# Patient Record
Sex: MALE | Race: WHITE | NOT HISPANIC OR LATINO | Employment: UNEMPLOYED | URBAN - METROPOLITAN AREA
[De-identification: names, ages, dates, MRNs, and addresses within clinical notes are randomized per-mention and may not be internally consistent; named-entity substitution may affect disease eponyms.]

---

## 2023-04-22 ENCOUNTER — HOSPITAL ENCOUNTER (EMERGENCY)
Facility: HOSPITAL | Age: 33
Discharge: PSYCHIATRIC OR SUBSTANCE USE FACILITY | End: 2023-04-23
Attending: EMERGENCY MEDICINE
Payer: COMMERCIAL

## 2023-04-22 DIAGNOSIS — R45.850 HOMICIDAL IDEATION: Primary | ICD-10-CM

## 2023-04-22 DIAGNOSIS — R44.0 AUDITORY HALLUCINATION: ICD-10-CM

## 2023-04-22 PROBLEM — F29 PSYCHOSIS: Chronic | Status: ACTIVE | Noted: 2023-04-22

## 2023-04-22 LAB
ALBUMIN SERPL BCP-MCNC: 4 G/DL (ref 3.5–5.2)
ALP SERPL-CCNC: 102 U/L (ref 55–135)
ALT SERPL W/O P-5'-P-CCNC: 19 U/L (ref 10–44)
AMPHET+METHAMPHET UR QL: NEGATIVE
ANION GAP SERPL CALC-SCNC: 12 MMOL/L (ref 8–16)
APAP SERPL-MCNC: <3 UG/ML (ref 10–20)
AST SERPL-CCNC: 15 U/L (ref 10–40)
BARBITURATES UR QL SCN>200 NG/ML: NEGATIVE
BASOPHILS # BLD AUTO: 0.06 K/UL (ref 0–0.2)
BASOPHILS NFR BLD: 0.6 % (ref 0–1.9)
BENZODIAZ UR QL SCN>200 NG/ML: NEGATIVE
BILIRUB SERPL-MCNC: 0.2 MG/DL (ref 0.1–1)
BILIRUB UR QL STRIP: NEGATIVE
BUN SERPL-MCNC: 12 MG/DL (ref 6–20)
BZE UR QL SCN: NEGATIVE
CALCIUM SERPL-MCNC: 9 MG/DL (ref 8.7–10.5)
CANNABINOIDS UR QL SCN: ABNORMAL
CHLORIDE SERPL-SCNC: 106 MMOL/L (ref 95–110)
CLARITY UR: CLEAR
CO2 SERPL-SCNC: 25 MMOL/L (ref 23–29)
COLOR UR: YELLOW
CREAT SERPL-MCNC: 0.9 MG/DL (ref 0.5–1.4)
CREAT UR-MCNC: 35.5 MG/DL (ref 23–375)
DIFFERENTIAL METHOD: ABNORMAL
EOSINOPHIL # BLD AUTO: 0.1 K/UL (ref 0–0.5)
EOSINOPHIL NFR BLD: 0.9 % (ref 0–8)
ERYTHROCYTE [DISTWIDTH] IN BLOOD BY AUTOMATED COUNT: 12.6 % (ref 11.5–14.5)
EST. GFR  (NO RACE VARIABLE): >60 ML/MIN/1.73 M^2
ETHANOL SERPL-MCNC: <10 MG/DL
GLUCOSE SERPL-MCNC: 105 MG/DL (ref 70–110)
GLUCOSE UR QL STRIP: NEGATIVE
HCT VFR BLD AUTO: 42.4 % (ref 40–54)
HGB BLD-MCNC: 13.8 G/DL (ref 14–18)
HGB UR QL STRIP: NEGATIVE
IMM GRANULOCYTES # BLD AUTO: 0.06 K/UL (ref 0–0.04)
IMM GRANULOCYTES NFR BLD AUTO: 0.6 % (ref 0–0.5)
KETONES UR QL STRIP: NEGATIVE
LEUKOCYTE ESTERASE UR QL STRIP: NEGATIVE
LYMPHOCYTES # BLD AUTO: 2.6 K/UL (ref 1–4.8)
LYMPHOCYTES NFR BLD: 24 % (ref 18–48)
MCH RBC QN AUTO: 29.9 PG (ref 27–31)
MCHC RBC AUTO-ENTMCNC: 32.5 G/DL (ref 32–36)
MCV RBC AUTO: 92 FL (ref 82–98)
METHADONE UR QL SCN>300 NG/ML: NEGATIVE
MONOCYTES # BLD AUTO: 1 K/UL (ref 0.3–1)
MONOCYTES NFR BLD: 8.8 % (ref 4–15)
NEUTROPHILS # BLD AUTO: 7.1 K/UL (ref 1.8–7.7)
NEUTROPHILS NFR BLD: 65.1 % (ref 38–73)
NITRITE UR QL STRIP: NEGATIVE
NRBC BLD-RTO: 0 /100 WBC
OPIATES UR QL SCN: NEGATIVE
PCP UR QL SCN>25 NG/ML: NEGATIVE
PH UR STRIP: 7 [PH] (ref 5–8)
PLATELET # BLD AUTO: 344 K/UL (ref 150–450)
PMV BLD AUTO: 8.4 FL (ref 9.2–12.9)
POTASSIUM SERPL-SCNC: 3.8 MMOL/L (ref 3.5–5.1)
PROT SERPL-MCNC: 6.6 G/DL (ref 6–8.4)
PROT UR QL STRIP: NEGATIVE
RBC # BLD AUTO: 4.62 M/UL (ref 4.6–6.2)
SODIUM SERPL-SCNC: 143 MMOL/L (ref 136–145)
SP GR UR STRIP: 1.01 (ref 1–1.03)
TOXICOLOGY INFORMATION: ABNORMAL
TSH SERPL DL<=0.005 MIU/L-ACNC: 1.17 UIU/ML (ref 0.4–4)
URN SPEC COLLECT METH UR: NORMAL
UROBILINOGEN UR STRIP-ACNC: NEGATIVE EU/DL
WBC # BLD AUTO: 10.81 K/UL (ref 3.9–12.7)

## 2023-04-22 PROCEDURE — 81003 URINALYSIS AUTO W/O SCOPE: CPT | Mod: 59 | Performed by: EMERGENCY MEDICINE

## 2023-04-22 PROCEDURE — 36415 COLL VENOUS BLD VENIPUNCTURE: CPT | Performed by: EMERGENCY MEDICINE

## 2023-04-22 PROCEDURE — 99205 PR OFFICE/OUTPT VISIT, NEW, LEVL V, 60-74 MIN: ICD-10-PCS | Mod: 95,,, | Performed by: PSYCHIATRY & NEUROLOGY

## 2023-04-22 PROCEDURE — 80053 COMPREHEN METABOLIC PANEL: CPT | Performed by: EMERGENCY MEDICINE

## 2023-04-22 PROCEDURE — 80143 DRUG ASSAY ACETAMINOPHEN: CPT | Performed by: EMERGENCY MEDICINE

## 2023-04-22 PROCEDURE — 85025 COMPLETE CBC W/AUTO DIFF WBC: CPT | Performed by: EMERGENCY MEDICINE

## 2023-04-22 PROCEDURE — 84443 ASSAY THYROID STIM HORMONE: CPT | Performed by: EMERGENCY MEDICINE

## 2023-04-22 PROCEDURE — 99205 OFFICE O/P NEW HI 60 MIN: CPT | Mod: 95,,, | Performed by: PSYCHIATRY & NEUROLOGY

## 2023-04-22 PROCEDURE — 80307 DRUG TEST PRSMV CHEM ANLYZR: CPT | Performed by: EMERGENCY MEDICINE

## 2023-04-22 PROCEDURE — 82077 ASSAY SPEC XCP UR&BREATH IA: CPT | Performed by: EMERGENCY MEDICINE

## 2023-04-22 PROCEDURE — 99285 EMERGENCY DEPT VISIT HI MDM: CPT

## 2023-04-23 VITALS
RESPIRATION RATE: 18 BRPM | HEART RATE: 95 BPM | OXYGEN SATURATION: 100 % | DIASTOLIC BLOOD PRESSURE: 91 MMHG | SYSTOLIC BLOOD PRESSURE: 146 MMHG | HEIGHT: 68 IN | TEMPERATURE: 98 F | BODY MASS INDEX: 24.25 KG/M2 | WEIGHT: 160 LBS

## 2023-04-23 NOTE — ED PROVIDER NOTES
"Encounter Date: 4/22/2023    SCRIBE #1 NOTE: I, Vinay Diaz, am scribing for, and in the presence of,  Stanton Oropeza MD.     History     Chief Complaint   Patient presents with    Homicidal     Pt brought to ed via PD for [facility] gabriel, PD advised pt was on video stating that the devil followed him home from alf, also on video making threats to shot someone.        Time seen by provider: 9:59 PM on 04/22/2023    Brayden Francois is a 33 y.o. male who presents to the ED via police transport with homicidal ideation and hallucinations. The patient reports being "plundered" by the devil since his last year in alf. The patient has been out of alf for a month after having served a 7 year sentence after he refused to snitch. He states that he has been feeling negative energy and spirits. The patient notes that he still feels the source of the negative energy coming from the alf he served time in despite being released. He hears auditory hallucinations telling him to kill his mother. The patient states that he has been fighting with the hallucinations and denies any urge to kill his mother. He has tried praying to remove the negative energy. The patient also endorses a sensation of weakness as if something was stealing his strength and making him too weak to fight back the negative spirits. When asked, the patient denies having used a ouija board recently or having done any drugs recently. He mentions that he has done more drugs prior to serving alf time and never had any issues. The patient reports that he and his mother called a psychiatrist yesterday and was only able to get an appointment on 5/15/23 as they were booked. The patient also suspects having cancer as he has a knot in his left chest since 2019 which has since decreased in size. The patient denies SI, HI, or any other symptoms at this time. No pertinent PMHx. No pertinent PSHx.    The history is provided by the patient.   Review of patient's allergies " indicates:  No Known Allergies  No past medical history on file.  Past Surgical History:   Procedure Laterality Date    APPENDECTOMY      FRACTURE SURGERY      TONSILLECTOMY       No family history on file.  Social History     Tobacco Use    Smoking status: Every Day     Packs/day: 1.00     Years: 12.00     Pack years: 12.00     Types: Cigarettes    Smokeless tobacco: Former   Substance Use Topics    Alcohol use: No    Drug use: No     Review of Systems   Constitutional:  Negative for fever.   HENT:  Negative for sore throat.    Respiratory:  Negative for shortness of breath.    Cardiovascular:  Negative for chest pain.   Gastrointestinal:  Negative for nausea.   Genitourinary:  Negative for dysuria.   Musculoskeletal:  Negative for back pain.   Skin:  Negative for rash.   Neurological:  Positive for weakness.   Hematological:  Does not bruise/bleed easily.   Psychiatric/Behavioral:  Positive for hallucinations. Negative for suicidal ideas.      Physical Exam     Initial Vitals [04/22/23 2133]   BP Pulse Resp Temp SpO2   132/81 108 17 97.4 °F (36.3 °C) 98 %      MAP       --         Physical Exam    Nursing note and vitals reviewed.  Constitutional: Vital signs are normal. He appears well-developed and well-nourished.  Non-toxic appearance. No distress.   HENT:   Head: Normocephalic and atraumatic.   Eyes: EOM are normal. Pupils are equal, round, and reactive to light.   Neck: Neck supple. No JVD present.   Normal range of motion.  Cardiovascular:  Normal rate, regular rhythm, normal heart sounds and intact distal pulses.     Exam reveals no gallop and no friction rub.       No murmur heard.  Pulmonary/Chest: Breath sounds normal. He has no wheezes. He has no rhonchi. He has no rales.   Abdominal: Abdomen is soft. Bowel sounds are normal. There is no abdominal tenderness. There is no rebound and no guarding.   Musculoskeletal:         General: Normal range of motion.      Cervical back: Normal range of motion and  neck supple. No rigidity.     Neurological: He is alert and oriented to person, place, and time. He has normal strength and normal reflexes. No cranial nerve deficit or sensory deficit. He exhibits normal muscle tone. Coordination normal. GCS eye subscore is 4. GCS verbal subscore is 5. GCS motor subscore is 6.   Skin: Skin is warm and dry.   Psychiatric: He has a normal mood and affect. His speech is normal and behavior is normal. He is actively hallucinating (auditory). He expresses no homicidal and no suicidal ideation. He expresses no suicidal plans and no homicidal plans.   Patient has auditory hallucinations telling him to kill his mother.       ED Course   Procedures  Labs Reviewed   CBC W/ AUTO DIFFERENTIAL - Abnormal; Notable for the following components:       Result Value    Hemoglobin 13.8 (*)     MPV 8.4 (*)     Immature Granulocytes 0.6 (*)     Immature Grans (Abs) 0.06 (*)     All other components within normal limits   DRUG SCREEN PANEL, URINE EMERGENCY - Abnormal; Notable for the following components:    THC Presumptive Positive (*)     All other components within normal limits    Narrative:     Specimen Source->Urine   ACETAMINOPHEN LEVEL - Abnormal; Notable for the following components:    Acetaminophen (Tylenol), Serum <3.0 (*)     All other components within normal limits   COMPREHENSIVE METABOLIC PANEL   TSH   URINALYSIS, REFLEX TO URINE CULTURE    Narrative:     Specimen Source->Urine   ALCOHOL,MEDICAL (ETHANOL)          Imaging Results    None          Medications - No data to display  Medical Decision Making:   History:   Old Medical Records: I decided to obtain old medical records.  Initial Assessment:   This is an emergent evaluation for psychiatric evaluation.  The pt presents for evaluation of acute psychosis with auditory hallucinations telling him to kill his mother.    I feel the pt is danger to others and pt was placed under PEC with direct observation.  Medical workup was initiated  to evaluate for organic etiologies.  Pt's etoh level was negative  I do not believe the pt has metabolic encephalopathy, CVA, severe electrolyte derrangement, drug induced temporary psychosis.    Psychiatric consult obtained and agrees with pec.  Patient is medically cleared for transfer to psychiatric facility..      Clinical Tests:   Lab Tests: Ordered and Reviewed        Scribe Attestation:   Scribe #1: I performed the above scribed service and the documentation accurately describes the services I performed. I attest to the accuracy of the note.      ED Course as of 04/23/23 0117   Sun Apr 23, 2023   0116 Marijuana (THC) Metabolite(!): Presumptive Positive [AS]   0116 TSH: 1.169 [AS]   0116 Acetaminophen (Tylenol), Serum(!): <3.0 [AS]   0116 WBC: 10.81 [AS]   0116 Hemoglobin(!): 13.8 [AS]   0116 BUN: 12 [AS]   0116 Creatinine: 0.9 [AS]      ED Course User Index  [AS] Stanton Oropeza MD       Medically cleared for psychiatry placement: 4/23/2023 12:33 AM       I, Sandip Vazquez, personally performed the services described in this documentation. All medical record entries made by the scribe were at my direction and in my presence.  I have reviewed the chart and agree that the record reflects my personal performance and is accurate and complete. Stanton Oropeza MD.  Clinical Impression:   Final diagnoses:  [R45.850] Homicidal ideation (Primary)  [R44.0] Auditory hallucination        ED Disposition Condition    Transfer to Psych Facility Stable          ED Prescriptions    None       Follow-up Information    None          Stanton Oropeza MD  04/23/23 0117

## 2023-04-23 NOTE — ED NOTES
Physician's Emergency Certificate for Brayden Francois faxed to and called into [facility] office.  PEC scanned into chart for [facility].

## 2023-04-23 NOTE — ED NOTES
Call placed to [facility] for consult to Telemedicine - Psychiatry for Brayden Francois.

## 2023-04-23 NOTE — DISCHARGE INSTRUCTIONS
Thank you for allowing me to participate as part of your health care team, and thank you for choosing [facility].    CHRIS MILLS MD  Board Certified in Psychiatry & Addiction Medicine      IN CASE OF SUICIDAL THINKING, call the National Suicide Hotline Number: 988    988 Suicide & Crisis Lifeline: 988 , 7-093-099-TALK (8255)  https://iMemories.Atlas Guides           AFTER VISIT INSTRUCTIONS:     [x] Take all medication, from all providers, as prescribed.  [x] If questions or concerns arise, or if experiencing side effects, adverse reactions or worsening symptoms, contact your provider through the [facility] portal at [email or web address], or call [phone] to reach the [facility] main line.  [x] In cases of emergencies, call 381 or 129, or present directly to the emergency department for immediate assistance.    Information on mental health medications:    National [place] of Mental Health:   https://www.nimh.nih.gov/health/topics/mental-health-medications     Web MD:   https://www.ChicPlace.[employer]       RESOURCES:     IN CASE OF SUICIDAL THINKING, call the [facility] Suicide Hotline Number: 988    988 Suicide & Crisis Lifeline: 988 , 5-176-188-TALK (8255)  Provides 24/7, free and confidential support for people in distress, prevention and crisis resources for you or your loved ones, and best practices for professionals.    Call, text or chat.  https://iMemories.org     National Action Calvin for Suicide Prevention: the National Action Calvin for Suicide Prevention (Action Calvin) is the nations public-private partnership for suicide prevention, working with more than 250 national partners.   https://theactionalliance.org     National Strategy for Suicide Prevention & Risk Mitigation:  https://theactionalliance.org/our-strategy/national-strategy-suicide-prevention     [x] Fact Sheet:   https://www.hhs.gov/sites/default/files/national-strategy-for-suicide-prevention-factsheet.pdf     [x] Report:    https://www.ncbi.nlm.nih.gov/books/KEJ207901/pdf/Bookshelf_NBK109917.pdf     Suicide Prevention Resource Center: The Suicide Prevention Resource Center (SPR) is the only federally supported resource center devoted to advancing the implementation of the National Strategy for Suicide Prevention. [facility] is funded by the U.S. Department of Health and Human Services' Substance Abuse and Mental Health Services Administration (SAMA).  https://www.[facility].org     [x] Safety Plan:   https://ByHours.com/wp-content/uploads/2021/08/Yosef-Safety-Plan-8-6-21.pdf     [x] Suicide Risk Curve:  https://ByHours.com/wp-content/uploads/2021/08/Veqmmod-sqtm-zivsl-8-6-21.pdf     [place] Mental Health Advocacy Service: the state agency tasked with protecting the legal rights of people with behavioral health diagnoses.  https://mhas.[place].[facility]     Alcoholics Anonymous (AA): find a meeting near you.  https://www.aa.org     SMI Adviser: resources for individuals and families with serious mental illness.  https://smiadviser.org     National Freedom for the Mentally Ill (HERBER): the nation's largest grassroots organization dedicated to building better lives for individuals with mental illness.  https://www.herber.org/Home     U.S. Department of Health and Human Services (HHS): the mission of HHS is to enhance the health and well-being of all Americans, by providing for effective health and human services and by fostering sound, sustained advances in the sciences underlying medicine, public health, and .   https://www.hhs.gov     Substance Abuse and Mental Health Services Administration (SAMHSA): SAMHSA is the agency within [facility] that leads public health efforts to advance the behavioral health of the nation. SAMHSA's mission is to reduce the impact of substance abuse and mental illness on [place] communities.   https://www.samhsa.gov     National Institutes of Health (NIH): a part of [facility], [facility] is  the largest biomedical research agency in the world.   https://www.nih.gov     National [place] on Drug Abuse (CAITLYN): sponsored by the NIH, the mission of CAITLYN is to advance science on drug use and addiction and to apply that knowledge to improve individual and public health.  https://caitlyn.nih.gov     National [place] on Alcohol Abuse and Alcoholism (NIAAA): sponsored by the NIH, the mission of NIAA is to generate and disseminate fundamental knowledge about the effects of alcohol on health and well-being, and apply that knowledge to improve diagnosis, prevention, and treatment of alcohol-related problems, including alcohol use disorder, across the lifespan.   https://www.niaaa.nih.gov     National Harm Reduction Coalition: resources for harm reduction, including techniques, strategies, policy, and advocacy.  https://harmreduction.org     The SHARE Approach - A Model for Shared Decision Making:  [x] Fact Sheet  https://www.Bannerq.gov/sites/default/files/publications/files/share-approach_factsheet.pdf     AMA Principles of Medical Ethics - Informed Consent & Shared Decision Making:  [x] Chapter  https://www.ama-assn.org/system/files/2019-06/code-of-medical-npwdki-rdbzlbk-7.pdf     Safety Netting for Primary Care:  [x] Article  https://www.ncbi.nlm.nih.gov/pmc/articles/WIG1986097/pdf/yleoyol-9060--e70.pdf       MEDICATION MANAGEMENT:     [x] In addition to the potential beneficial effects, the use of any medication or drug (prescribed, over the counter or otherwise) carries with it the risk of potential adverse effects.  Each has a set of typical adverse effects - some common, some rare - but idiosyncratic and unanticipated reactions unique to you are always possible.      [x] It is important to remember that untreated illness can also pose a risk, which must be taken into account when weighing the pros and cons of a medication trial.    [x] Medications and drugs can sometimes interact with each other in the  body, leading to adverse effects - it is important that all your providers know all the medications and drugs you take - prescribed, over the counter, or otherwise.  Keep all your practitioners up to date with any changes.  It's always a good idea to keep an up-to-date list in an easily accessible location.    [x] There is an inherent unpredictability to all treatment, including the use of medication.  Unexpected outcomes can occur - keep me up to date with any difficulties you encounter.    [x] It is important to take medication as directed, and to comply fully with the instructions.  Check with the appropriate provider first before adjusting or stopping your medication on your own.    If you require further information pertaining to the issues outlined above, please reach out to your providers through the [facility] portal at [email or web address], or call [phone] to discuss.  See resource list for additional material.     Additional information can be provided pertaining to your diagnosis, intended outcomes, target symptoms for treatment, and possible benefits and risks of medication - you can also access this information through the provided resources.  Possible alternatives to the current treatment plan (including no treatment) can also be reviewed.      GENERAL HEALTH & WELLNESS:     [x] Establish and follow regularly with a primary care physician for routine health maintenance and management of any medical comorbidities.  [x] Follow a healthy diet, exercise routinely, and monitor weight and metabolic parameters.  [x] Allow adequate time for sleep and practice good sleep hygiene.  [x] Do not operate a motor vehicle or heavy machinery if the effects of medications or the symptoms underlying your condition impair the ability for you to do so safely.    Dietary Guidelines for Americans, 7662-6855:  U.S. Department of Agriculture  (USDA)  https://www.dietaryguidelines.gov/sites/default/files/2020-12/Dietary_Guidelines_for_Americans_2020-2025.pdf#page=31     The Nutrition Source:  [facility]  https://www.[facility].[facility]/nutritionsource       SLEEP HYGIENE:     Follow these tips to establish healthy sleep habits:  [x] Keep a consistent sleep schedule. Get up at the same time every day, even on weekends or during vacations.  [x] Set a bedtime that is early enough for you to get at least 7-8 hours of sleep.  [x] Don't go to bed unless you are sleepy.  [x] If you don't fall asleep after 20 minutes, get out of bed. Go do a quiet activity without a lot of light exposure. It is especially important to not get on electronics.  [x] Establish a relaxing bedtime routine.  [x] Use your bed only for sleep and sex.  [x] Make your bedroom quiet and relaxing. Keep the room at a comfortable, cool temperature.  [x] Limit exposure to bright light in the evenings.  [x] Turn off electronic devices at least 30 minutes before bedtime.  [x] Don't eat a large meal before bedtime. If you are hungry at night, eat a light, healthy snack.  [x] Exercise regularly and maintain a healthy diet.  [x] Avoid consuming caffeine in the afternoon or evening.  [x] Avoid consuming alcohol before bedtime.  [x] Reduce your fluid intake before bedtime.    QUICK TIPS FOR BETTER SLEEP  Reduce smartphone usage Create and maintain a nightly ritual Avoid caffeine 4-6 hours before sleeping Don't eat or drink too much at bedtime Sleep at the same time every night        American Academy of Sleep Medicine - Healthy Sleep Habits:  https://sleepeducation.org/healthy-sleep/healthy-sleep-habits     American Academy of Sleep Medicine - Bedtime Calculator:  https://sleepeducation.org/healthy-sleep/bedtime-calculator     American Academy of Sleep Medicine - Cognitive Behavioral Therapy for Insomnia (CBT-I):  https://sleepeducation.org/patients/cognitive-behavioral-therapy      American Academy of Sleep Medicine - Insomnia:  https://sleepeducation.org/sleep-disorders/insomnia       ALCOHOL & DRUG USE COUNSELING:     - abstain from alcohol and illicit drug use  - routinely attend 12 step (or equivalent) mutual self-help meetings  - work with a sponsor  - establish sobriety and maintain a recovery lifestyle      Preventing Excessive Alcohol Use (CDC):  https://www.cdc.gov/alcohol/fact-sheets/moderate-drinking.htm#:~:text=To%20reduce%20the%20risk%20of,days%20when%20alcohol%20is%20consumed.     [x] Alcohol consumption is associated with a variety of short- and long-term health risks, including motor vehicle crashes, violence, sexual risk behaviors, high blood pressure, and various cancers (e.g., breast cancer).  [x] The risk of these harms increases with the amount of alcohol you drink. For some conditions, like some cancers, the risk increases even at very low levels of alcohol consumption (less than 1 drink).  [x] To reduce the risk of alcohol-related harms, the 2061-3993 Dietary Guidelines for Americans recommends that adults of legal drinking age can choose not to drink, or to drink in moderation by limiting intake to 2 drinks or less in a day for men or 1 drink or less in a day for women, on days when alcohol is consumed.  [x] The Guidelines also do not recommend that individuals who do not drink alcohol start drinking for any reason and that if adults of legal drinking age choose to drink alcoholic beverages, drinking less is better for health than drinking more.  [x] The Guidelines note that some people should not drink alcohol at all, such as:  - If they are pregnant or might be pregnant.  - If they are younger than age 21.  - If they have certain medical conditions or are taking certain medications that can interact with alcohol.  - If they are recovering from an alcohol use disorder or if they are unable to control the amount they drink.  [x] The Guidelines also note that not  "drinking alcohol is the safest option for women who are lactating. Generally, moderate consumption of alcoholic beverages by a woman who is lactating (up to 1 standard drink in a day) is not known to be harmful to the infant, especially if the woman waits at least 2 hours after a single drink before nursing or expressing breast milk. Women considering consuming alcohol during lactation should talk to their healthcare provider.  [x] The Guidelines note, Emerging evidence suggests that even drinking within the recommended limits may increase the overall risk of death from various causes, such as from several types of cancer and some forms of cardiovascular disease. Alcohol has been found to increase risk for cancer, and for some types of cancer, the risk increases even at low levels of alcohol consumption (less than 1 drink in a day).  [x] Although past studies have indicated that moderate alcohol consumption has protective health benefits (e.g., reducing risk of heart disease), recent studies show this may not be true.  [x] Its important to focus on the amount people drink on the days that they drink. Even if women consume an average of 1 drink per day or men consume an average of 2 drinks per day, binge drinking increases the risk of experiencing alcohol-related harm in the short-term and in the future.    Drinking Levels Defined (NIAAA):  https://www.niaaa.nih.gov/alcohol-health/overview-alcohol-consumption/moderate-binge-drinking     Drinking in Moderation:  According to the "Dietary Guidelines for Americans 3614-5981, U.S. Department of Health and Human Services and U.S. Department of Agriculture, adults of legal drinking age can choose not to drink or to drink in moderation by limiting intake to 2 drinks or less in a day for men and 1 drink or less in a day for women, when alcohol is consumed. Drinking less is better for health than drinking more.    Binge Drinking:  NIAAA defines binge drinking as a pattern " of drinking alcohol that brings blood alcohol concentration (LUDY) to 0.08 percent - or 0.08 grams of alcohol per deciliter - or higher.  For a typical adult, this pattern corresponds to consuming 5 or more drinks (male), or 4 or more drinks (female), in about 2 hours.    The Substance Abuse and Mental Health Services Administration (SAMHSA), which conducts the annual National Survey on Drug Use and Health (NSDUH), defines binge drinking as 5 or more alcoholic drinks for males or 4 or more alcoholic drinks for females on the same occasion (i.e., at the same time or within a couple of hours of each other) on at least 1 day in the past month.    Heavy Alcohol Use:  NIAAA defines heavy drinking as follows:  - For men, consuming more than 4 drinks on any day or more than 14 drinks per week.  - For women, consuming more than 3 drinks on any day or more than 7 drinks per week.     [facility] defines heavy alcohol use as binge drinking on 5 or more days in the past month.    Patterns of Drinking Associated with Alcohol Use Disorder:  Binge drinking and heavy alcohol use can increase an individual's risk of alcohol use disorder.    Certain people should avoid alcohol completely, including those who:  - Plan to drive or operate machinery, or participate in activities that require skill, coordination, and alertness.  - Take certain over-the-counter or prescription medications.  - Have certain medical conditions.  - Are recovering from alcohol use disorder or are unable to control the amount that they drink.  - Are younger than age 21.  - Are pregnant or may become pregnant.    U.S. Standard Drink  12 oz beer   (5% ABV) 8 oz malt liquor   (7% ABV) 5 oz wine   (12% ABV) 1.5 oz 80-proof distilled spirit  (40% ABV)        Heroin use harm reduction:  1. Carry naloxone. When using heroin, make sure you have at least one dose of naloxone - the overdose reversal drug - and have it in plain view. Understand how to give it.  2. Try a small  dose first. It is best to first try a small amount of the heroin to check the effect.  3. Dont use heroin alone. Always use heroin with someone else and take turns while using.    It is possible to overdose with heroin whether you are snorting, injecting or using it in another form.    Signs of an overdose or emergency:   - The person is awake but unable to talk.  - Their body is limp.  - Their breathing is shallow or slow or stopped.  - Their skin is pale, ashen or clammy/sweaty.  - They are unconscious.    In case of emergency, give naloxone. If you suspect the heroin may contain fentanyl, administer more than one dose. Seek medical help even if naloxone has been given. Call 911 for help.      SHARED DECISION MAKING & INFORMED CONSENT:     Shared medical decision making and informed consent are the hallmark and bedrock of excellent clinical care.  During the encounter, shared medical decision making was employed and informed consent was obtained, to the degree possible, whenever feasible, appropriate and relevant. Those interventions are supplemented here with written materials, detailing the topics in more depth.       PSYCHOEDUCATION:     Psychoeducation pertaining to the following -     Diagnosis Etiology Disease Processes Natural Progression   Treatment Options Time Course Safety Netting Informed Consent   Intended Benefits of Medication Expectable Adverse Effects Target Symptoms for Treatment Alternatives to Current Treatment   Shared   Decision Making Risk Mitigation Strategies Harm Reduction Techniques Associated Bio-Med Complications     - can be further discussed and reviewed (you can also access additional information through the provided resources in this document).      Effective communication is essential in order to engage in shared medical decision making.  If you had difficulty understanding anything during your encounter or in this supplementary document, please contact your providers through the  [facility] portal at [email or web address] or call [phone].     [place] Dictionary  https://dictionary.leslie.org/us       It can be easy to miss, forget, or misremember important important information that was discussed during the session - especially when you're stressed, upset, or don't feel well.  If you or a representative have any additional questions, concerns, or topics to discuss - please contact your providers through the [facility] portal at [email or web address] or call [phone].    Memory Loss  https://www.[employer].Endorse.me/brain/memory-loss    Causes of Memory Loss  https://www.DotProduct/what-causes-memory-loss-6497687    Memory loss: When to seek help  https://www.[facility].org/diseases-conditions/alzheimers-disease/in-depth/memory-loss/art-81693303    Memory, Forgetfulness, and Aging: What's Normal and What's Not?  https://www.gnia.nih.gov/health/memory-forgetfulness-and-aging-whats-normal-and-whats-not    Depression and Memory Loss  https://www.[employer].Endorse.me/health/depression/depression-and-memory-loss    The Relationship Between Anxiety and Memory Loss  https://www.Pl[employer]c.[facility]/academics/blog-posts/the-relationship-between-anxiety-and-memory-loss     PRESCRIPTION DRUG MANAGEMENT:     Prescription Drug Management entails the following:  [x] The review, recommendation, or consideration without recommendation of medications during the encounter.  [x] Discussion (to the extent possible) with the patient and/or other interested parties of the diagnosis, target symptoms, intended outcomes, and possible benefits and risks of medication, as well as alternatives (including no treatment), if not otherwise known or stated prior.  [x] Discussion (to the extent possible) with the patient and/or other interested parties of possible expectable adverse effects of any proposed individual psychotropic agents, as well as the inherent unpredictability of treatment, if not otherwise known or stated  prior.  [x] Informed consent is sought from the patient (and/or guardian/designated decision maker, if applicable) after a thorough discussion (to the extent possible) of the aforementioned points outlined above.  [x] The provision of counseling (to the extent possible) to the patient and/or other interested parties on the importance of full compliance with any prescribed medication, if not otherwise known or stated prior.    Information on psychotropic medication can be found at:   National [place] of Mental Health: Information on Mental Health Medications      RISK MITIGATION, HARM REDUCTION & SAFETY NETTING:     Risk Mitigation Strategies, Harm Reduction Techniques, and Safety Netting are important interventions that can reduce acute and chronic risk.  As such, opportunities were sought to incorporate psychoeducation and practical advice pertaining to these topics into the encounter, to the degree possible, whenever feasible, appropriate and relevant.  Those interventions are supplemented here with written materials, detailing the topics in more depth.       RISK MITIGATION STRATEGIES:     Risk mitigation strategies are used to reduce the likelihood of future episodes of suicide, homicide, violence, and/or other problematic behaviors (e.g. self-injurious, risky, addictive, compulsive, impulsive). The following are examples of risk mitigation strategies which you can employ in order to reduce your overall burden of risk.     [x] Treatment of underlying psychopathology driving acute and chronic risk to the extent possible.  [x] Use of self administered rating scales and journaling to assist in risk tracking.  [x] Exploration of protective factors to potentially counterbalance risk.  [x] Identification and avoidance of triggers and situations that increase risk, including excessive alcohol and drug use.  [x] Timely follow up and ongoing treatment of mental health issues moving forward.  [x] Full compliance with  medication regimen.  [x] A good working knowledge of your medication regimen, including specific instructions on the administration of the medications.  [x] Consultation with an appropriate medical provider prior to altering or deviating from these instructions on your own.  [x] Active involvement and participation of family and natural support wherever feasible and possible.  [x] Development and review of coping strategies that can be immediately deployed in times of acute crisis.  [x] Implementation of home safety practices and the removal/reduction of access to lethal means (including, but not limited to, firearms, certain types and quantities of medication, poisons, or other methods you may have contemplated or identified).  [x] Collaborative development of a written safety plan with your treatment team and loved ones that can be immediately referred to in times of acute crisis.  [x] Utilization of a safety contract to engage your treatment team and further assess/manage risk.  [x] A good working knowledge of how to access emergency treatment in times of acute crisis.  [x] Utilization of suicide hotlines number (988) and resources in times of crisis.    If you require further information pertaining to the issues outlined above, please reach out to your providers through the [facility] portal at [email or web address], or call [phone] to discuss.  See resource list for additional material.      SAFETY NETTING:     In healthcare, safety netting refers to the provision of information to help patients or carers identify the need to consult a health care professional if a health concern arises or changes.  The relevance of this advice is most obvious with chronic mental illnesses, as their dynamic nature, with symptoms and signs emerging at different times and in different combinations, makes safety netting particularly important.  Specific safety net advice for you includes the following:    [x] The existence of  uncertainty. Mental health diagnoses and conditions contain at least some degree of uncertainty - knowing this, you should feel empowered to reconsult if necessary.  [x] What exactly to look out for. Given the recognised risk of possible deterioration or the development of complications, you should become familiar with the specific clinical features (including red flags) to look out for.    [x] How exactly to seek further help. You should know how and where to seek further help if needed.  Make a plan in advance and keep it handy.  It's also a good idea to share the plan with your treatment providers and loved ones.  [x] What to expect about time course. Mental health diagnoses and conditions often have an expected time course, which is important information for you to know.  However, if your difficulties do not conform to this time line and concerns arise, do not delay seeking further medical advice.    If you require further information pertaining to the issues outlined above, please reach out to your providers through the [facility] portal at [email or web address], or call [phone] to discuss.  See resource list for additional material.      HARM REDUCTION:     Harm Reduction techniques are used in an effort to reduce negative consequences associated with risky and maladaptive behaviors, until cessation of the problematic behaviors can be established.  Harm reduction is best thought of as a journey and not a destination; it is not an endorsement of problematic behavior, but an acknowledgement and recognition of the step-by-step nature of recovery.      Although commonly employed in working with people who suffer with drug addiction, harm reduction can be more broadly applied to any problematic behavior.    Harm Reduction and Substance Abuse:  [x] Incorporates a spectrum of strategies that includes safer use, managed use, abstinence, meeting people who use drugs where theyre at, and addressing conditions of  use along with the use itself.  [x] Accepts, for better or worse, that licit and illicit drug use is part of our world and chooses to work to minimize its harmful effects rather than simply ignore or condemn them.  [x] Understands drug use as a complex, multi-faceted phenomenon that encompasses a continuum of behaviors from severe use to total abstinence, and acknowledges that some ways of using drugs are clearly safer than others.  [x] Calls for the non-judgmental, non-coercive provision of services and resources to people who use drugs and the communities in which they live in order to assist them in reducing attendant harm.  [x] Affirms people who use drugs themselves as the primary agents of reducing the harms of their drug use and seeks to empower them to share information and support each other in strategies which meet their actual conditions of use.  [x] Does not attempt to minimize or ignore the real and tragic harm and danger that can be associated with illicit drug use.  [x] Meets people where they are, but seeks to not leave them there.  [x] Examples of specific interventions include, but are not limited to, narcan (naloxone), medication assisted treatment, syringe access, overdose prevention, and safer drug use techniques.    Key Harm Reduction Strategies: Opioid Use Disorder  [x] Safe Injection Sites & Equipment  [x] Managed Use  [x] Syringe Exchange Programs  [x] Fentanyl Test Strips  [x] Pharmacotherapy/Medication Assisted Treatment  [x] Narcan  [x] Good Holiness Laws  [x] Treatment Instead of assisted  [x] Diversion Programs  [x] Overdose Education  [x] Abstinence    Whether or not you struggle with substance abuse, any and all opportunities to employ harm reduction techniques to address difficult to change problematic behaviors should be sought and implemented - whenever and wherever feasible, relevant and applicable. Additionally, harm reduction techniques can be applied broadly, and are relevant  "for a multitude of situations - even those that do not involve problematic or maladaptive behaviors.     EXAMPLES OF HARM REDUCTION IN OTHER AREAS  SUN SCREEN SEAT BELTS SPEED LIMITS BIRTH CONTROL        If you require further information pertaining to the issues outlined above, please reach out to your providers through the [facility] portal at [email or web address], or call [phone] to discuss.  See resource list for additional material.      FIREARM SAFETY:     THE SIX BASIC GUN SAFETY RULES  There are six basic gun safety rules for gun owners to understand and practice at all times:  Treat all guns as if they are loaded. Always assume that a gun is loaded even if you think it is unloaded. Every time a gun is handled for any reason, check to see that it is unloaded. If you are unable to check a gun to see if it is unloaded, leave it alone and seek help from someone more knowledgeable about guns.  Keep the gun pointed in the safest possible direction. Always be aware of where a gun is pointing. A "safe direction" is one where an accidental discharge of the gun will not cause injury or damage. Only point a gun at an object you intend to shoot. Never point a gun toward yourself or another person.  Keep your finger off the trigger until you are ready to shoot. Always keep your finger off the trigger and outside the trigger guard until you are ready to shoot. Even though it may be comfortable to rest your finger on the trigger, it also is unsafe. If you are moving around with your finger on the trigger and stumble or fall, you could inadvertently pull the trigger. Sudden loud noises or movements can result in an accidental discharge because there is a natural tendency to tighten the muscles when startled. The trigger is for firing and the handle is for handling.  Know your target, its surroundings and beyond. Check that the areas in front of and behind your target are safe before shooting. Be aware that if the " bullet misses or completely passes through the target, it could strike a person or object. Identify the target and make sure it is what you intend to shoot. If you are in doubt, DON'T SHOOT! Never fire at a target that is only a movement, color, sound or unidentifiable shape. Be aware of all the people around you before you shoot.  Know how to properly operate your gun. It is important to become thoroughly familiar with your gun. You should know its mechanical characteristics including how to properly load, unload and clear a malfunction from your gun. Obviously, not all guns are mechanically the same. Never assume that what applies to one make or model is exactly applicable to another. You should direct questions regarding the operation of your gun to your firearms dealer, or contact the  directly.  Store your gun safely and securely to prevent unauthorized use. Guns and ammunition should be stored separately. When the gun is not in your hands, you must still think of safety. Use an approved firearms safety device on the gun, such as a trigger lock or cable lock, so it cannot be fired. Store it unloaded in a locked container, such as an approved lock box or a gun safe. Store your gun in a different location than the ammunition. For maximum safety you should use both a locking device and a storage container.    ADDITIONAL SAFETY POINTS  The six basic safety rules are the foundational rules for gun safety. However, there are additional safety points that must not be overlooked.  [x] Never handle a gun when you are in an emotional state such as anger or depression. Your judgment may be impaired. If you have acute or chronic suicidal ideation, a suicide plan, or suicidal intent, have firearms removed and your access restricted by a trusted loved one or other responsible individual or agency.  [x] Never shoot a gun in celebration (the Fourth of July or New Year's Maureen, for example). Not only is this unsafe,  "but it is generally illegal. A bullet fired into the air will return to the ground with enough speed to cause injury or death.  [x] Do not shoot at water, flat or hard surfaces. The bullet can ricochet and hit someone or something other than the target.  [x] Hand your gun to someone only after you verify that it is unloaded and the cylinder or action is open. Take a gun from someone only after you verify that it is unloaded and the cylinder or action is open.  [x] Guns, alcohol and drugs don't mix. Alcohol and drugs can negatively affect judgment as well as physical coordination. Alcohol and any other substance likely to impair normal mental or physical functions should not be used before or while handling guns. Avoid handling and using your gun when you are taking medications that cause drowsiness or include a warning to not operate machinery while taking this drug.   [x] The loud noise from a fired gun can cause hearing damage, and the debris and hot gas that is often emitted can result in eye injury. Always wear ear and eye protection when shooting a gun.      GUNS AND CHILDREN - FIREARM OWNER RESPONSIBILITIES    You Cannot Be Too Careful with Children and Guns  [x] There is no such thing as being too careful with children and guns. Never assume that simply because a toddler may lack finger strength, they can't pull the trigger. A child's thumb has twice the strength of the other fingers. When a toddler's thumb "pushes" against a trigger, invariably the barrel of the gun is pointing directly at the child's face. NEVER leave a firearm lying around the house.  [x] Child safety precautions still apply even if you have no children or if your children have grown to adulthood and left home. A nephew, niece, neighbor's child or a grandchild may come to visit. Practice gun safety at all times.  [x] To prevent injury or death caused by improper storage of guns in a home where children are likely to be present, you should " "store all guns unloaded, lock them with a firearms safety device and store them in a locked container. Ammunition should be stored in a location separate from the gun.    Talking to Children About Guns  [x] Children are naturally curious about things they don't know about or think are "forbidden." When a child asks questions or begins to act out "gun play," you may want to address his or her curiosity by answering the questions as honestly and openly as possible. This will remove the mystery and reduce the natural curiosity. Also, it is important to remember to talk to children in a manner they can relate to and understand. This is very important, especially when teaching children about the difference between "real" and "make-believe." Let children know that, even though they may look the same, real guns are very different than toy guns. A real gun will hurt or kill someone who is shot.    Instill a Mind Set of Safety and Responsibility  [x] The American Academy of Pediatrics reports that adolescence is a highly vulnerable stage in life for teenagers struggling to develop traits of identity, independence and autonomy. Children, of course, are both naturally curious and innocently unaware of many dangers around them. Thus, adolescents as well as children may not be sufficiently safeguarded by cautionary words, however frequent. Contrary actions can completely undermine good advice. A "Do as I say and not as I do" approach to gun safety is both irresponsible and dangerous.  [x] Remember that actions speak louder than words. Children learn most by observing the adults around them. By practicing safe conduct you will also be teaching safe conduct.    Safety and Storage Devices  [x] If you decide to keep a firearm in your home you must consider the issue of how to store the firearm in a safe and secure manner. There are a variety of safety and storage devices currently available to the public in a wide range of prices. " Some devices are locking mechanisms designed to keep the firearm from being loaded or fired, but don't prevent the firearm from being handled or stolen. There are also locking storage containers that hold the firearm out of sight. For maximum safety you should use both a firearm safety device and a locking storage container to store your unloaded firearm.   Two of the most common locking mechanisms are trigger locks and cable locks. Trigger locks are typically two-piece devices that fit around the trigger and trigger guard to prevent access to the trigger. One side has a post that fits into a hole in the other side. They are locked by a key or combination locking mechanism. Cable locks typically work by looping a strong steel cable through the action of the firearm to block the firearm's operation and prevent accidental firing. However, neither trigger locks nor cable locks are designed to prevent access to the firearm.   [x] Smaller lock boxes and larger gun safes are two of the most common types of locking storage containers. One advantage of lock boxes and gun safes is that they are designed to completely prevent unintended handling and removal of a firearm. Lock boxes are generally constructed of sturdy, high-grade metal opened by either a key or combination lock. Gun safes are quite heavy, usually weighing at least 50 pounds. While gun safes are typically the most expensive firearm storage devices, they are generally more reliable and secure.     Remember: Safety and storage devices are only as secure as the precautions you take to protect the key or combination to the lock.    RULES FOR KIDS  Adults should be aware that a child could discover a gun when a parent or another adult is not present. This could happen in the child's own home; the home of a neighbor, friend or relative; or in a public place such as a school or park. If this should happen, a child should know the following rules and be taught to  practice them.   Stop  The first rule for a child to follow if he/she finds or sees a gun is to stop what he/she is doing.  Don't Touch!  The second rule is for a child not to touch a gun he/she finds or sees. A child may think the best thing to do if he/she finds a gun is to pick it up and take it to an adult. A child needs to know he/she should NEVER touch a gun he/she may find or see.  Leave the Area  The third rule is to immediately leave the area. This would include never taking a gun away from another child or trying to stop someone from using gun.  Tell an Adult  The last rule is for a child to tell an adult about the gun he/she has seen. This includes times when other kids are playing with or shooting a gun.     METHODS OF CHILDPROOFING YOUR FIREARM  As a responsible handgun owner, you must recognize the need and be aware of the methods of childproofing your handgun, whether or not you have children.  Whenever children could be around, whether your own, or a friend's, relative's or neighbor's, additional safety steps should be taken when storing firearms and ammunition in your home.  [x] Always store your firearm unloaded.  [x] Use a firearms safety device AND store the firearm in a locked container.  [x] Store the ammunition separately in a locked container.  Always storing your firearm securely is the best method of childproofing your firearm; however, your choice of a storage place can add another element of safety. Carefully choose the storage place in your home especially if children may be around.  [x] Do not store your firearm where it is visible.  [x] Do not store your firearm in a bedside table, under your mattress or pillow, or on a closet shelf.  [x] Do not store your firearm among your valuables (such as jewelry or cameras) unless it is locked in a secure container.  [x] Consider storing firearms not possessed for self-defense in a safe and secure manner away from the home.    Everytown for Gun  Safety:  https://www.everytown.org       Gun Violence: Prediction, Prevention and Policy  American Psychological Association Panel of Experts Report  https://www.apa.org/pubs/reports/gun-violence-report.pdf     If you require further information pertaining to any of the issues outlined above, please reach out to your providers through the [facility] portal at [email or web address], or call [phone] to discuss.  See resource list for additional material.      IN CASE OF SUICIDAL THINKING, call the Ortho-tag Suicide TheJobPostline Number: 988    988 Suicide & Crisis Lifeline: 988 , 5-956-673-MCLR (8255)  Provides 24/7, free and confidential support for people in distress, prevention and crisis resources for you or your loved ones, and best practices for professionals.    Call, text or chat.  https://Pearl.com              REFERRAL RECOMMENDATIONS FOR SUBSTANCE ABUSE & MENTAL HEALTH      IN CASE OF SUICIDAL THINKING, call the Ortho-tag Suicide TheJobPostline Number: 988    988 Suicide & Crisis Lifeline: 988 , 9-222-524-OFXZ (8255)  https://Pearl.com       SUBSTANCE ABUSE:     [facility] (formerly known as the ABU)  [x] [phone], Option 2  [x] [address], [place] [address]  [x] [email or web address]  [x] [facility] delivers comprehensive and collaborative treatment for alcohol and substance use disorders.  Excellent program for working professionals or anyone else seeking recovery.  [x] Requires insurance approval prior to starting program, call number above for more information.  [x] Intensive Outpatient Rehabilitation Program - M-F 9am-3pm - daily groups with psychologists and social workers, sessions with MDs 3x per week   [x] Ambulatory detox and dual diagnosis available      SUBOXONE:     NOTE: some Suboxone clinics require their clients to participate in a structured program (such as an IOP) in order to be prescribed  Suboxone.  Some clinics have a long waiting list.  Most of these clinics do not accept walk-in clients, so call first to to learn what must be done to get started on Suboxone.    [facility] - [phone] (can do Sublocade)  [address], [place] [address]    [facility]  [address] [place], [place]  [phone]    [facility] - [phone] (can do Sublocade)  [address] [place] [address]    [facility]  [address] [place]  [phone]     [facility] (very short waiting list, may accept some walk-in's but call first if possible)  [address], [place] [address]  [phone]; [phone]    [facility]   [address] [place]    [phone]    [facility] (can usually be seen within a week but is cash only for appointment)  [address] [facility])  [address] [place]  [phone]    G ([facility])  [address] [place] [address]  [phone]    [place], [place]:    [facility] - [address] [place] [address] - Tel: [phone]    Robbie Turner - [address] [place] [address] - Tel: [phone]    [facility] - [address] [place] [address] - Tel: [phone]    Kd Blanco - [address] [place] [place] [address] - Tel: [phone]    Don Welsh - [address] [place] [address] - Tel: [phone]    [place], [place]:     Dr. Tiffany Taylor and Dr. Dixon Medina - [address], [place], [place] - Tel: [phone]    Dr. Melly Okeefe - [address] [place] - Tel: [phone]    Dr. Fidel Nieves - Tel: [phone]    [facility] - [phone]      METHADONE:     [facility] (the only methadone clinic in the [facility], has two locations)  [x] [place] - [address] [place] [address], [phone]  [x] [facility] [place] [address], [phone]      12 STEP PROGRAMS (and similar):     Alcoholics Anonymous (local)  [x] [phone]  [x] www.[place].org for schedules for in-person and online meetings  [x] There are AA meetings throughout the day all over town  [x] AA costs nothing to attend; they pass a basket for donations but this is not required    Narcotics Anonymous  [x] [phone]  [x] www.noana.org  [x] There are NA meetings throughout the day all over town  [x] NA costs nothing to attend; they pass a basket for donations but this is not required    Alcoholics Anonymous Online Intergroup (national)  [x] www.aa-intergroup.org  [x] Good resource for large, nation-wide meetings  [x] Can also attend smaller, local meetings in other cities  [x] Countless meetings all day and all night  [x] AA costs nothing to attend; they pass a basket for donations but this is not required    [facility] - 24/7 zoom meetings for women and coed - sign on anytime, anywhere!  [email or web address]-[email or web address]    Online Intergroup of AA - [address] [place] Meeting - 24/7 zoom meetings  https://aa-intergroup.org/meetings/    LOOKING FOR AN ALTERNATIVE TO 12 STEP PROGRAMS - check out:  [facility]: https://www.smartrecovery.org/about-us  [facility]: https://[email or web address]      DETOX UNITS (USUALLY 5-7 DAYS):     [facility]: [address], [place]  [phone], call first to ensure bed availability    [facility]: [address] [place]  [phone], Option 1, call first to ensure bed availability    [facility]: [address], [place]  [phone] (intake by appointment only)    [facility]: [address], [place]  [phone]      INTENSIVE OUTPATIENT PROGRAMS:     [facility] (formerly known as the ABU)  [x] [phone], Option 2  [x] [address], [place] [address]  [x] [email or web address]  [x] The  [facility] delivers comprehensive and collaborative treatment for alcohol and substance use disorders.  Excellent program for working professionals or anyone else seeking recovery.  [x] Requires insurance approval prior to starting program, call number above for more information.  [x] Intensive Outpatient Rehabilitation Program - M-F 9am-3pm - daily groups with psychologists and social workers, sessions with MDs 3x per week   [x] Ambulatory detox and dual diagnosis available    [facility] Intensive Outpatient Program  [x] [phone]  [x] [facility] (the clinic not on [facility])  [x] Call number above for more info and to check insurance requirements    [facility]  [address] [place], [place] [address] [phone]    [facility]:  [address] [place], [place] [address]?, [phone]  [address] [place], [place] [address]?, [phone]    RESIDENTIAL REHABS (USUALLY 28 DAYS):     [facility]: [address], [phone]    [facility]: [address] , [facility]  [phone] (intake by appointment only)    [facility] (men only) [address] [facility], [phone]    D[facility] V[facility] (Female only) [address] [facility], [phone]    [facility]: [address], [facility], men's program [phone], women's program [phone]    [facility]: [address], [facility], [phone]    [facility]: [address], [place], [place], [phone]    [facility]: Men only, [phone], [address] [place] [facility]: [address], [place], [place], [phone]    [facility]: [address] [place] [address],  [phone]  New Location: [address] [place] [address] [address], [place] [address], [phone]    [facility]:   ?[address], [place] [address]?[phone]    Lg: [address],  [place] [address], [phone]    [place]: [place], [phone]     [facility]: [place], [place], [phone]    [facility]: [place], [phone]    [facility]: [place], [place], [phone]    [place]: [place], [phone]    [facility] [address], [place] [address], [phone]    COMMUNITY ADDICTION CLINICS:     [facility]: [address] [facility], [address] [place], [phone] -or- [address] [place] [place] [address]    [facility] [place]: [address] [facility] [facility], [place]  [address]     [facility]: Clinics [phone]; Crisis [phone]    [facility]: [address] [place] [address]    [facility]: [address] [place] [address]    [facility]: [address] [place] [address],    [facility]: 2nd Floor [address] [place] [address]    [facility]: [address] [facility] [place] [address]    [facility], [address] [place] [address]    [facility]: [address] [facility], [place] [phone]  (serves youth 16-23 years old)    [facility]: Al/St. Boyd/Roger/Marcos/[place] [phone]    [facility]: [address] [facility], [place] [phone]    [facility]: [address], [facility] [phone]    [facility]: [address] [facility], [facility], [phone]     [facility]: [address] [facility] Quique Qu[address]z, [phone], [phone]    RESOURCES IN OTHER [facility]:     [facility]: [address] [place], [phone], [phone]    [facility]: [address] [facility], [address], [phone]    [facility],  "[address] [facility], [phone]    [facility]: [address] [facility], [place], [phone]    [facility], [address] [facility], [phone] ([facility])    [facility], [address] [facility], [phone] ([facility])    [facility], [address], [address], [place], [phone] ([place], [place], and [facility])    [facility], [address] [facility], [phone] ([facility])    [facility] Hotline [phone], [phone]    [facility], [address] [facility], [facility], [address] [facility], [address], [facility] Center, [address], [facility] Behavioral [facility], 500 [facility]. [address]., [facility] Behavioral [facility], [address], [facility], [address] [facility], [address] [phone]    [facility] Human Services, 302 [facility] [phone]    [facility], [address] [facility], [phone]    [facility]. for Addiction Recovery, [address] [facility], [phone]      Kiswahili SPEAKING (en español):     Información de la reunión de Alcohólicos Anónimos  René [facility], 10:00 am  Habla español  Esta reunión está abierta y cualquiera puede asistir.    Irish speaking Alcoholics anonymous meetings:  El "René [place] AA Skype" es un rené on line de Alcohólicos Anónimos en español. El rené es bo, gratuito y virtual a través de Skype Audio. El rené funciona mediante latoya llamada grupal de voz, por lo que no se utiliza la videollamada, ni se pueden donald las imágenes o rostros de los participantes. Hace mela años y medio abrimos el primer René de AA " por Skype en [place], katty actualmente asisten personas desde [place], [place], [place], [place], [place], [place], [place], [place], [place], [place], [place] y [place], entre otros.    El melida es muy útil para los alcohólicos que residen en lugares donde no se celebran reuniones de AA, o residen en lugares donde las reuniones de AA son un número limitado de días a la semana, o para aquellos compañeros que se hayan de viaje o que, por cualquier motivo, se hayan convalecientes y no pueden desplazarse. Todos los días nos reuniones a las 21:00 (hora española)    Podéis obtener más información sobre el melida y dale sesiones en la página web [email or web address]      MENTAL HEALTH:     [facility] - Outpatient Clinic  [phone]    [facility] - General Psychiatry Intensive Outpatient Program  [facility] (formerly known as the BMU)  [phone], option 3    [facility] - Dual Diagnosis Intensive Outpatient Program  [facility] (formerly known as the ABU)  [phone], option 2      COMMUNITY MENTAL HEALTH CENTERS:     [facility]  (aka [facility], [facility])  Serves [facility].  Serves uninsured patients & those with Medicaid.  Main location: [address] [place] [address]  [phone]  Walk-in's available during regular business hours.  24/7 Crisis Line: [phone]    [facility]  (aka [facility])  Serves [facility] residents.  Serves uninsured patients, those with Medicaid and some private plans.  Walk-in's available during regular business hours.  Primary care services available as well.  [facility]: [address] [place] [address];  [phone]  [place]: [address], [place]  [address];  [phone]  24/7 Crisis Line: [phone]    [facility]  Serves uninsured patients & those with Medicaid, call for more info.  Primary care, pediatrics, HIV treatment, and dentistry services available as well.  Three locations.  [phone]    [facility] [place]Corporate Office  Serves patients with Medicaid, Medicare, and private insurance  [address]  [place] [address]  [phone]    [facility]  Serves uninsured on a sliding scale, as well as Medicaid, Medicare, and private plans.  Eight locations around the [facility] area.  [phone]    [facility]  Serves uninsured patients & those with Medicaid, private insurances.  Primary care available as well.  [phone]  [address] [place] [address]    [facility] Outpatient Psychiatry  Serves veterans who were honorably discharged.  [address] [place] [address] [phone]  24/7 Veterans Crisis Line: [phone] (Press 1)    If you have private insurance and need to find a specialist, please contact your insurance network to request a list of providers covered by your benefits.      MENTAL HEALTH/ADDICTIVE DISORDERS:     AA ([phone]), NA ([phone])   National Suicide Prevention Lifeline- Call 1-683.485.9557 Available 24 hours everyday  [facility] [phone]; Crisis Line [phone] - Call for options A-F:  Intensive Outpatient Treatment/ Day programs   [facility], please contact   [facility], please contact [phone] or [phone] to speak with an admissions counselor.  [facility] Group (Methadone Maintenance)   [address] [place] [address], [phone]  [address] [place] [place] [address] [phone]  [facility], [address] [place] [address], [phone]  [facility] [phone]  [facility] please contact [phone]  [facility], [address], 4th [place] [address] Phone: [phone]   [facility]  [place] Office: [address] [place] [address], [phone]  [place] Office: [address] [facility], [address], [place] [address], [phone]  [place] Office: [address] [place] [address] [phone]    Outpatient Substance Abuse Treatment   [facility] Health Group (Methadone Maintenance)   [address] [place] [address], [phone]  [address], [place] [address] [phone]  [facility], [address] [place] [address], [phone]  Melinda [place] Office: [address] [place] [address], [phone]  [place] Office: [address] [facility], [address], [place] [address], [phone]  [place] Office: [address] [place] [address] [phone]  [facility], [address] [place], [place] [address] [phone]   [facility], [address] [facility], [address], [phone]  [facility], [address] [place], [place] [phone]    Residential Substance Abuse Treatment   [facility] [address] [facility], [phone] or [phone]  [facility], [address] [facility], [phone]  [facility] (men only) [address] [place] [address], [phone]  Women at the [facility] (women only) [address] [place] [address] [phone]  [facility] [address], [place] [address] [phone]  [facility] [place] (women only), intakes at [address] [facility], [phone]  [facility] (7-day program, $100, [address], [phone], [phone], [phone])  [facility] (Men only, [phone]), [address], [place] (Vets*/Non-Vets)  [facility] (Men only, $400/month program fee) [address],  [phone]  [facility] (Women over age 39 only), [address] [facility], [phone]    Out of Area:    [place], [address], [place], [place] ([phone])  [facility] (men only), [address] [place] [address], [phone]  [facility], [address] [place], [place] [phone])  [place], [facility] [place], [place] ([phone])  [facility] Addiction [facility], [address] [facility], [phone]  [facility] (Women only, has to have mental illness, can be homeless or substance abuser), [phone]        DOMESTIC VIOLENCE RESOURCES:     Advocacy  [facility] ([facility])  [address] [address], [place] [address]    [facility] ? [phone]  Services provided: emergency shelter, individual advocacy, information and referrals, group support, children's program, medical advocacy, forensic medical exams, primary care, legal assistance, counseling, safety planning, and caregiver support    [facility] [place] HEALING AND EMPOWERMENT [place]  Confidential location  [facility] ? [phone]  Services provided: short term emergency shelter, all services provided are free of charge    [facility] CENTERS FOR COMMUNITY ADVOCACY  Multiple locations in [facility], and [facility] ([place], [place], and [facility] ? [phone]  Services provided: emergency shelter, individual advocacy, information and referrals, group support, children's program, medical advocacy, legal assistance in obtaining restraining orders, counseling, safety planning, and caregiver support    [facility]   Emergency Shelter   [phone]  Emergency Services ,Legal and Financial Assistance Services ,Housing Services ,Support Services     [facility]   [phone]  Emergency Services ,Counseling Services , Housing Services ,Support Services ,Children's Services      WOMEN WITH A VISION  [address] [place] [address]  WWAV ? [phone]  Services provided: advocacy, health education and supportive services, specializing in free healing services for marginalized groups, including LGBTQ individuals and sex workers    SEXUAL TRAUMA AWARENESS AND RESPONSE (STAR)  [address] [place] [address]    STAR ? [phone] 031-[phone]  Services provided: individual advocacy, information and referrals, group support, medical advocacy, legal assistance, counseling, and safety planning for survivors of sexual assault    [facility])  [address] [place] [address]  [facility] Forensic Program ? [phone]  Services provided: free forensic medical exams for sexual assault and domestic violence, which can be performed up to 5 days after an incident. It is not necessary to make a police report to receive a forensic medical exam    Legal  [facility]  [address] [address]  Project SAVE ? [phone]  Services provided: free emergency legal representation for survivors of doemstic violence residing in [facility]. Legal services may include temporary restraining orders, temporary child support, custody, and use of property    [facility] LEGAL SERVICES (SLLS)  [address] [facility] [address], [place] [address]  SLLS ? [phone]  Services provided: free legal representation for survivors of domestic violence residing in [facility]. Legal services may include temporary child support, custody, and divorce      HOTLINES:     [facility] DOMESTIC VIOLENCE HOTLINE  [phone]    Services provided: free and confidential hotline for victims and survivors of domestic violence. All calls will be routed to a domestic violence service provided in the victim or survivor's area    NATIONAL HUMAN TRAFFICKING HOTLINE  [phone]    Services provided: national anti-trafficking hotline serving victims and  survivors of human trafficking. Provides information about local resources, and access to safe space to report tips, seek services, and ask for help    VIA LINK  211 or [phone]    Service provided: counselors can provide crisis counseling. Counselors can also provide information and referrals to programs which can help with needs such as food, shelter, medical care, financial assistance, mental health services, substance abuse treatment, senior services, childcare, and more      HOMELESS SHELTERS:      Homeless shelters  [facility]  Emergency shelter for individuals and families  [address]  [phone]  [facility]  Emergency shelter for men only  Meals daily 6am, 2pm, & 6pm  Clothing, case management M-F by appointment (ID/job/housing/legal assistance), mail  [address] [facility]  [phone]  [facility]  Emergency shelter for men  [address] Yen [facility]  [phone]  Emergency shelter for women  [address] [facility]  [phone]  Breakfast & lunch daily, dinner M-F  Case management, job counseling services   [facility]  Emergency shelter for teens and young adults up to 20yo  [address] [facility]  [phone]  [facility]  Emergency shelter for women over 19yo and their kids  [address] [place], [place] [address] [phone]  [facility]  Day program, meals M-F 1PM (arrive early)  Showers, laundry, hygiene kits, showers, phones, , notary services, case management, ID assistance  [address] [facility]  [phone] M-F 8am-2:30pm  [facility]  Day program  MTWF 7:30am-3:30pm,  8:30am-3:30pm  Crisis intervention, employment assistance, food/clothing, hygiene kits, bus tokens, mail  [address] [facility] B  [phone]  [facility]  Mobile outreach for homeless persons in [facility]  [phone]  Healthcare for the Homeless  Primary healthcare, case management, dental services, TB placement  Call ahead  [address]  Floor  [phone]  [facility] at the [facility]  Connects homeless people with their loved ones in other cities by providing transportation costs   [phone]      [place] RESOURCES:     [place] Mobile Mental Health Crisis Response Team:    Region 12 ([place], [place], [place], and [facility]) ([facility] and [facility])  [phone]      Outpatient Mental Health & Addiction Clinic Resources for both [facility] and [facility]:    [facility] Mental Healthcare Resources  Website: [email or web address]  Main Number: 050-452-0369    [facility] [place] ([facility])  [address] ([address] [facility]) [place] [address]  [phone]    [facility] ([facility] Area)  [address] ([address] [facility] [place] [place]) [place] [address]  [phone]    [facility]  [address] ([address]) [place] [address]  [phone]    [facility]  [address] ([address] [facility]) [place] [address]  [phone]      Addiction Treatment Resources for both [facility] and [facility]:    [facility] (Detox, Residential, PHP, IOP, and Aftercare Programs)  [address] [place] [address]  [phone]    [facility] (Residential, IOP, Transitional Living, and Aftercare Programs)  [address] [place] [address]  [phone]    [facility] (Inpatient, Residential, Detox, IOP, Outpatient, and Aftercare Programs)  [address] [facility], [place] [address] [phone] or toll free at [phone]      Outpatient Mental Health Psychotherapy Resources for both [facility] and [facility]:    Josseline Gar, LCSW  [address]  [place] [address]  [phone]  Specialties: Depression, Anxiety, and Life Transitions    Anya Borges, PhD  [address]  [place] [address]  [phone]  Specialties: Testing and Evaluation, Education and Learning Disabilities, and ADHD    Dasha Mendez, [facility] [address] [place] [address]  [phone]  Specialties: Obsessive-Compulsive (OCD), Depression, and Relationship Issues    Kellen Barboza LPC [address] [place] [facility] [place] [address]  [phone]  Specialties: Trauma & PTSD, Mood Disorders, and Anxiety    Kellen Mack, PhD, [facility] [address] [place] [address]  [phone]  Specialties: Family Conflict, Child, and Relationship Issues    Genevieve Gerardo [facility] [place] [address] [phone]  Specialties: Anxiety, Depression, and Anger Management        IN CASE OF SUICIDAL THINKING, call the National Suicide Hotline Number: 988    988 Suicide & Crisis Lifeline: 988 , 0-455-806-TALK (2455)  Provides 24/7, free and confidential support for people in distress, prevention and crisis resources for you or your loved ones, and best practices for professionals.    Call, text or chat.  https://988Mass Appe[place].org

## 2023-04-23 NOTE — ED NOTES
Brayden Francois now accepted in [facility] by Dr. Zarate; call report to:  [phone], ext [phone].  Transfer Center arranging transport.
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
## 2023-05-09 DIAGNOSIS — M25.561 BILATERAL KNEE PAIN: Primary | ICD-10-CM

## 2023-05-09 DIAGNOSIS — N64.4 MASTODYNIA: Primary | ICD-10-CM

## 2023-05-09 DIAGNOSIS — M25.562 BILATERAL KNEE PAIN: Primary | ICD-10-CM

## 2023-05-24 ENCOUNTER — HOSPITAL ENCOUNTER (EMERGENCY)
Facility: HOSPITAL | Age: 33
Discharge: PSYCHIATRIC OR SUBSTANCE USE FACILITY | End: 2023-05-24
Attending: EMERGENCY MEDICINE
Payer: MEDICAID

## 2023-05-24 VITALS
OXYGEN SATURATION: 98 % | RESPIRATION RATE: 18 BRPM | HEART RATE: 123 BPM | TEMPERATURE: 99 F | DIASTOLIC BLOOD PRESSURE: 103 MMHG | BODY MASS INDEX: 28.28 KG/M2 | WEIGHT: 186 LBS | SYSTOLIC BLOOD PRESSURE: 181 MMHG

## 2023-05-24 DIAGNOSIS — R45.850 HOMICIDAL IDEATION: Primary | ICD-10-CM

## 2023-05-24 LAB
ALBUMIN SERPL BCP-MCNC: 3.8 G/DL (ref 3.5–5.2)
ALP SERPL-CCNC: 67 U/L (ref 55–135)
ALT SERPL W/O P-5'-P-CCNC: 31 U/L (ref 10–44)
AMPHET+METHAMPHET UR QL: ABNORMAL
ANION GAP SERPL CALC-SCNC: 13 MMOL/L (ref 8–16)
APAP SERPL-MCNC: <3 UG/ML (ref 10–20)
AST SERPL-CCNC: 31 U/L (ref 10–40)
BARBITURATES UR QL SCN>200 NG/ML: ABNORMAL
BASOPHILS # BLD AUTO: 0.04 K/UL (ref 0–0.2)
BASOPHILS NFR BLD: 0.4 % (ref 0–1.9)
BENZODIAZ UR QL SCN>200 NG/ML: NEGATIVE
BILIRUB SERPL-MCNC: 0.3 MG/DL (ref 0.1–1)
BILIRUB UR QL STRIP: NEGATIVE
BUN SERPL-MCNC: 24 MG/DL (ref 6–20)
BZE UR QL SCN: NEGATIVE
CALCIUM SERPL-MCNC: 9.1 MG/DL (ref 8.7–10.5)
CANNABINOIDS UR QL SCN: NEGATIVE
CHLORIDE SERPL-SCNC: 104 MMOL/L (ref 95–110)
CLARITY UR: CLEAR
CO2 SERPL-SCNC: 21 MMOL/L (ref 23–29)
COLOR UR: YELLOW
CREAT SERPL-MCNC: 1.1 MG/DL (ref 0.5–1.4)
CREAT UR-MCNC: 38.6 MG/DL (ref 23–375)
DIFFERENTIAL METHOD: ABNORMAL
EOSINOPHIL # BLD AUTO: 0.1 K/UL (ref 0–0.5)
EOSINOPHIL NFR BLD: 0.9 % (ref 0–8)
ERYTHROCYTE [DISTWIDTH] IN BLOOD BY AUTOMATED COUNT: 12.7 % (ref 11.5–14.5)
EST. GFR  (NO RACE VARIABLE): >60 ML/MIN/1.73 M^2
ETHANOL SERPL-MCNC: <10 MG/DL
GLUCOSE SERPL-MCNC: 116 MG/DL (ref 70–110)
GLUCOSE UR QL STRIP: NEGATIVE
HCT VFR BLD AUTO: 38.8 % (ref 40–54)
HGB BLD-MCNC: 13.2 G/DL (ref 14–18)
HGB UR QL STRIP: NEGATIVE
IMM GRANULOCYTES # BLD AUTO: 0.05 K/UL (ref 0–0.04)
IMM GRANULOCYTES NFR BLD AUTO: 0.5 % (ref 0–0.5)
KETONES UR QL STRIP: NEGATIVE
LEUKOCYTE ESTERASE UR QL STRIP: NEGATIVE
LYMPHOCYTES # BLD AUTO: 2.4 K/UL (ref 1–4.8)
LYMPHOCYTES NFR BLD: 25.1 % (ref 18–48)
MCH RBC QN AUTO: 30.4 PG (ref 27–31)
MCHC RBC AUTO-ENTMCNC: 34 G/DL (ref 32–36)
MCV RBC AUTO: 89 FL (ref 82–98)
METHADONE UR QL SCN>300 NG/ML: NEGATIVE
MONOCYTES # BLD AUTO: 1 K/UL (ref 0.3–1)
MONOCYTES NFR BLD: 10.2 % (ref 4–15)
NEUTROPHILS # BLD AUTO: 6.1 K/UL (ref 1.8–7.7)
NEUTROPHILS NFR BLD: 62.9 % (ref 38–73)
NITRITE UR QL STRIP: NEGATIVE
NRBC BLD-RTO: 0 /100 WBC
OPIATES UR QL SCN: NEGATIVE
PCP UR QL SCN>25 NG/ML: NEGATIVE
PH UR STRIP: 6 [PH] (ref 5–8)
PLATELET # BLD AUTO: 282 K/UL (ref 150–450)
PMV BLD AUTO: 8.4 FL (ref 9.2–12.9)
POTASSIUM SERPL-SCNC: 4.3 MMOL/L (ref 3.5–5.1)
PROT SERPL-MCNC: 6.4 G/DL (ref 6–8.4)
PROT UR QL STRIP: NEGATIVE
RBC # BLD AUTO: 4.34 M/UL (ref 4.6–6.2)
SODIUM SERPL-SCNC: 138 MMOL/L (ref 136–145)
SP GR UR STRIP: 1 (ref 1–1.03)
TOXICOLOGY INFORMATION: ABNORMAL
TSH SERPL DL<=0.005 MIU/L-ACNC: 1.12 UIU/ML (ref 0.4–4)
URN SPEC COLLECT METH UR: NORMAL
UROBILINOGEN UR STRIP-ACNC: NEGATIVE EU/DL
WBC # BLD AUTO: 9.73 K/UL (ref 3.9–12.7)

## 2023-05-24 PROCEDURE — 81003 URINALYSIS AUTO W/O SCOPE: CPT | Mod: 59 | Performed by: EMERGENCY MEDICINE

## 2023-05-24 PROCEDURE — 99215 PR OFFICE/OUTPT VISIT, EST, LEVL V, 40-54 MIN: ICD-10-PCS | Mod: AF,HB,95, | Performed by: PSYCHIATRY & NEUROLOGY

## 2023-05-24 PROCEDURE — 99215 OFFICE O/P EST HI 40 MIN: CPT | Mod: AF,HB,95, | Performed by: PSYCHIATRY & NEUROLOGY

## 2023-05-24 PROCEDURE — 80053 COMPREHEN METABOLIC PANEL: CPT | Performed by: EMERGENCY MEDICINE

## 2023-05-24 PROCEDURE — 99285 EMERGENCY DEPT VISIT HI MDM: CPT

## 2023-05-24 PROCEDURE — 36415 COLL VENOUS BLD VENIPUNCTURE: CPT | Performed by: EMERGENCY MEDICINE

## 2023-05-24 PROCEDURE — 80143 DRUG ASSAY ACETAMINOPHEN: CPT | Performed by: EMERGENCY MEDICINE

## 2023-05-24 PROCEDURE — 80307 DRUG TEST PRSMV CHEM ANLYZR: CPT | Performed by: EMERGENCY MEDICINE

## 2023-05-24 PROCEDURE — 82077 ASSAY SPEC XCP UR&BREATH IA: CPT | Performed by: EMERGENCY MEDICINE

## 2023-05-24 PROCEDURE — 85025 COMPLETE CBC W/AUTO DIFF WBC: CPT | Performed by: EMERGENCY MEDICINE

## 2023-05-24 PROCEDURE — 84443 ASSAY THYROID STIM HORMONE: CPT | Performed by: EMERGENCY MEDICINE

## 2023-05-24 NOTE — ED PROVIDER NOTES
"Encounter Date: 5/24/2023    SCRIBE #1 NOTE: I, Radha Shashi, am scribing for, and in the presence of,  Julien Bansal MD.     History     Chief Complaint   Patient presents with    Psychiatric Evaluation     Pt is homicidal, pt made statements that he wants to kill his parents.       Time seen by provider: 4:54 PM on 05/24/2023    Brayden Francois is a 33 y.o. male with no PMHx documented who presents to the ED via police escort under [facility] s/p an incident earlier today.  Per [facility] brought in by the police, the patient was threatening to "kill his father with knives" as well as "kill his mother if she did not give the patient her car keys and her pain meds."  Upon arrival to the ED the patient is complaining that his father is the one that "actually needs a psychiatric evaluation because he was the reason I was in here last time."      He states that he was speaking with his mother earlier today in a cordial tone and was not aggressive or threatening them "at all."  Patient also notes that his mother was acting unconcerned about him and he confirms having problems with his parents recently since being released from skilled nursing after a 7 year stay.  He currently resides with his mother alone and mentions his father "lives in a hotel or something."  Patient admits a Hx of hallucinations (mostly in skilled nursing) and notes that he is prescribed "crazy meds" (Cymbalta and Depakote) to manage them currently.  The patient denies SI, HI, hallucinations right now, or any other symptoms at this time.  He also references a cut to the anterior L wrist when cutting "something" in the yard several days ago that his mother bandaged with glue and steri strips.  He also specifies that he re-opened the wound yesterday while "running."  Per patient, he is UTD with his tetanus vaccination.  He has no pertinent PSHx.  Patient is a current smoker that drinks EtOH occasionally.  He denies illicit drug use.      The history is provided by the patient " and the police.   Review of patient's allergies indicates:  No Known Allergies  No past medical history on file.  Past Surgical History:   Procedure Laterality Date    APPENDECTOMY      FRACTURE SURGERY      TONSILLECTOMY       No family history on file.  Social History     Tobacco Use    Smoking status: Every Day     Packs/day: 1.00     Years: 12.00     Pack years: 12.00     Types: Cigarettes    Smokeless tobacco: Former   Substance Use Topics    Alcohol use: No    Drug use: No     Review of Systems   Respiratory:  Negative for shortness of breath.    Cardiovascular:  Negative for chest pain.   Skin:  Positive for wound (L wrist).   Psychiatric/Behavioral:  Positive for behavioral problems and hallucinations (manged with meds). Negative for suicidal ideas.         Negative for HI.     All other systems reviewed and are negative.    Physical Exam     Initial Vitals [05/24/23 1649]   BP Pulse Resp Temp SpO2   (!) 160/100 (!) 120 18 98.9 °F (37.2 °C) 100 %      MAP       --         Physical Exam    Nursing note and vitals reviewed.  Constitutional: He appears well-developed and well-nourished. He is not diaphoretic.  Non-toxic appearance. He does not have a sickly appearance. He does not appear ill. No distress.   HENT:   Head: Normocephalic and atraumatic.   Eyes: EOM are normal.   Neck: Neck supple.   Normal range of motion.  Cardiovascular:  Normal rate, regular rhythm and normal heart sounds.     Exam reveals no gallop and no friction rub.       No murmur heard.  Pulmonary/Chest: Breath sounds normal. No respiratory distress. He has no wheezes. He has no rhonchi. He has no rales.   Musculoskeletal:         General: Normal range of motion.      Cervical back: Normal range of motion and neck supple. No rigidity. Normal range of motion.      Comments: Normal wrist flexion.  No evidence of tendon injury.     Neurological: He is alert and oriented to person, place, and time.   Skin: Skin is warm and dry. Laceration  noted. No rash noted.   Patient has a small laceration to the L wrist on the flexor side.      Extensive tattooing   Psychiatric: He has a normal mood and affect. His behavior is normal. Judgment and thought content normal. His speech is rapid and/or pressured. He expresses no homicidal and no suicidal ideation.   Patient denies SI and HI.         ED Course   Procedures  Labs Reviewed   CBC W/ AUTO DIFFERENTIAL - Abnormal; Notable for the following components:       Result Value    RBC 4.34 (*)     Hemoglobin 13.2 (*)     Hematocrit 38.8 (*)     MPV 8.4 (*)     Immature Grans (Abs) 0.05 (*)     All other components within normal limits   COMPREHENSIVE METABOLIC PANEL - Abnormal; Notable for the following components:    CO2 21 (*)     Glucose 116 (*)     BUN 24 (*)     All other components within normal limits   DRUG SCREEN PANEL, URINE EMERGENCY - Abnormal; Notable for the following components:    Barbiturate Screen, Ur Presumptive Positive (*)     Amphetamine Screen, Ur Presumptive Positive (*)     All other components within normal limits    Narrative:     Specimen Source->Urine   ACETAMINOPHEN LEVEL - Abnormal; Notable for the following components:    Acetaminophen (Tylenol), Serum <3.0 (*)     All other components within normal limits   TSH   URINALYSIS, REFLEX TO URINE CULTURE    Narrative:     Specimen Source->Urine   ALCOHOL,MEDICAL (ETHANOL)          Imaging Results    None          Medications - No data to display  Medical Decision Making:   History:   Old Medical Records: I decided to obtain old medical records.  Old Records Summarized: records from another hospital.  Clinical Tests:   Lab Tests: Ordered and Reviewed        Scribe Attestation:   Scribe #1: I performed the above scribed service and the documentation accurately describes the services I performed. I attest to the accuracy of the note.      ED Course as of 05/24/23 1932   Wed May 24, 2023   1652 SpO2: 100 % [EF]   1652 Resp: 18 [EF]   1652  Pulse(!): 120 [EF]   1652 Temp Source: Oral [EF]   1652 Temp: 98.9 °F (37.2 °C) [EF]   1652 BP(!): 160/100 [EF]      ED Course User Index  [EF] Julien Bansal MD               I, Dr. Bansal, personally performed the services described in this documentation. All medical record entries made by the scribe were at my direction and in my presence.  I have reviewed the chart and agree that the record reflects my personal performance and is accurate and complete.7:32 PM 05/24/2023      Clinical Impression:   Final diagnoses:  [R45.850] Homicidal ideation (Primary)        ED Disposition Condition    Transfer to Psych Facility Stable          ED Prescriptions    None       Follow-up Information    None               33-year-old male presents for the 2nd time in 2 months for homicidal ideation regarding his parents.  He is under an order for protective custody.  Telemetry psychiatry consultation recommends inpatient placement.  At this time he is cooperative.  He is medically clear.     Julien Bansal MD  05/24/23 1933
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
## 2023-05-25 PROBLEM — Z13.9 ENCOUNTER FOR MEDICAL SCREENING EXAMINATION: Status: ACTIVE | Noted: 2023-05-25

## 2023-05-25 PROBLEM — R03.0 ELEVATED BP WITHOUT DIAGNOSIS OF HYPERTENSION: Status: ACTIVE | Noted: 2023-05-25

## 2023-05-25 PROBLEM — S60.812A ABRASION OF LEFT WRIST: Status: ACTIVE | Noted: 2023-05-25

## 2023-05-25 NOTE — ED NOTES
Pt became agitated when the ems transport arrived.  Pt was talked into getting back on the gurney, as restraints were being placed on pt he tried to get off gurney.  Pt was coaxed back on the gurney, then placed into four point restraints.  Pt then moved to ambulance with no further incident.

## 2023-05-25 NOTE — ED NOTES
Report to [facility], patient becoming agitated and questioning transfer. Educated on Plan of care.

## 2023-05-25 NOTE — ED NOTES
Call received from [facility] informing us that a second EMS unit was called to assist, as they are already enroute to [facility]. Deangelo/Sana at [facility] informed of incident.

## 2023-05-27 PROBLEM — F19.10 SUBSTANCE ABUSE: Status: ACTIVE | Noted: 2023-05-27

## 2023-05-31 PROBLEM — Z13.9 ENCOUNTER FOR MEDICAL SCREENING EXAMINATION: Status: RESOLVED | Noted: 2023-05-25 | Resolved: 2023-05-31

## 2023-05-31 PROBLEM — R03.0 ELEVATED BP WITHOUT DIAGNOSIS OF HYPERTENSION: Status: RESOLVED | Noted: 2023-05-25 | Resolved: 2023-05-31

## 2023-05-31 PROBLEM — S60.812A ABRASION OF LEFT WRIST: Status: RESOLVED | Noted: 2023-05-25 | Resolved: 2023-05-31

## 2023-08-03 ENCOUNTER — HOSPITAL ENCOUNTER (OUTPATIENT)
Dept: RADIOLOGY | Facility: HOSPITAL | Age: 33
Discharge: HOME | End: 2023-08-03
Payer: MEDICAID

## 2023-08-03 DIAGNOSIS — N64.4 MASTODYNIA: ICD-10-CM

## 2023-08-03 PROCEDURE — 77066 DX MAMMO INCL CAD BI: CPT | Mod: TC,PO

## 2023-09-20 ENCOUNTER — ANESTHESIA (OUTPATIENT)
Dept: SURGERY | Facility: HOSPITAL | Age: 33
DRG: 854 | End: 2023-09-20
Payer: COMMERCIAL

## 2023-09-20 ENCOUNTER — HOSPITAL ENCOUNTER (INPATIENT)
Facility: HOSPITAL | Age: 33
LOS: 6 days | Discharge: HOME | DRG: 854 | End: 2023-09-26
Attending: EMERGENCY MEDICINE | Admitting: HOSPITALIST
Payer: MEDICAID

## 2023-09-20 ENCOUNTER — ANESTHESIA EVENT (OUTPATIENT)
Dept: SURGERY | Facility: HOSPITAL | Age: 33
DRG: 854 | End: 2023-09-20
Payer: MEDICAID

## 2023-09-20 DIAGNOSIS — K57.80 PERFORATED DIVERTICULUM: Primary | ICD-10-CM

## 2023-09-20 DIAGNOSIS — Z93.3 COLOSTOMY IN PLACE: ICD-10-CM

## 2023-09-20 DIAGNOSIS — R07.9 CHEST PAIN: ICD-10-CM

## 2023-09-20 DIAGNOSIS — K63.1 PERFORATION BOWEL: ICD-10-CM

## 2023-09-20 PROBLEM — A41.9 SEVERE SEPSIS: Status: ACTIVE | Noted: 2023-09-20

## 2023-09-20 PROBLEM — R65.20 SEVERE SEPSIS: Status: ACTIVE | Noted: 2023-09-20

## 2023-09-20 LAB
ALBUMIN SERPL BCP-MCNC: 4.4 G/DL (ref 3.5–5.2)
ALP SERPL-CCNC: 68 U/L (ref 55–135)
ALT SERPL W/O P-5'-P-CCNC: 15 U/L (ref 10–44)
ANION GAP SERPL CALC-SCNC: 16 MMOL/L (ref 8–16)
AST SERPL-CCNC: 19 U/L (ref 10–40)
BASOPHILS NFR BLD: 0 % (ref 0–1.9)
BILIRUB SERPL-MCNC: 0.8 MG/DL (ref 0.1–1)
BUN SERPL-MCNC: 18 MG/DL (ref 6–20)
CALCIUM SERPL-MCNC: 9.9 MG/DL (ref 8.7–10.5)
CHLORIDE SERPL-SCNC: 100 MMOL/L (ref 95–110)
CO2 SERPL-SCNC: 20 MMOL/L (ref 23–29)
CREAT SERPL-MCNC: 1.1 MG/DL (ref 0.5–1.4)
DIFFERENTIAL METHOD: ABNORMAL
EOSINOPHIL NFR BLD: 0 % (ref 0–8)
ERYTHROCYTE [DISTWIDTH] IN BLOOD BY AUTOMATED COUNT: 12.4 % (ref 11.5–14.5)
EST. GFR  (NO RACE VARIABLE): >60 ML/MIN/1.73 M^2
GLUCOSE SERPL-MCNC: 124 MG/DL (ref 70–110)
HCT VFR BLD AUTO: 47.6 % (ref 40–54)
HGB BLD-MCNC: 15.7 G/DL (ref 14–18)
IMM GRANULOCYTES # BLD AUTO: ABNORMAL K/UL (ref 0–0.04)
IMM GRANULOCYTES NFR BLD AUTO: ABNORMAL % (ref 0–0.5)
LACTATE SERPL-SCNC: 2 MMOL/L (ref 0.5–2.2)
LIPASE SERPL-CCNC: 8 U/L (ref 4–60)
LYMPHOCYTES NFR BLD: 6 % (ref 18–48)
MCH RBC QN AUTO: 28.5 PG (ref 27–31)
MCHC RBC AUTO-ENTMCNC: 33 G/DL (ref 32–36)
MCV RBC AUTO: 87 FL (ref 82–98)
MONOCYTES NFR BLD: 9 % (ref 4–15)
NEUTROPHILS NFR BLD: 85 % (ref 38–73)
NRBC BLD-RTO: 0 /100 WBC
PLATELET # BLD AUTO: 365 K/UL (ref 150–450)
PLATELET BLD QL SMEAR: ABNORMAL
PMV BLD AUTO: 8.3 FL (ref 9.2–12.9)
POTASSIUM SERPL-SCNC: 4.2 MMOL/L (ref 3.5–5.1)
PROT SERPL-MCNC: 8.3 G/DL (ref 6–8.4)
RBC # BLD AUTO: 5.5 M/UL (ref 4.6–6.2)
SODIUM SERPL-SCNC: 136 MMOL/L (ref 136–145)
WBC # BLD AUTO: 25.71 K/UL (ref 3.9–12.7)

## 2023-09-20 PROCEDURE — 11000001 HC ACUTE MED/SURG PRIVATE ROOM

## 2023-09-20 PROCEDURE — D9220A PRA ANESTHESIA: Mod: CRNA,,, | Performed by: NURSE ANESTHETIST, CERTIFIED REGISTERED

## 2023-09-20 PROCEDURE — 36415 COLL VENOUS BLD VENIPUNCTURE: CPT | Performed by: NURSE PRACTITIONER

## 2023-09-20 PROCEDURE — 85007 BL SMEAR W/DIFF WBC COUNT: CPT | Performed by: PHYSICIAN ASSISTANT

## 2023-09-20 PROCEDURE — 85027 COMPLETE CBC AUTOMATED: CPT | Performed by: PHYSICIAN ASSISTANT

## 2023-09-20 PROCEDURE — 25000003 PHARM REV CODE 250: Performed by: ANESTHESIOLOGY

## 2023-09-20 PROCEDURE — 80053 COMPREHEN METABOLIC PANEL: CPT | Performed by: PHYSICIAN ASSISTANT

## 2023-09-20 PROCEDURE — 83605 ASSAY OF LACTIC ACID: CPT | Performed by: PHYSICIAN ASSISTANT

## 2023-09-20 PROCEDURE — 71000033 HC RECOVERY, INTIAL HOUR: Performed by: STUDENT IN AN ORGANIZED HEALTH CARE EDUCATION/TRAINING PROGRAM

## 2023-09-20 PROCEDURE — 44143 PARTIAL REMOVAL OF COLON: CPT | Mod: ,,, | Performed by: STUDENT IN AN ORGANIZED HEALTH CARE EDUCATION/TRAINING PROGRAM

## 2023-09-20 PROCEDURE — 96365 THER/PROPH/DIAG IV INF INIT: CPT

## 2023-09-20 PROCEDURE — 99223 1ST HOSP IP/OBS HIGH 75: CPT | Mod: 57,S$GLB,, | Performed by: STUDENT IN AN ORGANIZED HEALTH CARE EDUCATION/TRAINING PROGRAM

## 2023-09-20 PROCEDURE — 99223 PR INITIAL HOSPITAL CARE,LEVL III: ICD-10-PCS | Mod: 57,S$GLB,, | Performed by: STUDENT IN AN ORGANIZED HEALTH CARE EDUCATION/TRAINING PROGRAM

## 2023-09-20 PROCEDURE — 63600175 PHARM REV CODE 636 W HCPCS: Performed by: NURSE PRACTITIONER

## 2023-09-20 PROCEDURE — 36000708 HC OR TIME LEV III 1ST 15 MIN: Performed by: STUDENT IN AN ORGANIZED HEALTH CARE EDUCATION/TRAINING PROGRAM

## 2023-09-20 PROCEDURE — 87040 BLOOD CULTURE FOR BACTERIA: CPT | Performed by: NURSE PRACTITIONER

## 2023-09-20 PROCEDURE — 44143 PR PART REMOVAL COLON W END COLOSTOMY: ICD-10-PCS | Mod: ,,, | Performed by: STUDENT IN AN ORGANIZED HEALTH CARE EDUCATION/TRAINING PROGRAM

## 2023-09-20 PROCEDURE — 25000003 PHARM REV CODE 250: Performed by: NURSE PRACTITIONER

## 2023-09-20 PROCEDURE — 63600175 PHARM REV CODE 636 W HCPCS: Performed by: PHYSICIAN ASSISTANT

## 2023-09-20 PROCEDURE — 96375 TX/PRO/DX INJ NEW DRUG ADDON: CPT

## 2023-09-20 PROCEDURE — D9220A PRA ANESTHESIA: ICD-10-PCS | Mod: CRNA,,, | Performed by: NURSE ANESTHETIST, CERTIFIED REGISTERED

## 2023-09-20 PROCEDURE — 20600001 HC STEP DOWN PRIVATE ROOM

## 2023-09-20 PROCEDURE — 25000003 PHARM REV CODE 250: Performed by: PHYSICIAN ASSISTANT

## 2023-09-20 PROCEDURE — 37000008 HC ANESTHESIA 1ST 15 MINUTES: Performed by: STUDENT IN AN ORGANIZED HEALTH CARE EDUCATION/TRAINING PROGRAM

## 2023-09-20 PROCEDURE — D9220A PRA ANESTHESIA: Mod: ANES,,, | Performed by: ANESTHESIOLOGY

## 2023-09-20 PROCEDURE — 99291 CRITICAL CARE FIRST HOUR: CPT

## 2023-09-20 PROCEDURE — 37000009 HC ANESTHESIA EA ADD 15 MINS: Performed by: STUDENT IN AN ORGANIZED HEALTH CARE EDUCATION/TRAINING PROGRAM

## 2023-09-20 PROCEDURE — 71000039 HC RECOVERY, EACH ADD'L HOUR: Performed by: STUDENT IN AN ORGANIZED HEALTH CARE EDUCATION/TRAINING PROGRAM

## 2023-09-20 PROCEDURE — 36415 COLL VENOUS BLD VENIPUNCTURE: CPT | Performed by: PHYSICIAN ASSISTANT

## 2023-09-20 PROCEDURE — 27201423 OPTIME MED/SURG SUP & DEVICES STERILE SUPPLY: Performed by: STUDENT IN AN ORGANIZED HEALTH CARE EDUCATION/TRAINING PROGRAM

## 2023-09-20 PROCEDURE — 63600175 PHARM REV CODE 636 W HCPCS: Performed by: NURSE ANESTHETIST, CERTIFIED REGISTERED

## 2023-09-20 PROCEDURE — 83690 ASSAY OF LIPASE: CPT | Performed by: PHYSICIAN ASSISTANT

## 2023-09-20 PROCEDURE — 25000003 PHARM REV CODE 250: Performed by: NURSE ANESTHETIST, CERTIFIED REGISTERED

## 2023-09-20 PROCEDURE — D9220A PRA ANESTHESIA: ICD-10-PCS | Mod: ANES,,, | Performed by: ANESTHESIOLOGY

## 2023-09-20 PROCEDURE — 36000709 HC OR TIME LEV III EA ADD 15 MIN: Performed by: STUDENT IN AN ORGANIZED HEALTH CARE EDUCATION/TRAINING PROGRAM

## 2023-09-20 RX ORDER — DULOXETIN HYDROCHLORIDE 30 MG/1
60 CAPSULE, DELAYED RELEASE ORAL 2 TIMES DAILY
Status: DISCONTINUED | OUTPATIENT
Start: 2023-09-21 | End: 2023-09-26 | Stop reason: HOSPADM

## 2023-09-20 RX ORDER — MAG HYDROX/ALUMINUM HYD/SIMETH 200-200-20
30 SUSPENSION, ORAL (FINAL DOSE FORM) ORAL 4 TIMES DAILY PRN
Status: DISCONTINUED | OUTPATIENT
Start: 2023-09-21 | End: 2023-09-26 | Stop reason: HOSPADM

## 2023-09-20 RX ORDER — HYDROMORPHONE HYDROCHLORIDE 2 MG/ML
1 INJECTION, SOLUTION INTRAMUSCULAR; INTRAVENOUS; SUBCUTANEOUS
Status: DISCONTINUED | OUTPATIENT
Start: 2023-09-21 | End: 2023-09-24

## 2023-09-20 RX ORDER — DEXMEDETOMIDINE HYDROCHLORIDE 100 UG/ML
INJECTION, SOLUTION INTRAVENOUS
Status: DISCONTINUED | OUTPATIENT
Start: 2023-09-20 | End: 2023-09-21

## 2023-09-20 RX ORDER — HYDROXYZINE PAMOATE 100 MG/1
100 CAPSULE ORAL ONCE
COMMUNITY

## 2023-09-20 RX ORDER — HYDROMORPHONE HYDROCHLORIDE 1 MG/ML
1 INJECTION, SOLUTION INTRAMUSCULAR; INTRAVENOUS; SUBCUTANEOUS ONCE
Status: DISCONTINUED | OUTPATIENT
Start: 2023-09-20 | End: 2023-09-20 | Stop reason: SDUPTHER

## 2023-09-20 RX ORDER — TRAZODONE HYDROCHLORIDE 50 MG/1
50 TABLET ORAL NIGHTLY PRN
Status: DISCONTINUED | OUTPATIENT
Start: 2023-09-21 | End: 2023-09-26 | Stop reason: HOSPADM

## 2023-09-20 RX ORDER — LANOLIN ALCOHOL/MO/W.PET/CERES
800 CREAM (GRAM) TOPICAL
Status: DISCONTINUED | OUTPATIENT
Start: 2023-09-21 | End: 2023-09-26 | Stop reason: HOSPADM

## 2023-09-20 RX ORDER — HYDROMORPHONE HYDROCHLORIDE 1 MG/ML
1 INJECTION, SOLUTION INTRAMUSCULAR; INTRAVENOUS; SUBCUTANEOUS ONCE
Status: DISCONTINUED | OUTPATIENT
Start: 2023-09-20 | End: 2023-09-20 | Stop reason: CLARIF

## 2023-09-20 RX ORDER — ACETAMINOPHEN 325 MG/1
650 TABLET ORAL EVERY 8 HOURS PRN
Status: DISCONTINUED | OUTPATIENT
Start: 2023-09-21 | End: 2023-09-26 | Stop reason: HOSPADM

## 2023-09-20 RX ORDER — PROPOFOL 10 MG/ML
VIAL (ML) INTRAVENOUS
Status: DISCONTINUED | OUTPATIENT
Start: 2023-09-20 | End: 2023-09-21

## 2023-09-20 RX ORDER — KETOROLAC TROMETHAMINE 30 MG/ML
30 INJECTION, SOLUTION INTRAMUSCULAR; INTRAVENOUS EVERY 8 HOURS
Status: COMPLETED | OUTPATIENT
Start: 2023-09-21 | End: 2023-09-23

## 2023-09-20 RX ORDER — IBUPROFEN 200 MG
16 TABLET ORAL
Status: DISCONTINUED | OUTPATIENT
Start: 2023-09-21 | End: 2023-09-26 | Stop reason: HOSPADM

## 2023-09-20 RX ORDER — AMLODIPINE BESYLATE 10 MG/1
10 TABLET ORAL DAILY
COMMUNITY

## 2023-09-20 RX ORDER — IBUPROFEN 200 MG
24 TABLET ORAL
Status: DISCONTINUED | OUTPATIENT
Start: 2023-09-21 | End: 2023-09-26 | Stop reason: HOSPADM

## 2023-09-20 RX ORDER — DEXAMETHASONE SODIUM PHOSPHATE 4 MG/ML
INJECTION, SOLUTION INTRA-ARTICULAR; INTRALESIONAL; INTRAMUSCULAR; INTRAVENOUS; SOFT TISSUE
Status: DISCONTINUED | OUTPATIENT
Start: 2023-09-20 | End: 2023-09-21

## 2023-09-20 RX ORDER — LIDOCAINE HYDROCHLORIDE 20 MG/ML
INJECTION INTRAVENOUS
Status: DISCONTINUED | OUTPATIENT
Start: 2023-09-20 | End: 2023-09-21

## 2023-09-20 RX ORDER — HYDROXYZINE PAMOATE 25 MG/1
100 CAPSULE ORAL ONCE
Status: DISCONTINUED | OUTPATIENT
Start: 2023-09-21 | End: 2023-09-23

## 2023-09-20 RX ORDER — HYDROMORPHONE HYDROCHLORIDE 2 MG/ML
1 INJECTION, SOLUTION INTRAMUSCULAR; INTRAVENOUS; SUBCUTANEOUS EVERY 4 HOURS PRN
Status: DISCONTINUED | OUTPATIENT
Start: 2023-09-21 | End: 2023-09-26 | Stop reason: HOSPADM

## 2023-09-20 RX ORDER — ROCURONIUM BROMIDE 10 MG/ML
INJECTION, SOLUTION INTRAVENOUS
Status: DISCONTINUED | OUTPATIENT
Start: 2023-09-20 | End: 2023-09-21

## 2023-09-20 RX ORDER — PANTOPRAZOLE SODIUM 40 MG/1
40 TABLET, DELAYED RELEASE ORAL DAILY
Status: DISCONTINUED | OUTPATIENT
Start: 2023-09-21 | End: 2023-09-26 | Stop reason: HOSPADM

## 2023-09-20 RX ORDER — ONDANSETRON 2 MG/ML
4 INJECTION INTRAMUSCULAR; INTRAVENOUS EVERY 6 HOURS PRN
Status: DISCONTINUED | OUTPATIENT
Start: 2023-09-21 | End: 2023-09-26 | Stop reason: HOSPADM

## 2023-09-20 RX ORDER — SUCCINYLCHOLINE CHLORIDE 20 MG/ML
INJECTION INTRAMUSCULAR; INTRAVENOUS
Status: DISCONTINUED | OUTPATIENT
Start: 2023-09-20 | End: 2023-09-21

## 2023-09-20 RX ORDER — HYDROMORPHONE HYDROCHLORIDE 1 MG/ML
1 INJECTION, SOLUTION INTRAMUSCULAR; INTRAVENOUS; SUBCUTANEOUS ONCE
Status: DISCONTINUED | OUTPATIENT
Start: 2023-09-20 | End: 2023-09-24

## 2023-09-20 RX ORDER — MORPHINE SULFATE 2 MG/ML
4 INJECTION, SOLUTION INTRAMUSCULAR; INTRAVENOUS EVERY 4 HOURS PRN
Status: DISCONTINUED | OUTPATIENT
Start: 2023-09-21 | End: 2023-09-23

## 2023-09-20 RX ORDER — SODIUM CHLORIDE 0.9 % (FLUSH) 0.9 %
10 SYRINGE (ML) INJECTION EVERY 12 HOURS PRN
Status: DISCONTINUED | OUTPATIENT
Start: 2023-09-21 | End: 2023-09-26 | Stop reason: HOSPADM

## 2023-09-20 RX ORDER — MORPHINE SULFATE 2 MG/ML
6 INJECTION, SOLUTION INTRAMUSCULAR; INTRAVENOUS
Status: COMPLETED | OUTPATIENT
Start: 2023-09-20 | End: 2023-09-20

## 2023-09-20 RX ORDER — OMEPRAZOLE 20 MG/1
20 CAPSULE, DELAYED RELEASE ORAL DAILY
Status: ON HOLD | COMMUNITY
Start: 2023-09-16 | End: 2024-02-08 | Stop reason: HOSPADM

## 2023-09-20 RX ORDER — NALOXONE HCL 0.4 MG/ML
0.02 VIAL (ML) INJECTION
Status: DISCONTINUED | OUTPATIENT
Start: 2023-09-21 | End: 2023-09-26 | Stop reason: HOSPADM

## 2023-09-20 RX ORDER — HYDROCODONE BITARTRATE AND ACETAMINOPHEN 5; 325 MG/1; MG/1
1 TABLET ORAL EVERY 6 HOURS PRN
Status: DISCONTINUED | OUTPATIENT
Start: 2023-09-21 | End: 2023-09-20

## 2023-09-20 RX ORDER — HYDROMORPHONE HYDROCHLORIDE 2 MG/ML
0.5 INJECTION, SOLUTION INTRAMUSCULAR; INTRAVENOUS; SUBCUTANEOUS
Status: DISCONTINUED | OUTPATIENT
Start: 2023-09-21 | End: 2023-09-24

## 2023-09-20 RX ORDER — ONDANSETRON 2 MG/ML
4 INJECTION INTRAMUSCULAR; INTRAVENOUS
Status: COMPLETED | OUTPATIENT
Start: 2023-09-20 | End: 2023-09-20

## 2023-09-20 RX ORDER — PALIPERIDONE 3 MG/1
3 TABLET, EXTENDED RELEASE ORAL NIGHTLY
Status: ON HOLD | COMMUNITY
Start: 2023-05-04 | End: 2024-02-08 | Stop reason: HOSPADM

## 2023-09-20 RX ORDER — SODIUM CHLORIDE, SODIUM LACTATE, POTASSIUM CHLORIDE, CALCIUM CHLORIDE 600; 310; 30; 20 MG/100ML; MG/100ML; MG/100ML; MG/100ML
INJECTION, SOLUTION INTRAVENOUS CONTINUOUS
Status: DISCONTINUED | OUTPATIENT
Start: 2023-09-21 | End: 2023-09-26 | Stop reason: HOSPADM

## 2023-09-20 RX ORDER — MIRTAZAPINE 15 MG/1
15 TABLET, FILM COATED ORAL NIGHTLY
Status: DISCONTINUED | OUTPATIENT
Start: 2023-09-21 | End: 2023-09-26 | Stop reason: HOSPADM

## 2023-09-20 RX ORDER — ACETAMINOPHEN 10 MG/ML
INJECTION, SOLUTION INTRAVENOUS
Status: DISCONTINUED | OUTPATIENT
Start: 2023-09-20 | End: 2023-09-21

## 2023-09-20 RX ORDER — RISPERIDONE 1 MG/1
2 TABLET ORAL NIGHTLY
Status: DISCONTINUED | OUTPATIENT
Start: 2023-09-21 | End: 2023-09-26 | Stop reason: HOSPADM

## 2023-09-20 RX ORDER — FENTANYL CITRATE 50 UG/ML
INJECTION, SOLUTION INTRAMUSCULAR; INTRAVENOUS
Status: DISCONTINUED | OUTPATIENT
Start: 2023-09-20 | End: 2023-09-21

## 2023-09-20 RX ORDER — MIDAZOLAM HYDROCHLORIDE 1 MG/ML
INJECTION INTRAMUSCULAR; INTRAVENOUS
Status: DISCONTINUED | OUTPATIENT
Start: 2023-09-20 | End: 2023-09-21

## 2023-09-20 RX ORDER — DIVALPROEX SODIUM 250 MG/1
250 TABLET, DELAYED RELEASE ORAL 3 TIMES DAILY
Status: DISCONTINUED | OUTPATIENT
Start: 2023-09-21 | End: 2023-09-26 | Stop reason: HOSPADM

## 2023-09-20 RX ORDER — ONDANSETRON HYDROCHLORIDE 2 MG/ML
INJECTION, SOLUTION INTRAMUSCULAR; INTRAVENOUS
Status: DISCONTINUED | OUTPATIENT
Start: 2023-09-20 | End: 2023-09-21

## 2023-09-20 RX ORDER — GLUCAGON 1 MG
1 KIT INJECTION
Status: DISCONTINUED | OUTPATIENT
Start: 2023-09-21 | End: 2023-09-26 | Stop reason: HOSPADM

## 2023-09-20 RX ORDER — MELOXICAM 7.5 MG/1
7.5 TABLET ORAL DAILY
Status: ON HOLD | COMMUNITY
Start: 2023-09-05 | End: 2024-02-08 | Stop reason: HOSPADM

## 2023-09-20 RX ADMIN — SODIUM CHLORIDE, SODIUM GLUCONATE, SODIUM ACETATE, POTASSIUM CHLORIDE AND MAGNESIUM CHLORIDE: 526; 502; 368; 37; 30 INJECTION, SOLUTION INTRAVENOUS at 08:09

## 2023-09-20 RX ADMIN — FENTANYL CITRATE 50 MCG: 50 INJECTION, SOLUTION INTRAMUSCULAR; INTRAVENOUS at 10:09

## 2023-09-20 RX ADMIN — MORPHINE SULFATE 6 MG: 2 INJECTION, SOLUTION INTRAMUSCULAR; INTRAVENOUS at 06:09

## 2023-09-20 RX ADMIN — DEXMEDETOMIDINE HYDROCHLORIDE 8 MCG: 100 INJECTION, SOLUTION INTRAVENOUS at 10:09

## 2023-09-20 RX ADMIN — ROCURONIUM BROMIDE 15 MG: 10 INJECTION, SOLUTION INTRAVENOUS at 11:09

## 2023-09-20 RX ADMIN — SUGAMMADEX 200 MG: 100 INJECTION, SOLUTION INTRAVENOUS at 11:09

## 2023-09-20 RX ADMIN — ROCURONIUM BROMIDE 30 MG: 10 INJECTION, SOLUTION INTRAVENOUS at 10:09

## 2023-09-20 RX ADMIN — MIDAZOLAM HYDROCHLORIDE 2 MG: 1 INJECTION, SOLUTION INTRAMUSCULAR; INTRAVENOUS at 09:09

## 2023-09-20 RX ADMIN — DEXMEDETOMIDINE HYDROCHLORIDE 8 MCG: 100 INJECTION, SOLUTION INTRAVENOUS at 11:09

## 2023-09-20 RX ADMIN — GLYCOPYRROLATE 0.1 MG: 0.2 INJECTION, SOLUTION INTRAMUSCULAR; INTRAVITREAL at 09:09

## 2023-09-20 RX ADMIN — MIDAZOLAM HYDROCHLORIDE 3 MG: 1 INJECTION, SOLUTION INTRAMUSCULAR; INTRAVENOUS at 09:09

## 2023-09-20 RX ADMIN — LIDOCAINE HYDROCHLORIDE 100 MG: 20 INJECTION, SOLUTION INTRAVENOUS at 11:09

## 2023-09-20 RX ADMIN — DEXAMETHASONE SODIUM PHOSPHATE 8 MG: 4 INJECTION, SOLUTION INTRA-ARTICULAR; INTRALESIONAL; INTRAMUSCULAR; INTRAVENOUS; SOFT TISSUE at 09:09

## 2023-09-20 RX ADMIN — SODIUM CHLORIDE 1000 ML: 0.9 INJECTION, SOLUTION INTRAVENOUS at 07:09

## 2023-09-20 RX ADMIN — FENTANYL CITRATE 50 MCG: 50 INJECTION, SOLUTION INTRAMUSCULAR; INTRAVENOUS at 09:09

## 2023-09-20 RX ADMIN — ROCURONIUM BROMIDE 10 MG: 10 INJECTION, SOLUTION INTRAVENOUS at 09:09

## 2023-09-20 RX ADMIN — ROCURONIUM BROMIDE 20 MG: 10 INJECTION, SOLUTION INTRAVENOUS at 10:09

## 2023-09-20 RX ADMIN — VANCOMYCIN HYDROCHLORIDE 2000 MG: 1 INJECTION, POWDER, LYOPHILIZED, FOR SOLUTION INTRAVENOUS at 08:09

## 2023-09-20 RX ADMIN — PIPERACILLIN AND TAZOBACTAM 4.5 G: 4; .5 INJECTION, POWDER, LYOPHILIZED, FOR SOLUTION INTRAVENOUS; PARENTERAL at 07:09

## 2023-09-20 RX ADMIN — ONDANSETRON 4 MG: 2 INJECTION INTRAMUSCULAR; INTRAVENOUS at 07:09

## 2023-09-20 RX ADMIN — SUCCINYLCHOLINE CHLORIDE 120 MG: 20 INJECTION, SOLUTION INTRAMUSCULAR; INTRAVENOUS at 09:09

## 2023-09-20 RX ADMIN — FENTANYL CITRATE 100 MCG: 50 INJECTION, SOLUTION INTRAMUSCULAR; INTRAVENOUS at 11:09

## 2023-09-20 RX ADMIN — ACETAMINOPHEN 1000 MG: 10 INJECTION, SOLUTION INTRAVENOUS at 09:09

## 2023-09-20 RX ADMIN — ROCURONIUM BROMIDE 40 MG: 10 INJECTION, SOLUTION INTRAVENOUS at 09:09

## 2023-09-20 RX ADMIN — ONDANSETRON 4 MG: 2 INJECTION INTRAMUSCULAR; INTRAVENOUS at 09:09

## 2023-09-20 RX ADMIN — LIDOCAINE HYDROCHLORIDE 75 MG: 20 INJECTION, SOLUTION INTRAVENOUS at 09:09

## 2023-09-20 RX ADMIN — SODIUM CHLORIDE, SODIUM GLUCONATE, SODIUM ACETATE, POTASSIUM CHLORIDE AND MAGNESIUM CHLORIDE: 526; 502; 368; 37; 30 INJECTION, SOLUTION INTRAVENOUS at 10:09

## 2023-09-20 RX ADMIN — PROPOFOL 200 MG: 10 INJECTION, EMULSION INTRAVENOUS at 09:09

## 2023-09-20 NOTE — FIRST PROVIDER EVALUATION
Emergency Department TeleTriage Encounter Note      CHIEF COMPLAINT    Chief Complaint   Patient presents with    Abdominal Pain     Abdominal pain lower right side that goes around to his back, states he stings when he urinates states this has been going on for a little over a week        VITAL SIGNS   Initial Vitals [09/20/23 1618]   BP Pulse Resp Temp SpO2   137/87 (!) 135 20 100.2 °F (37.9 °C) 97 %      MAP       --            ALLERGIES    Review of patient's allergies indicates:  No Known Allergies    PROVIDER TRIAGE NOTE  This is a teletriage evaluation of a 33 y.o. male presenting to the ED complaining of abdominal pain. Patient reports RLQ abdominal pain, nausea, and vomiting since 6am. Pain radiates into his back. Pain is getting worst. Took tylenol but vomited shortly after.    Patient is alert and oriented. He speaks in complete sentences. He is sitting upright in the chair in no distress.     Initial orders will be placed and care will be transferred to an alternate provider when patient is roomed for a full evaluation. Any additional orders and the final disposition will be determined by that provider.         ORDERS  Labs Reviewed   CBC W/ AUTO DIFFERENTIAL   COMPREHENSIVE METABOLIC PANEL   LIPASE   URINALYSIS, REFLEX TO URINE CULTURE       ED Orders (720h ago, onward)      Start Ordered     Status Ordering Provider    09/20/23 1630 09/20/23 1625  ondansetron injection 4 mg  ED 1 Time         Ordered DEB, CRISTY G.    09/20/23 1630 09/20/23 1625  sodium chloride 0.9% bolus 1,000 mL 1,000 mL  ED 1 Time         Ordered DEB, CRISTY G.    09/20/23 1626 09/20/23 1625  Vital signs  Every 2 hours         Ordered DEB, CRISTY G.    09/20/23 1625 09/20/23 1625  Diet NPO  Diet effective now         Ordered DEB, CRISTY G.    09/20/23 1625 09/20/23 1625  Insert peripheral IV  Once         Ordered DEB, CRISTY G.    09/20/23 1625 09/20/23 1625  CBC W/ AUTO DIFFERENTIAL  STAT         Pending Collection DEB,  CRISTY ELLIS.    09/20/23 1625 09/20/23 1625  Comp. Metabolic Panel  STAT         Pending Collection DEB, CRISTY ELLIS.    09/20/23 1625 09/20/23 1625  Lipase  STAT         Pending Collection DEB, CRISTY ELLIS.    09/20/23 1625 09/20/23 1625  Urinalysis, Reflex to Urine Culture Urine, Clean Catch  STAT         Ordered CRISTY BOYD.    09/20/23 1625 09/20/23 1625  CT Abdomen Pelvis With Contrast  1 time imaging         Ordered DEB CRSITY ELLISAntoine              Virtual Visit Note: The provider triage portion of this emergency department evaluation and documentation was performed via [employer], a HIPAA-compliant telemedicine application, in concert with a tele-presenter in the room. A face to face patient evaluation with one of my colleagues will occur once the patient is placed in an emergency department room.      DISCLAIMER: This note was prepared with [employer] voice recognition transcription software. Garbled syntax, mangled pronouns, and other bizarre constructions may be attributed to that software system.     I will STOP taking the medications listed below when I get home from the hospital:    norethindrone-ethinyl estradiol biphasic 1 mg-10 mcg oral tablet  -- 1 tab(s) by mouth once a day Patient

## 2023-09-21 LAB
ALBUMIN SERPL BCP-MCNC: 3.1 G/DL (ref 3.5–5.2)
ALP SERPL-CCNC: 41 U/L (ref 55–135)
ALT SERPL W/O P-5'-P-CCNC: 14 U/L (ref 10–44)
ANION GAP SERPL CALC-SCNC: 13 MMOL/L (ref 8–16)
AST SERPL-CCNC: 19 U/L (ref 10–40)
BASOPHILS NFR BLD: 0 % (ref 0–1.9)
BILIRUB SERPL-MCNC: 0.8 MG/DL (ref 0.1–1)
BUN SERPL-MCNC: 20 MG/DL (ref 6–20)
CALCIUM SERPL-MCNC: 7.8 MG/DL (ref 8.7–10.5)
CHLORIDE SERPL-SCNC: 100 MMOL/L (ref 95–110)
CO2 SERPL-SCNC: 20 MMOL/L (ref 23–29)
CREAT SERPL-MCNC: 1.3 MG/DL (ref 0.5–1.4)
DIFFERENTIAL METHOD: ABNORMAL
EOSINOPHIL NFR BLD: 0 % (ref 0–8)
ERYTHROCYTE [DISTWIDTH] IN BLOOD BY AUTOMATED COUNT: 12.7 % (ref 11.5–14.5)
EST. GFR  (NO RACE VARIABLE): >60 ML/MIN/1.73 M^2
GLUCOSE SERPL-MCNC: 147 MG/DL (ref 70–110)
HCT VFR BLD AUTO: 46.7 % (ref 40–54)
HGB BLD-MCNC: 16.1 G/DL (ref 14–18)
IMM GRANULOCYTES # BLD AUTO: ABNORMAL K/UL
IMM GRANULOCYTES NFR BLD AUTO: ABNORMAL %
LYMPHOCYTES NFR BLD: 10 % (ref 18–48)
MAGNESIUM SERPL-MCNC: 1.5 MG/DL (ref 1.6–2.6)
MCH RBC QN AUTO: 30.2 PG (ref 27–31)
MCHC RBC AUTO-ENTMCNC: 34.5 G/DL (ref 32–36)
MCV RBC AUTO: 88 FL (ref 82–98)
METAMYELOCYTES NFR BLD MANUAL: 2 %
MONOCYTES NFR BLD: 3 % (ref 4–15)
NEUTROPHILS NFR BLD: 72 % (ref 38–73)
NEUTS BAND NFR BLD MANUAL: 13 %
NRBC BLD-RTO: 0 /100 WBC
PLATELET # BLD AUTO: 363 K/UL (ref 150–450)
PLATELET BLD QL SMEAR: ABNORMAL
PMV BLD AUTO: 8.5 FL (ref 9.2–12.9)
POTASSIUM SERPL-SCNC: 5.1 MMOL/L (ref 3.5–5.1)
PROT SERPL-MCNC: 6.2 G/DL (ref 6–8.4)
RBC # BLD AUTO: 5.33 M/UL (ref 4.6–6.2)
SODIUM SERPL-SCNC: 133 MMOL/L (ref 136–145)
WBC # BLD AUTO: 15.73 K/UL (ref 3.9–12.7)

## 2023-09-21 PROCEDURE — 83735 ASSAY OF MAGNESIUM: CPT | Performed by: NURSE PRACTITIONER

## 2023-09-21 PROCEDURE — 63600175 PHARM REV CODE 636 W HCPCS: Performed by: STUDENT IN AN ORGANIZED HEALTH CARE EDUCATION/TRAINING PROGRAM

## 2023-09-21 PROCEDURE — 85027 COMPLETE CBC AUTOMATED: CPT | Performed by: NURSE PRACTITIONER

## 2023-09-21 PROCEDURE — 25000003 PHARM REV CODE 250: Performed by: ANESTHESIOLOGY

## 2023-09-21 PROCEDURE — 25000003 PHARM REV CODE 250: Performed by: NURSE PRACTITIONER

## 2023-09-21 PROCEDURE — 94761 N-INVAS EAR/PLS OXIMETRY MLT: CPT

## 2023-09-21 PROCEDURE — 85007 BL SMEAR W/DIFF WBC COUNT: CPT | Performed by: NURSE PRACTITIONER

## 2023-09-21 PROCEDURE — 94799 UNLISTED PULMONARY SVC/PX: CPT

## 2023-09-21 PROCEDURE — 63600175 PHARM REV CODE 636 W HCPCS: Performed by: NURSE PRACTITIONER

## 2023-09-21 PROCEDURE — 87070 CULTURE OTHR SPECIMN AEROBIC: CPT | Performed by: STUDENT IN AN ORGANIZED HEALTH CARE EDUCATION/TRAINING PROGRAM

## 2023-09-21 PROCEDURE — 63600175 PHARM REV CODE 636 W HCPCS: Performed by: HOSPITALIST

## 2023-09-21 PROCEDURE — 87116 MYCOBACTERIA CULTURE: CPT | Performed by: STUDENT IN AN ORGANIZED HEALTH CARE EDUCATION/TRAINING PROGRAM

## 2023-09-21 PROCEDURE — 20600001 HC STEP DOWN PRIVATE ROOM

## 2023-09-21 PROCEDURE — 87206 SMEAR FLUORESCENT/ACID STAI: CPT | Performed by: STUDENT IN AN ORGANIZED HEALTH CARE EDUCATION/TRAINING PROGRAM

## 2023-09-21 PROCEDURE — 87102 FUNGUS ISOLATION CULTURE: CPT | Performed by: STUDENT IN AN ORGANIZED HEALTH CARE EDUCATION/TRAINING PROGRAM

## 2023-09-21 PROCEDURE — 25000003 PHARM REV CODE 250: Performed by: HOSPITALIST

## 2023-09-21 PROCEDURE — 36415 COLL VENOUS BLD VENIPUNCTURE: CPT | Performed by: NURSE PRACTITIONER

## 2023-09-21 PROCEDURE — 81000 URINALYSIS NONAUTO W/SCOPE: CPT | Performed by: PHYSICIAN ASSISTANT

## 2023-09-21 PROCEDURE — 87075 CULTR BACTERIA EXCEPT BLOOD: CPT | Performed by: STUDENT IN AN ORGANIZED HEALTH CARE EDUCATION/TRAINING PROGRAM

## 2023-09-21 PROCEDURE — 27000221 HC OXYGEN, UP TO 24 HOURS

## 2023-09-21 PROCEDURE — 80053 COMPREHEN METABOLIC PANEL: CPT | Performed by: NURSE PRACTITIONER

## 2023-09-21 PROCEDURE — 63600175 PHARM REV CODE 636 W HCPCS: Performed by: ANESTHESIOLOGY

## 2023-09-21 PROCEDURE — 87205 SMEAR GRAM STAIN: CPT | Performed by: STUDENT IN AN ORGANIZED HEALTH CARE EDUCATION/TRAINING PROGRAM

## 2023-09-21 RX ORDER — MAGNESIUM SULFATE HEPTAHYDRATE 40 MG/ML
2 INJECTION, SOLUTION INTRAVENOUS ONCE
Status: COMPLETED | OUTPATIENT
Start: 2023-09-21 | End: 2023-09-21

## 2023-09-21 RX ORDER — HYDROMORPHONE HYDROCHLORIDE 2 MG/ML
0.2 INJECTION, SOLUTION INTRAMUSCULAR; INTRAVENOUS; SUBCUTANEOUS EVERY 5 MIN PRN
Status: DISCONTINUED | OUTPATIENT
Start: 2023-09-21 | End: 2023-09-21 | Stop reason: HOSPADM

## 2023-09-21 RX ORDER — MUPIROCIN 20 MG/G
OINTMENT TOPICAL 2 TIMES DAILY
Status: DISCONTINUED | OUTPATIENT
Start: 2023-09-21 | End: 2023-09-26 | Stop reason: HOSPADM

## 2023-09-21 RX ORDER — ACETAMINOPHEN 10 MG/ML
1000 INJECTION, SOLUTION INTRAVENOUS EVERY 8 HOURS
Status: COMPLETED | OUTPATIENT
Start: 2023-09-21 | End: 2023-09-21

## 2023-09-21 RX ORDER — HALOPERIDOL 5 MG/ML
0.5 INJECTION INTRAMUSCULAR EVERY 10 MIN PRN
Status: DISCONTINUED | OUTPATIENT
Start: 2023-09-21 | End: 2023-09-21 | Stop reason: HOSPADM

## 2023-09-21 RX ADMIN — HYDROMORPHONE HYDROCHLORIDE 1 MG: 2 INJECTION INTRAMUSCULAR; INTRAVENOUS; SUBCUTANEOUS at 11:09

## 2023-09-21 RX ADMIN — HYDROMORPHONE HYDROCHLORIDE 1 MG: 2 INJECTION INTRAMUSCULAR; INTRAVENOUS; SUBCUTANEOUS at 01:09

## 2023-09-21 RX ADMIN — DULOXETINE HYDROCHLORIDE 60 MG: 30 CAPSULE, DELAYED RELEASE ORAL at 09:09

## 2023-09-21 RX ADMIN — HYDROMORPHONE HYDROCHLORIDE 1 MG: 2 INJECTION INTRAMUSCULAR; INTRAVENOUS; SUBCUTANEOUS at 12:09

## 2023-09-21 RX ADMIN — HYDROMORPHONE HYDROCHLORIDE 0.2 MG: 2 INJECTION, SOLUTION INTRAMUSCULAR; INTRAVENOUS; SUBCUTANEOUS at 01:09

## 2023-09-21 RX ADMIN — HYDROMORPHONE HYDROCHLORIDE 1 MG: 2 INJECTION INTRAMUSCULAR; INTRAVENOUS; SUBCUTANEOUS at 06:09

## 2023-09-21 RX ADMIN — HYDROMORPHONE HYDROCHLORIDE 1 MG: 2 INJECTION INTRAMUSCULAR; INTRAVENOUS; SUBCUTANEOUS at 08:09

## 2023-09-21 RX ADMIN — HYDROMORPHONE HYDROCHLORIDE 0.2 MG: 2 INJECTION, SOLUTION INTRAMUSCULAR; INTRAVENOUS; SUBCUTANEOUS at 12:09

## 2023-09-21 RX ADMIN — ACETAMINOPHEN 1000 MG: 10 INJECTION INTRAVENOUS at 05:09

## 2023-09-21 RX ADMIN — KETOROLAC TROMETHAMINE 30 MG: 30 INJECTION, SOLUTION INTRAMUSCULAR at 01:09

## 2023-09-21 RX ADMIN — ACETAMINOPHEN 1000 MG: 10 INJECTION INTRAVENOUS at 09:09

## 2023-09-21 RX ADMIN — SODIUM CHLORIDE, POTASSIUM CHLORIDE, SODIUM LACTATE AND CALCIUM CHLORIDE: 600; 310; 30; 20 INJECTION, SOLUTION INTRAVENOUS at 01:09

## 2023-09-21 RX ADMIN — ACETAMINOPHEN 1000 MG: 10 INJECTION INTRAVENOUS at 01:09

## 2023-09-21 RX ADMIN — DIVALPROEX SODIUM 250 MG: 250 TABLET, DELAYED RELEASE ORAL at 09:09

## 2023-09-21 RX ADMIN — SODIUM CHLORIDE, POTASSIUM CHLORIDE, SODIUM LACTATE AND CALCIUM CHLORIDE: 600; 310; 30; 20 INJECTION, SOLUTION INTRAVENOUS at 07:09

## 2023-09-21 RX ADMIN — HYDROMORPHONE HYDROCHLORIDE 1 MG: 2 INJECTION INTRAMUSCULAR; INTRAVENOUS; SUBCUTANEOUS at 04:09

## 2023-09-21 RX ADMIN — MORPHINE SULFATE 4 MG: 2 INJECTION, SOLUTION INTRAMUSCULAR; INTRAVENOUS at 04:09

## 2023-09-21 RX ADMIN — RISPERIDONE 2 MG: 1 TABLET ORAL at 09:09

## 2023-09-21 RX ADMIN — MAGNESIUM SULFATE 2 G: 2 INJECTION INTRAVENOUS at 10:09

## 2023-09-21 RX ADMIN — KETOROLAC TROMETHAMINE 30 MG: 30 INJECTION, SOLUTION INTRAMUSCULAR at 09:09

## 2023-09-21 RX ADMIN — PIPERACILLIN AND TAZOBACTAM 4.5 G: 4; .5 INJECTION, POWDER, LYOPHILIZED, FOR SOLUTION INTRAVENOUS; PARENTERAL at 09:09

## 2023-09-21 RX ADMIN — MUPIROCIN: 20 OINTMENT TOPICAL at 09:09

## 2023-09-21 RX ADMIN — PIPERACILLIN AND TAZOBACTAM 4.5 G: 4; .5 INJECTION, POWDER, LYOPHILIZED, FOR SOLUTION INTRAVENOUS; PARENTERAL at 01:09

## 2023-09-21 RX ADMIN — KETOROLAC TROMETHAMINE 30 MG: 30 INJECTION, SOLUTION INTRAMUSCULAR at 05:09

## 2023-09-21 RX ADMIN — SODIUM CHLORIDE, SODIUM GLUCONATE, SODIUM ACETATE, POTASSIUM CHLORIDE AND MAGNESIUM CHLORIDE: 526; 502; 368; 37; 30 INJECTION, SOLUTION INTRAVENOUS at 12:09

## 2023-09-21 RX ADMIN — ONDANSETRON 4 MG: 2 INJECTION INTRAMUSCULAR; INTRAVENOUS at 07:09

## 2023-09-21 RX ADMIN — MIRTAZAPINE 15 MG: 15 TABLET, FILM COATED ORAL at 09:09

## 2023-09-21 NOTE — H&P
"[facility]  History & Physical    Patient Name: Brayden Francois Jr.  MRN: [id number]  Patient Class: IP- Inpatient  Admission Date: 9/20/2023  Attending Physician: Dr. Mackenzie  Primary Care Provider: Ibis Rolle NP         Patient information was obtained from patient, past medical records and ER records.     Subjective:     Principal Problem:Perforated diverticulum    Chief Complaint:   Chief Complaint   Patient presents with    Abdominal Pain     Abdominal pain lower right side that goes around to his back, states he stings when he urinates states this has been going on for a little over a week         HPI: Mr. Francois is a 33 year old male with a history of polysubstance abuse and amphetamine induced psychosis who presents today with complaints of right sided abd pain. It is severe. Pain radiates to his back and has been present for about a week. Symptoms were acutely worse this morning and associated with N/V and fever. He denies chest pain, SOB, dizziness, or LOC. Pt was seen briefly on the way to the OR and was AAOx4. In the ED, he is febrile at 100.2, tachycardic 135, normotensive. WBC 25K and CT abd/pelvis reveals: "Finding compatible with perforated diverticulitis of the sigmoid colon, with several small fluid collections in the lower abdomen and pelvis as above. A neoplastic process or inflammatory bowel disease are not excluded". Findings discussed with Dr. Pascual who plans to take to the OR now for repair. Hospital medicine is consulted for admission.      Past Medical History:   Diagnosis Date    Psychoactive substance-induced psychosis        Past Surgical History:   Procedure Laterality Date    APPENDECTOMY      FRACTURE SURGERY      TONSILLECTOMY         Review of patient's allergies indicates:  No Known Allergies    No current facility-administered medications on file prior to encounter.     Current Outpatient Medications on File Prior to Encounter "   Medication Sig    amLODIPine (NORVASC) 10 MG tablet Take 10 mg by mouth once daily.    divalproex (DEPAKOTE) 250 MG EC tablet Take 1 tablet (250 mg total) by mouth 3 (three) times daily.    DULoxetine (CYMBALTA) 60 MG capsule Take 1 capsule (60 mg total) by mouth once daily. (Patient taking differently: Take 60 mg by mouth 2 (two) times daily.)    hydrOXYzine (VISTARIL) 100 MG capsule Take 100 mg by mouth once.    mirtazapine (REMERON) 15 MG tablet Take 1 tablet (15 mg total) by mouth every evening.    risperiDONE (RISPERDAL) 2 MG tablet Take 1 tablet (2 mg total) by mouth every evening.    traZODone (DESYREL) 50 MG tablet Take 1 tablet (50 mg total) by mouth nightly as needed for Insomnia.    meloxicam (MOBIC) 7.5 MG tablet Take 7.5 mg by mouth once daily.    omeprazole (PRILOSEC) 20 MG capsule Take 20 mg by mouth once daily.    paliperidone (INVEGA) 3 MG TR24 Take 3 mg by mouth every evening.     Family History       Problem Relation (Age of Onset)    Breast cancer Maternal Grandfather, Other          Tobacco Use    Smoking status: Every Day     Current packs/day: 1.00     Average packs/day: 1 pack/day for 12.0 years (12.0 ttl pk-yrs)     Types: Cigarettes    Smokeless tobacco: Former   Substance and Sexual Activity    Alcohol use: No    Drug use: No    Sexual activity: Not on file     Review of Systems   Constitutional:  Positive for chills, diaphoresis, fatigue and fever.   HENT:  Negative for congestion, ear pain, sore throat and trouble swallowing.    Eyes:  Negative for pain, discharge and visual disturbance.   Respiratory:  Negative for cough, chest tightness, shortness of breath and wheezing.    Cardiovascular:  Negative for chest pain, palpitations and leg swelling.   Gastrointestinal:  Positive for abdominal pain, nausea and vomiting. Negative for abdominal distention, blood in stool, constipation and diarrhea.   Endocrine: Negative for polydipsia, polyphagia and polyuria.    Genitourinary:  Negative for dysuria, flank pain, frequency and urgency.   Musculoskeletal:  Negative for back pain, joint swelling, neck pain and neck stiffness.   Skin:  Negative for rash and wound.   Allergic/Immunologic: Negative for immunocompromised state.   Neurological:  Negative for dizziness, syncope, speech difficulty, weakness, light-headedness, numbness and headaches.   Hematological:  Negative for adenopathy.   Psychiatric/Behavioral:  Negative for confusion and suicidal ideas. The patient is not nervous/anxious.    All other systems reviewed and are negative.    Objective:     Vital Signs (Most Recent):  Temp: (!) 102.7 °F (39.3 °C) (09/20/23 2048)  Pulse: (!) 118 (09/20/23 2048)  Resp: 20 (09/20/23 2048)  BP: (!) 141/64 (09/20/23 2048)  SpO2: (!) 92 % (09/20/23 2048) Vital Signs (24h Range):  Temp:  [100.2 °F (37.9 °C)-102.7 °F (39.3 °C)] 102.7 °F (39.3 °C)  Pulse:  [114-135] 118  Resp:  [20] 20  SpO2:  [92 %-97 %] 92 %  BP: (137-141)/(64-87) 141/64     Weight: 103.4 kg (228 lb)  Body mass index is 33.67 kg/m².     Physical Exam  Vitals and nursing note reviewed.   Constitutional:       Appearance: He is well-developed.   HENT:      Head: Normocephalic and atraumatic.   Eyes:      Conjunctiva/sclera: Conjunctivae normal.      Pupils: Pupils are equal, round, and reactive to light.   Cardiovascular:      Rate and Rhythm: Normal rate and regular rhythm.   Pulmonary:      Effort: Pulmonary effort is normal.   Abdominal:      Tenderness: There is abdominal tenderness.   Musculoskeletal:         General: Normal range of motion.      Cervical back: Normal range of motion and neck supple.   Skin:     General: Skin is warm and dry.      Capillary Refill: Capillary refill takes less than 2 seconds.   Neurological:      Mental Status: He is alert and oriented to person, place, and time.   Psychiatric:         Behavior: Behavior normal.         Thought Content: Thought content normal.         Judgment:  Judgment normal.              CRANIAL NERVES     CN III, IV, VI   Pupils are equal, round, and reactive to light.       Significant Labs: All pertinent labs within the past 24 hours have been reviewed.  CBC:   Recent Labs   Lab 09/20/23  1638   WBC 25.71*   HGB 15.7   HCT 47.6        CMP:   Recent Labs   Lab 09/20/23  1638      K 4.2      CO2 20*   *   BUN 18   CREATININE 1.1   CALCIUM 9.9   PROT 8.3   ALBUMIN 4.4   BILITOT 0.8   ALKPHOS 68   AST 19   ALT 15   ANIONGAP 16     Lactic Acid:   Recent Labs   Lab 09/20/23  1638   LACTATE 2.0       Significant Imaging: I have reviewed all pertinent imaging results/findings within the past 24 hours.    X-Ray Chest AP Portable    Result Date: 9/20/2023  EXAMINATION: XR CHEST AP PORTABLE CLINICAL HISTORY: Perforation of intestine (nontraumatic) TECHNIQUE: Single frontal view of the chest was performed. COMPARISON: None FINDINGS: There is mild bibasilar atelectasis.  The lungs are otherwise clear.  The lungs are hypo expanded.  The pleural spaces are clear.  The cardiac silhouette is unremarkable.  Osseous structures are intact     Bibasilar atelectasis. Electronically signed by: Skip Bay Date:    09/20/2023 Time:    19:52    CT Abdomen Pelvis With Contrast    Result Date: 9/20/2023  EXAMINATION: CT ABDOMEN PELVIS WITH CONTRAST CLINICAL HISTORY: RLQ abdominal pain (Age >= 14y); TECHNIQUE: Axial CT images with sagittal and coronal reformats were obtained of the abdomen and pelvis from the hemidiaphragms through the symphysis pubis after the administration of 100mL Omnipaque 350. COMPARISON: None available. FINDINGS: Lung Bases: Atelectasis or scarring noted in the lung bases. Heart: Heart size is normal.  No pericardial effusion. Liver: The liver is normal in size and demonstrates homogeneous enhancement without focal lesion.  The portal vasculature is patent. Biliary tract: No intrahepatic or extrahepatic biliary ductal dilatation. Gallbladder:  No radiodense gallstone. No wall thickening or pericholecystic fluid. Pancreas: Normal. No pancreatic ductal dilatation. Spleen: Normal size without focal lesion. Adrenals: Unremarkable. Kidneys and urinary collecting systems: Normal.  No hydronephrosis or urolithiasis. Lymph nodes: None enlarged. Stomach and bowel: The stomach is moderately distended.  There is diffuse wall thickening of the jejunal loops.  There are multiple diverticula of the distal descending and sigmoid colon, with wall thickening of the sigmoid colon and fat stranding and soft tissue thickening in the adjacent mesentery.  There is fluid distention of the distal esophagus with a small hiatal hernia. The appendix is not seen, likely surgically absent. Peritoneum and mesentery: There is mild volume of scattered free intraperitoneal air, compatible with perforated viscus.  There are several small fluid and air collections in the midline lower abdomen and the pelvis, the largest of which measures approximately 2.5 x 2.2 cm (series 2, image 143).  Additional small collection in the lower abdominal mesentery measures approximately 1.9 x 1.7 cm (series 2, image 17). Vasculature: No aneurysm or significant atherosclerosis. Urinary bladder: There is reactive wall thickening of the urinary bladder. Reproductive organs: Normal. Body wall: No abnormality. Musculoskeletal: No aggressive osseous lesion.     1. Finding compatible with perforated diverticulitis of the sigmoid colon, with several small fluid collections in the lower abdomen and pelvis as above. A neoplastic process or inflammatory bowel disease are not excluded. Further evaluation with colonoscopy and tissue sampling is recommend. 2. Mild diffuse wall thickening of the jejunum which may be reactive, enteritis not excluded. 3. Small hiatal hernia with fluid distention of the distal esophagus. This report was flagged in Epic as abnormal. Dr. Bay discussed critical findings with TERE Hermosillo by  telephone at 19:36 on 09/20/2023. Electronically signed by: Skip Bay Date:    09/20/2023 Time:    19:40       Assessment/Plan:     * Perforated diverticulum  Admit to step down  Consult Dr. Pascual - taking to OR now  Post op care as per his orders  Pain control   IV antiemetics  IV hydration   Continue zosyn      Severe sepsis  This patient does have evidence of infective focus  My overall impression is sepsis.  Source: Abdominal  Antibiotics given-   Antibiotics (72h ago, onward)    None        Latest lactate reviewed-  Recent Labs   Lab 09/20/23  1638   LACTATE 2.0     Organ dysfunction indicated by n/a    Fluid challenge Not needed - patient is not hypotensive      Post- resuscitation assessment No - Post resuscitation assessment not needed       Will Not start Pressors- Levophed for MAP of 65  Source control achieved by: surgical intervention and abx    Substance abuse with history of psychosis  Continue home psych medications         VTE Risk Mitigation (From admission, onward)    None                     Fabiola Zimmerman NP  Department of Hospital Medicine  [facility]

## 2023-09-21 NOTE — ASSESSMENT & PLAN NOTE
Admit to step down  Status post sigmoid colectomy with colostomy creation with Dr. Pascual  Post op care as per his orders  Pain control   IV antiemetics  IV hydration   Continue zosyn

## 2023-09-21 NOTE — ED NOTES
Report to Lynne RN, OR staff. Unable to give Vanco and dilaudid d/t availability, Pharmacy aware. Staff to obtain Vanco on the way to OR. Mother with belongings at bedside.

## 2023-09-21 NOTE — CONSULTS
[facility]  General Surgery  Consult Note    Inpatient consult to General Surgery  Consult performed by: Homer Pascual MD  Consult ordered by: Crissy Sharpe NP        Subjective:     Chief Complaint/Reason for Admission:  Pneumoperitoneum, likely perforated diverticulitis    History of Present Illness:  This is a 33-year-old male who presented with abdominal pain that started this morning that was severe.  In the ER, he had a CT scan that showed free air concerning for perforated diverticulitis.  Patient is a current smoker.  History of appendectomy.  Medical problems are notable for psychiatric issues.    No current facility-administered medications on file prior to encounter.     Current Outpatient Medications on File Prior to Encounter   Medication Sig    amLODIPine (NORVASC) 10 MG tablet Take 10 mg by mouth once daily.    divalproex (DEPAKOTE) 250 MG EC tablet Take 1 tablet (250 mg total) by mouth 3 (three) times daily.    DULoxetine (CYMBALTA) 60 MG capsule Take 1 capsule (60 mg total) by mouth once daily. (Patient taking differently: Take 60 mg by mouth 2 (two) times daily.)    hydrOXYzine (VISTARIL) 100 MG capsule Take 100 mg by mouth once.    mirtazapine (REMERON) 15 MG tablet Take 1 tablet (15 mg total) by mouth every evening.    risperiDONE (RISPERDAL) 2 MG tablet Take 1 tablet (2 mg total) by mouth every evening.    traZODone (DESYREL) 50 MG tablet Take 1 tablet (50 mg total) by mouth nightly as needed for Insomnia.       Review of patient's allergies indicates:  No Known Allergies    History reviewed. No pertinent past medical history.  Past Surgical History:   Procedure Laterality Date    APPENDECTOMY      FRACTURE SURGERY      TONSILLECTOMY       Family History       Problem Relation (Age of Onset)    Breast cancer Maternal Grandfather, Other          Tobacco Use    Smoking status: Every Day     Current packs/day: 1.00     Average packs/day: 1 pack/day for 12.0 years  (12.0 ttl pk-yrs)     Types: Cigarettes    Smokeless tobacco: Former   Substance and Sexual Activity    Alcohol use: No    Drug use: No    Sexual activity: Not on file     Review of Systems   Constitutional: Negative.  Negative for fatigue and fever.   HENT: Negative.     Eyes: Negative.    Respiratory: Negative.  Negative for shortness of breath.    Cardiovascular: Negative.  Negative for chest pain.   Gastrointestinal:  Positive for abdominal pain and nausea.   Endocrine: Negative.    Genitourinary: Negative.    Musculoskeletal: Negative.    Skin: Negative.    Allergic/Immunologic: Negative.    Neurological: Negative.    Hematological: Negative.    Psychiatric/Behavioral: Negative.       Objective:     Vital Signs (Most Recent):  Temp: 100.2 °F (37.9 °C) (09/20/23 1618)  Pulse: (!) 114 (09/20/23 1948)  Resp: 20 (09/20/23 1858)  BP: (!) 140/85 (09/20/23 1948)  SpO2: 97 % (09/20/23 1948) Vital Signs (24h Range):  Temp:  [100.2 °F (37.9 °C)] 100.2 °F (37.9 °C)  Pulse:  [114-135] 114  Resp:  [20] 20  SpO2:  [97 %] 97 %  BP: (137-140)/(85-87) 140/85     Weight: 103.4 kg (228 lb)  Body mass index is 33.67 kg/m².    No intake or output data in the 24 hours ending 09/20/23 2053    Physical Exam  Constitutional:       General: He is not in acute distress.     Appearance: Normal appearance. He is not ill-appearing, toxic-appearing or diaphoretic.   HENT:      Head: Normocephalic.      Nose: Nose normal.   Eyes:      Conjunctiva/sclera: Conjunctivae normal.   Cardiovascular:      Rate and Rhythm: Normal rate and regular rhythm.   Pulmonary:      Effort: Pulmonary effort is normal.   Abdominal:      Tenderness: There is abdominal tenderness.      Comments: Peritonitis   Musculoskeletal:         General: Normal range of motion.      Cervical back: Normal range of motion.   Skin:     General: Skin is warm.   Neurological:      General: No focal deficit present.      Mental Status: He is alert.   Psychiatric:         Mood and  "Affect: Mood normal.         Significant Labs:  CBC:   Recent Labs   Lab 09/20/23  1638   WBC 25.71*   RBC 5.50   HGB 15.7   HCT 47.6      MCV 87   MCH 28.5   MCHC 33.0     CMP:   Recent Labs   Lab 09/20/23  1638   *   CALCIUM 9.9   ALBUMIN 4.4   PROT 8.3      K 4.2   CO2 20*      BUN 18   CREATININE 1.1   ALKPHOS 68   ALT 15   AST 19   BILITOT 0.8     Coagulation: No results for input(s): "PT", "INR", "APTT" in the last 48 hours.  Lactic Acid:   Recent Labs   Lab 09/20/23  1638   LACTATE 2.0       Significant Diagnostics:  CT scan was reviewed.  There is free air.  The sigmoid colon appears inflamed consistent with likely perforated diverticulitis    Assessment/Plan:     33-year-old male with pneumoperitoneum likely from perforated diverticulitis.      Risks versus benefits of laparotomy, likely sigmoid resection, possible ostomy creation were discussed.  Consent was obtained.  Patient received antibiotics in the ER.    To OR for surgery.    Thank you for your consult. I will follow-up with patient. Please contact us if you have any additional questions.    Homer Pascual MD  General Surgery  [facility]  "

## 2023-09-21 NOTE — OP NOTE
[facility]  Surgery Department  Operative Note    SUMMARY     Date of Procedure: 9/20/2023     Procedure: Procedure(s) (LRB):  LAPAROTOMY, EXPLORATORY (N/A)   Sigmoid colectomy   Creation of end colostomy  Washout abdomen    Surgeon(s) and Role:     * Homer Pascual MD - Primary    Assisting Surgeon: None    Pre-Operative Diagnosis: Perforation bowel [K63.1]    Post-Operative Diagnosis: Post-Op Diagnosis Codes:     * Perforation bowel [K63.1]    Anesthesia: General    Operative Findings (including complications, if any):  Perforated sigmoid colon.  Sigmoid colectomy.  Creation of end colostomy.    Description of Technical Procedures:  This is a 33-year-old male who presented with pneumoperitoneum likely related to perforated diverticulitis of the sigmoid colon.  I recommended exploration and possible ostomy creation.  Patient did consent to the planned procedure.  He was taken to the OR, placed supine, general endotracheal anesthesia was induced, the abdomen was prepped and draped in the usual fashion, and a time-out was completed.  A generous midline laparotomy was made sharply.  Deep tissues were divided using electrocautery down to midline fascia.  This was incised in the abdomen was opened.  Immediately upon entry to the abdomen, there was a large amount feculent and purulent fluid.  This was evacuated.  The omentum was stuck down in the pelvis.  This was bluntly dissected free off the abdominal wall and adherent small bowel.  With the omentum free, was able to palpate a large inflamed mass in the vicinity of the sigmoid colon.  I was able to eviscerate the sigmoid colon noting a large perforation.  At the distal aspect of the descending colon, the colon was pliable.  A blue staple load was used to divide the colon at this location.  The mesentery moving distally was divided using a LigaSure device until the proximal rectum was encountered.  A blue contour staple load was used to  divide the proximal rectum and the specimen was passed off as sigmoid colon.  Next, the white line of Toldt was incised using electrocautery moving proximally along the descending colon to medialized the colon.  Given the amount of contamination, patient being febrile and tachycardic, and the tension at the anticipated anastomotic site, I instead elected to perform an end colostomy.  A circular incision was made in the left lower quadrant skin.  The subcutaneous fat was removed under this location until the abdominal wall was encountered.  A trephination was created in the abdominal wall using electrocautery.  The distal aspect of the descending colon at our planned end colostomy was defatted to facilitate delivery of the colon through the anticipated ostomy site.  The abdomen was then copiously irrigated with multiple L of warm saline.  There was abscess rind along multiple loops of small bowel and all interloop abscesses were washed out.  An NG-tube was placed and confirmed to be in appropriate location using palpation of the stomach.  Midline fascia was then closed using looped PDS sutures.  Skin was closed with staples.  A brooked end colostomy was then fashioned in the left lower quadrant using multiple interrupted Vicryl sutures.  Dressings and an ostomy appliance were placed.  Patient tolerated the entire procedure without apparent complication, was extubated in the OR, brought to recovery in stable condition.  All counts were correct.    Significant Surgical Tasks Conducted by the Assistant(s), if Applicable: n/a    Estimated Blood Loss (EBL): 200 mL           Implants: * No implants in log *    Specimens:   Specimen (24h ago, onward)       Start     Ordered    09/20/23 7638  Specimen to Pathology - Surgery  Once        Comments: Pre-op Diagnosis: Perforation bowel [K63.1]Procedure(s):LAPAROTOMY, EXPLORATORY Number of specimens: 1Name of specimens: 1. SIGMOID COLON     Question:  Release to patient  Answer:   Immediate    09/20/23 3915                            Condition: Stable    Disposition: PACU - hemodynamically stable.    Attestation: I was present and scrubbed for the entire procedure.

## 2023-09-21 NOTE — TRANSFER OF CARE
"Anesthesia Transfer of Care Note    Patient: Brayden Francois Jr.    Procedure(s) Performed: Procedure(s) (LRB):  LAPAROTOMY, EXPLORATORY (N/A)    Patient location: PACU    Anesthesia Type: general    Transport from OR: Transported from OR on 2-3 L/min O2 by NC with adequate spontaneous ventilation    Post pain: adequate analgesia    Post assessment: no apparent anesthetic complications and tolerated procedure well    Post vital signs: stable    Level of consciousness: sedated and responds to stimulation    Nausea/Vomiting: no nausea/vomiting    Complications: none    Transfer of care protocol was followed      Last vitals:   Visit Vitals  BP (!) 141/64 (BP Location: Left arm, Patient Position: Lying)   Pulse (!) 118   Temp (!) 39.3 °C (102.7 °F) (Temporal)   Resp 20   Ht 5' 9" (1.753 m)   Wt 103.4 kg (228 lb)   SpO2 (!) 92%   BMI 33.67 kg/m²     "

## 2023-09-21 NOTE — ANESTHESIA PREPROCEDURE EVALUATION
09/20/2023  Brayden Francois Jr. is a 33 y.o., male.      Pre-op Assessment    I have reviewed the Patient Summary Reports.     I have reviewed the Nursing Notes. I have reviewed the NPO Status.   I have reviewed the Medications.     Review of Systems  Anesthesia Hx:  Denies Family Hx of Anesthesia complications.   Denies Personal Hx of Anesthesia complications.   Social:  Smoker    Hepatic/GI:   Perforated sigmoid colon   Endocrine:  Obesity / BMI > 30  Psych:   Psychiatric History          Physical Exam  General: Cooperative, Alert, Oriented and Anxious    Airway:  Mallampati: III / II  Mouth Opening: Normal  TM Distance: Normal  Tongue: Normal  Neck ROM: Normal ROM  Neck: Girth Increased    Chest/Lungs:  Tachypnea    Heart:  Rate: Tachycardia  Rhythm: Regular Rhythm        Anesthesia Plan  Type of Anesthesia, risks & benefits discussed:    Anesthesia Type: Gen ETT  Intra-op Monitoring Plan: Standard ASA Monitors  Post Op Pain Control Plan: multimodal analgesia  Induction:  IV and rapid sequence  Airway Plan: Video, Post-Induction  Informed Consent: Informed consent signed with the Patient and all parties understand the risks and agree with anesthesia plan.  All questions answered.   ASA Score: 2 Emergent    Ready For Surgery From Anesthesia Perspective.     .

## 2023-09-21 NOTE — SUBJECTIVE & OBJECTIVE
Past Medical History:   Diagnosis Date    Psychoactive substance-induced psychosis        Past Surgical History:   Procedure Laterality Date    APPENDECTOMY      FRACTURE SURGERY      TONSILLECTOMY         Review of patient's allergies indicates:  No Known Allergies    No current facility-administered medications on file prior to encounter.     Current Outpatient Medications on File Prior to Encounter   Medication Sig    amLODIPine (NORVASC) 10 MG tablet Take 10 mg by mouth once daily.    divalproex (DEPAKOTE) 250 MG EC tablet Take 1 tablet (250 mg total) by mouth 3 (three) times daily.    DULoxetine (CYMBALTA) 60 MG capsule Take 1 capsule (60 mg total) by mouth once daily. (Patient taking differently: Take 60 mg by mouth 2 (two) times daily.)    hydrOXYzine (VISTARIL) 100 MG capsule Take 100 mg by mouth once.    mirtazapine (REMERON) 15 MG tablet Take 1 tablet (15 mg total) by mouth every evening.    risperiDONE (RISPERDAL) 2 MG tablet Take 1 tablet (2 mg total) by mouth every evening.    traZODone (DESYREL) 50 MG tablet Take 1 tablet (50 mg total) by mouth nightly as needed for Insomnia.    meloxicam (MOBIC) 7.5 MG tablet Take 7.5 mg by mouth once daily.    omeprazole (PRILOSEC) 20 MG capsule Take 20 mg by mouth once daily.    paliperidone (INVEGA) 3 MG TR24 Take 3 mg by mouth every evening.     Family History       Problem Relation (Age of Onset)    Breast cancer Maternal Grandfather, Other          Tobacco Use    Smoking status: Every Day     Current packs/day: 1.00     Average packs/day: 1 pack/day for 12.0 years (12.0 ttl pk-yrs)     Types: Cigarettes    Smokeless tobacco: Former   Substance and Sexual Activity    Alcohol use: No    Drug use: No    Sexual activity: Not on file     Review of Systems   Constitutional:  Positive for chills, diaphoresis, fatigue and fever.   HENT:  Negative for congestion, ear pain, sore throat and trouble swallowing.    Eyes:  Negative for pain, discharge and visual disturbance.    Respiratory:  Negative for cough, chest tightness, shortness of breath and wheezing.    Cardiovascular:  Negative for chest pain, palpitations and leg swelling.   Gastrointestinal:  Positive for abdominal pain, nausea and vomiting. Negative for abdominal distention, blood in stool, constipation and diarrhea.   Endocrine: Negative for polydipsia, polyphagia and polyuria.   Genitourinary:  Negative for dysuria, flank pain, frequency and urgency.   Musculoskeletal:  Negative for back pain, joint swelling, neck pain and neck stiffness.   Skin:  Negative for rash and wound.   Allergic/Immunologic: Negative for immunocompromised state.   Neurological:  Negative for dizziness, syncope, speech difficulty, weakness, light-headedness, numbness and headaches.   Hematological:  Negative for adenopathy.   Psychiatric/Behavioral:  Negative for confusion and suicidal ideas. The patient is not nervous/anxious.    All other systems reviewed and are negative.    Objective:     Vital Signs (Most Recent):  Temp: (!) 102.7 °F (39.3 °C) (09/20/23 2048)  Pulse: (!) 118 (09/20/23 2048)  Resp: 20 (09/20/23 2048)  BP: (!) 141/64 (09/20/23 2048)  SpO2: (!) 92 % (09/20/23 2048) Vital Signs (24h Range):  Temp:  [100.2 °F (37.9 °C)-102.7 °F (39.3 °C)] 102.7 °F (39.3 °C)  Pulse:  [114-135] 118  Resp:  [20] 20  SpO2:  [92 %-97 %] 92 %  BP: (137-141)/(64-87) 141/64     Weight: 103.4 kg (228 lb)  Body mass index is 33.67 kg/m².     Physical Exam  Vitals and nursing note reviewed.   Constitutional:       Appearance: He is well-developed.   HENT:      Head: Normocephalic and atraumatic.   Eyes:      Conjunctiva/sclera: Conjunctivae normal.      Pupils: Pupils are equal, round, and reactive to light.   Cardiovascular:      Rate and Rhythm: Normal rate and regular rhythm.   Pulmonary:      Effort: Pulmonary effort is normal.   Abdominal:      Tenderness: There is abdominal tenderness.   Musculoskeletal:         General: Normal range of motion.       Cervical back: Normal range of motion and neck supple.   Skin:     General: Skin is warm and dry.      Capillary Refill: Capillary refill takes less than 2 seconds.   Neurological:      Mental Status: He is alert and oriented to person, place, and time.   Psychiatric:         Behavior: Behavior normal.         Thought Content: Thought content normal.         Judgment: Judgment normal.              CRANIAL NERVES     CN III, IV, VI   Pupils are equal, round, and reactive to light.       Significant Labs: All pertinent labs within the past 24 hours have been reviewed.  CBC:   Recent Labs   Lab 09/20/23  1638   WBC 25.71*   HGB 15.7   HCT 47.6        CMP:   Recent Labs   Lab 09/20/23  1638      K 4.2      CO2 20*   *   BUN 18   CREATININE 1.1   CALCIUM 9.9   PROT 8.3   ALBUMIN 4.4   BILITOT 0.8   ALKPHOS 68   AST 19   ALT 15   ANIONGAP 16     Lactic Acid:   Recent Labs   Lab 09/20/23  1638   LACTATE 2.0       Significant Imaging: I have reviewed all pertinent imaging results/findings within the past 24 hours.    X-Ray Chest AP Portable    Result Date: 9/20/2023  EXAMINATION: XR CHEST AP PORTABLE CLINICAL HISTORY: Perforation of intestine (nontraumatic) TECHNIQUE: Single frontal view of the chest was performed. COMPARISON: None FINDINGS: There is mild bibasilar atelectasis.  The lungs are otherwise clear.  The lungs are hypo expanded.  The pleural spaces are clear.  The cardiac silhouette is unremarkable.  Osseous structures are intact     Bibasilar atelectasis. Electronically signed by: Skip Bay Date:    09/20/2023 Time:    19:52    CT Abdomen Pelvis With Contrast    Result Date: 9/20/2023  EXAMINATION: CT ABDOMEN PELVIS WITH CONTRAST CLINICAL HISTORY: RLQ abdominal pain (Age >= 14y); TECHNIQUE: Axial CT images with sagittal and coronal reformats were obtained of the abdomen and pelvis from the hemidiaphragms through the symphysis pubis after the administration of 100mL Omnipaque 350.  COMPARISON: None available. FINDINGS: Lung Bases: Atelectasis or scarring noted in the lung bases. Heart: Heart size is normal.  No pericardial effusion. Liver: The liver is normal in size and demonstrates homogeneous enhancement without focal lesion.  The portal vasculature is patent. Biliary tract: No intrahepatic or extrahepatic biliary ductal dilatation. Gallbladder: No radiodense gallstone. No wall thickening or pericholecystic fluid. Pancreas: Normal. No pancreatic ductal dilatation. Spleen: Normal size without focal lesion. Adrenals: Unremarkable. Kidneys and urinary collecting systems: Normal.  No hydronephrosis or urolithiasis. Lymph nodes: None enlarged. Stomach and bowel: The stomach is moderately distended.  There is diffuse wall thickening of the jejunal loops.  There are multiple diverticula of the distal descending and sigmoid colon, with wall thickening of the sigmoid colon and fat stranding and soft tissue thickening in the adjacent mesentery.  There is fluid distention of the distal esophagus with a small hiatal hernia. The appendix is not seen, likely surgically absent. Peritoneum and mesentery: There is mild volume of scattered free intraperitoneal air, compatible with perforated viscus.  There are several small fluid and air collections in the midline lower abdomen and the pelvis, the largest of which measures approximately 2.5 x 2.2 cm (series 2, image 143).  Additional small collection in the lower abdominal mesentery measures approximately 1.9 x 1.7 cm (series 2, image 17). Vasculature: No aneurysm or significant atherosclerosis. Urinary bladder: There is reactive wall thickening of the urinary bladder. Reproductive organs: Normal. Body wall: No abnormality. Musculoskeletal: No aggressive osseous lesion.     1. Finding compatible with perforated diverticulitis of the sigmoid colon, with several small fluid collections in the lower abdomen and pelvis as above. A neoplastic process or  inflammatory bowel disease are not excluded. Further evaluation with colonoscopy and tissue sampling is recommend. 2. Mild diffuse wall thickening of the jejunum which may be reactive, enteritis not excluded. 3. Small hiatal hernia with fluid distention of the distal esophagus. This report was flagged in Epic as abnormal. Dr. Bay discussed critical findings with TERE Hermosillo by telephone at 19:36 on 09/20/2023. Electronically signed by: Skip Bay Date:    09/20/2023 Time:    19:40

## 2023-09-21 NOTE — SUBJECTIVE & OBJECTIVE
Interval History:  Patient seen and examined.  Reports pain is a 7/10.  Denies nausea.  NG tube in place.  Underwent sigmoid colectomy with colostomy placement yesterday.    Review of Systems   Gastrointestinal:  Positive for abdominal pain. Negative for nausea and vomiting.     Objective:     Vital Signs (Most Recent):  Temp: 97.3 °F (36.3 °C) (09/21/23 0900)  Pulse: 95 (09/21/23 1100)  Resp: 20 (09/21/23 1230)  BP: 118/74 (09/21/23 1100)  SpO2: (!) 94 % (09/21/23 1100) Vital Signs (24h Range):  Temp:  [97.3 °F (36.3 °C)-102.7 °F (39.3 °C)] 97.3 °F (36.3 °C)  Pulse:  [] 95  Resp:  [13-25] 20  SpO2:  [92 %-97 %] 94 %  BP: (118-175)/() 118/74     Weight: 105.1 kg (231 lb 11.3 oz)  Body mass index is 34.22 kg/m².    Intake/Output Summary (Last 24 hours) at 9/21/2023 1245  Last data filed at 9/21/2023 0626  Gross per 24 hour   Intake 2600 ml   Output 1500 ml   Net 1100 ml         Physical Exam  Constitutional:       General: He is not in acute distress.     Appearance: He is well-developed. He is ill-appearing.   HENT:      Head: Normocephalic and atraumatic.   Eyes:      Pupils: Pupils are equal, round, and reactive to light.   Cardiovascular:      Rate and Rhythm: Normal rate and regular rhythm.      Heart sounds: No murmur heard.  Pulmonary:      Effort: Pulmonary effort is normal. No respiratory distress.      Breath sounds: Normal breath sounds. No wheezing or rales.   Abdominal:      General: There is no distension.      Palpations: Abdomen is soft.      Tenderness: There is abdominal tenderness (Diffuse, left side greater than right).      Comments: Ostomy   Musculoskeletal:         General: Normal range of motion.   Skin:     General: Skin is warm and dry.      Findings: No rash.      Comments: Multiple tattoos   Neurological:      Mental Status: He is alert and oriented to person, place, and time.      Cranial Nerves: No cranial nerve deficit.   Psychiatric:         Behavior: Behavior normal.              Significant Labs: All pertinent labs within the past 24 hours have been reviewed.    Significant Imaging: I have reviewed all pertinent imaging results/findings within the past 24 hours.

## 2023-09-21 NOTE — PLAN OF CARE
Problem: Adult Inpatient Plan of Care  Goal: Plan of Care Review  Outcome: Ongoing, Progressing  Goal: Patient-Specific Goal (Individualized)  Outcome: Ongoing, Progressing  Goal: Absence of Hospital-Acquired Illness or Injury  Outcome: Ongoing, Progressing  Goal: Optimal Comfort and Wellbeing  Outcome: Ongoing, Progressing  Goal: Readiness for Transition of Care  Outcome: Ongoing, Progressing     Problem: Infection  Goal: Absence of Infection Signs and Symptoms  Outcome: Ongoing, Progressing     Problem: Adjustment to Illness (Sepsis/Septic Shock)  Goal: Optimal Coping  Outcome: Ongoing, Progressing     Problem: Bleeding (Sepsis/Septic Shock)  Goal: Absence of Bleeding  Outcome: Ongoing, Progressing     Problem: Glycemic Control Impaired (Sepsis/Septic Shock)  Goal: Blood Glucose Level Within Desired Range  Outcome: Ongoing, Progressing     Problem: Infection Progression (Sepsis/Septic Shock)  Goal: Absence of Infection Signs and Symptoms  Outcome: Ongoing, Progressing     Problem: Nutrition Impaired (Sepsis/Septic Shock)  Goal: Optimal Nutrition Intake  Outcome: Ongoing, Progressing     Problem: Fall Injury Risk  Goal: Absence of Fall and Fall-Related Injury  Outcome: Ongoing, Progressing

## 2023-09-21 NOTE — HPI
"Mr. Francois is a 33 year old male with a history of polysubstance abuse and amphetamine induced psychosis who presents today with complaints of right sided abd pain. It is severe. Pain radiates to his back and has been present for about a week. Symptoms were acutely worse this morning and associated with N/V and fever. He denies chest pain, SOB, dizziness, or LOC. Pt was seen briefly on the way to the OR and was AAOx4. In the ED, he is febrile at 100.2, tachycardic 135, normotensive. WBC 25K and CT abd/pelvis reveals: "Finding compatible with perforated diverticulitis of the sigmoid colon, with several small fluid collections in the lower abdomen and pelvis as above. A neoplastic process or inflammatory bowel disease are not excluded". Findings discussed with Dr. Pascual who plans to take to the OR now for repair. Hospital medicine is consulted for admission.  "

## 2023-09-21 NOTE — EICU
Intervention Initiated From:  [facility] / EICU    Miguel A intervened regarding:  Rounding (Video assessment)    Nurse Notified:  Yes    Doctor Notified:  No    Comments: Rounding done on new admit from PACU. Patient resting on nasal cannula. B/P 154/97, , resp 20, sat 96. Side rails up x4.

## 2023-09-21 NOTE — PROGRESS NOTES
"[facility]  Progress Note    Patient Name: Brayden Francois Jr.  MRN: [id number]  Patient Class: IP- Inpatient   Admission Date: 9/20/2023  Length of Stay: 1 days  Attending Physician: Roya Mackenzie MD  Primary Care Provider: Ibis Rolle NP        Subjective:     Principal Problem:Perforated diverticulum        HPI:  Mr. Francois is a 33 year old male with a history of polysubstance abuse and amphetamine induced psychosis who presents today with complaints of right sided abd pain. It is severe. Pain radiates to his back and has been present for about a week. Symptoms were acutely worse this morning and associated with N/V and fever. He denies chest pain, SOB, dizziness, or LOC. Pt was seen briefly on the way to the OR and was AAOx4. In the ED, he is febrile at 100.2, tachycardic 135, normotensive. WBC 25K and CT abd/pelvis reveals: "Finding compatible with perforated diverticulitis of the sigmoid colon, with several small fluid collections in the lower abdomen and pelvis as above. A neoplastic process or inflammatory bowel disease are not excluded". Findings discussed with Dr. Pascual who plans to take to the OR now for repair. Hospital medicine is consulted for admission.      Overview/Hospital Course:  No notes on file    Interval History:  Patient seen and examined.  Reports pain is a 7/10.  Denies nausea.  NG tube in place.  Underwent sigmoid colectomy with colostomy placement yesterday.    Review of Systems   Gastrointestinal:  Positive for abdominal pain. Negative for nausea and vomiting.     Objective:     Vital Signs (Most Recent):  Temp: 97.3 °F (36.3 °C) (09/21/23 0900)  Pulse: 95 (09/21/23 1100)  Resp: 20 (09/21/23 1230)  BP: 118/74 (09/21/23 1100)  SpO2: (!) 94 % (09/21/23 1100) Vital Signs (24h Range):  Temp:  [97.3 °F (36.3 °C)-102.7 °F (39.3 °C)] 97.3 °F (36.3 °C)  Pulse:  [] 95  Resp:  [13-25] 20  SpO2:  [92 %-97 %] 94 %  BP: (118-175)/() " 118/74     Weight: 105.1 kg (231 lb 11.3 oz)  Body mass index is 34.22 kg/m².    Intake/Output Summary (Last 24 hours) at 9/21/2023 1245  Last data filed at 9/21/2023 0626  Gross per 24 hour   Intake 2600 ml   Output 1500 ml   Net 1100 ml         Physical Exam  Constitutional:       General: He is not in acute distress.     Appearance: He is well-developed. He is ill-appearing.   HENT:      Head: Normocephalic and atraumatic.   Eyes:      Pupils: Pupils are equal, round, and reactive to light.   Cardiovascular:      Rate and Rhythm: Normal rate and regular rhythm.      Heart sounds: No murmur heard.  Pulmonary:      Effort: Pulmonary effort is normal. No respiratory distress.      Breath sounds: Normal breath sounds. No wheezing or rales.   Abdominal:      General: There is no distension.      Palpations: Abdomen is soft.      Tenderness: There is abdominal tenderness (Diffuse, left side greater than right).      Comments: Ostomy   Musculoskeletal:         General: Normal range of motion.   Skin:     General: Skin is warm and dry.      Findings: No rash.      Comments: Multiple tattoos   Neurological:      Mental Status: He is alert and oriented to person, place, and time.      Cranial Nerves: No cranial nerve deficit.   Psychiatric:         Behavior: Behavior normal.             Significant Labs: All pertinent labs within the past 24 hours have been reviewed.    Significant Imaging: I have reviewed all pertinent imaging results/findings within the past 24 hours.      Assessment/Plan:      * Perforated diverticulum  Admit to step down  Status post sigmoid colectomy with colostomy creation with Dr. Pascual  Post op care as per his orders  Pain control   IV antiemetics  IV hydration   Continue zosyn      Severe sepsis  This patient does have evidence of infective focus  My overall impression is sepsis.  Source: Abdominal  Antibiotics given-   Antibiotics (72h ago, onward)    None        Latest lactate reviewed-  Recent  Labs   Lab 09/20/23  1638   LACTATE 2.0     Organ dysfunction indicated by n/a    Fluid challenge Not needed - patient is not hypotensive      Post- resuscitation assessment No - Post resuscitation assessment not needed       Will Not start Pressors- Levophed for MAP of 65  Source control achieved by: surgical intervention and abx    Substance abuse with history of psychosis  Continue home psych medications         VTE Risk Mitigation (From admission, onward)         Ordered     IP VTE HIGH RISK PATIENT  Once         09/20/23 2303     Place sequential compression device  Until discontinued         09/20/23 2303                Discharge Planning   YOLANDA:      Code Status: Full Code   Is the patient medically ready for discharge?:     Reason for patient still in hospital (select all that apply): Patient trending condition and Treatment  Discharge Plan A: Home with family                  Roya Mackenzie MD  Department of Hospital Medicine   [facility]/Surg

## 2023-09-21 NOTE — PLAN OF CARE
Problem: Adult Inpatient Plan of Care  Goal: Plan of Care Review  Outcome: Ongoing, Progressing     Problem: Adult Inpatient Plan of Care  Goal: Optimal Comfort and Wellbeing  Outcome: Ongoing, Progressing     Problem: Infection  Goal: Absence of Infection Signs and Symptoms  Outcome: Ongoing, Progressing     Problem: Infection Progression (Sepsis/Septic Shock)  Goal: Absence of Infection Signs and Symptoms  Outcome: Ongoing, Progressing

## 2023-09-21 NOTE — ASSESSMENT & PLAN NOTE
Admit to step down  Consult Dr. Pascual - taking to OR now  Post op care as per his orders  Pain control   IV antiemetics  IV hydration   Continue zosyn

## 2023-09-21 NOTE — PROGRESS NOTES
Patient seen and examined.  No significant changes postoperatively.      Vitals are stabilizing   Abdomen appropriately tender   Ostomy is dusky    Labs reviewed.  Leukocytosis has decreased slightly     No significant changes from surgical perspective.  Await ostomy function prior to NG tube removal  Okay for sips and chips for comfort   Antibiotics   Following

## 2023-09-21 NOTE — EICU
Intervention Initiated From:  [facility] / EICU    Miguel A intervened regarding:  Rounding (Video assessment)    Comments: video rounds completed.  VS:  104, 137/82, 20, 96% nasal cannula.  IVF @ 125 cc/hr

## 2023-09-21 NOTE — ASSESSMENT & PLAN NOTE
This patient does have evidence of infective focus  My overall impression is sepsis.  Source: Abdominal  Antibiotics given-   Antibiotics (72h ago, onward)    None        Latest lactate reviewed-  Recent Labs   Lab 09/20/23  1638   LACTATE 2.0     Organ dysfunction indicated by n/a    Fluid challenge Not needed - patient is not hypotensive      Post- resuscitation assessment No - Post resuscitation assessment not needed       Will Not start Pressors- Levophed for MAP of 65  Source control achieved by: surgical intervention and abx

## 2023-09-21 NOTE — ANESTHESIA PROCEDURE NOTES
Intubation    Date/Time: 9/20/2023 9:40 PM    Performed by: Rei Johnson CRNA  Authorized by: Brody Hoff MD    Intubation:     Induction:  Rapid sequence induction    Intubated:  Postinduction    Mask Ventilation:  Not attempted    Attempts:  1    Attempted By:  CRNA    Method of Intubation:  Video laryngoscopy    Blade:  Kaba 3    Laryngeal View Grade: Grade I - full view of cords      Difficult Airway Encountered?: No      Complications:  None    Airway Device:  Oral endotracheal tube    Airway Device Size:  7.5    Style/Cuff Inflation:  Cuffed (inflated to minimal occlusive pressure)    Tube secured:  23    Secured at:  The lips    Placement Verified By:  Capnometry    Complicating Factors:  None    Findings Post-Intubation:  BS equal bilateral and atraumatic/condition of teeth unchanged

## 2023-09-21 NOTE — PLAN OF CARE
Patient brought to preop with mom at side, emergent surgery scheduled. Plan of care reviewed and warm blanket provided

## 2023-09-21 NOTE — PLAN OF CARE
[facility] [place] - Med/Surg  Initial Discharge Assessment       Primary Care Provider: Ibis Rolle NP    Admission Diagnosis: Perforated diverticulum [K57.80]    Admission Date: 9/20/2023  Expected Discharge Date:     Assessment completed with pts Mom Hernán Abernathy [phone] at bedside. She confirmed home address and that pt resides with her and his father Brayden Abernathy Sr [phone]. Pt is independent in ADL's and does not drive. She does not want to use PCP and [facility] anymore and would like to be connected to an [facility] PCP. Pt has [facility] medicaid . Pt uses [facility] or [facility] on [address] depending on discharge time. PTs mother will provide transport. CM following for additional needs.     Transition of Care Barriers: None    Payor: MEDICAID / Plan: [facility] COMMUNITY PLAN [facility] [employer] ([place] MEDICA[place] / Product Type: Managed Medicaid /     Extended Emergency Contact Information  Primary Emergency Contact: HERNÁN ABERNATHY  Mobile Phone: [phone]  Relation: Mother  Preferred language: English   needed? No  Secondary Emergency Contact: Brayden Last  Mobile Phone: [phone]  Relation: Father   needed? No    Discharge Plan A: Home with family  Discharge Plan B: Home    No Pharmacies Listed    Initial Assessment (most recent)       Adult Discharge Assessment - 09/21/23 0958          Discharge Assessment    Assessment Type Discharge Planning Assessment     Confirmed/corrected address, phone number and insurance Yes     Confirmed Demographics Correct on Facesheet     Source of Information patient;family     Communicated YOLANDA with patient/caregiver Yes     Reason For Admission Perforated diverticulum     People in Home parent(s)     Facility Arrived From: Home     Do you expect to return to your current living situation? Yes     Do you have help at home or someone to help you manage your care at home? Yes     Who are your caregiver(s) and their phone  number(s)? Chelo Francois [phone]     Prior to hospitilization cognitive status: Alert/Oriented     Current cognitive status: Alert/Oriented     Home Layout Able to live on 1st floor     Equipment Currently Used at Home none     Readmission within 30 days? No     Patient currently being followed by outpatient case management? No     Do you currently have service(s) that help you manage your care at home? No     Do you take prescription medications? Yes     Do you have prescription coverage? Yes     Do you have any problems affording any of your prescribed medications? No     Is the patient taking medications as prescribed? yes     Who is going to help you get home at discharge? Chelo Francois [phone]     How do you get to doctors appointments? family or friend will provide     Are you on dialysis? No     Do you take coumadin? No     DME Needed Upon Discharge  none     Discharge Plan discussed with: Patient;Parent(s)     Name(s) and Number(s) Chelo Francois [phone]     Transition of Care Barriers None     Discharge Plan A Home with family     Discharge Plan B Home        Physical Activity    On average, how many days per week do you engage in moderate to strenuous exercise (like a brisk walk)? Patient refused     On average, how many minutes do you engage in exercise at this level? Patient refused        Financial Resource Strain    How hard is it for you to pay for the very basics like food, housing, medical care, and heating? Patient refused        Housing Stability    In the last 12 months, was there a time when you were not able to pay the mortgage or rent on time? No     In the last 12 months, was there a time when you did not have a steady place to sleep or slept in a shelter (including now)? No        Transportation Needs    In the past 12 months, has lack of transportation kept you from medical appointments or from getting medications? No     In the past 12 months, has lack of  transportation kept you from meetings, work, or from getting things needed for daily living? No        Food Insecurity    Within the past 12 months, you worried that your food would run out before you got the money to buy more. Never true     Within the past 12 months, the food you bought just didn't last and you didn't have money to get more. Never true        Stress    Do you feel stress - tense, restless, nervous, or anxious, or unable to sleep at night because your mind is troubled all the time - these days? Patient refused        Social Connections    In a typical week, how many times do you talk on the phone with family, friends, or neighbors? Patient refused     How often do you get together with friends or relatives? Patient refused     How often do you attend [employer] or [employer] services? Patient refused     Do you belong to any clubs or organizations such as Baptist groups, unions, fraternal or athletic groups, or school groups? Patient refused     How often do you attend meetings of the clubs or organizations you belong to? Patient refused     Are you , , , , never , or living with a partner? Patient refused        Alcohol Use    Q1: How often do you have a drink containing alcohol? Patient refused     Q2: How many drinks containing alcohol do you have on a typical day when you are drinking? Patient refused     Q3: How often do you have six or more drinks on one occasion? Patient refused        OTHER    Name(s) of People in Home Mom Sade Francois [phone]

## 2023-09-21 NOTE — ED PROVIDER NOTES
CT of head negative for acute infarct or hemorrhage  Restart Plavix when tolerating diet  Encounter Date: 9/20/2023       History     Chief Complaint   Patient presents with    Abdominal Pain     Abdominal pain lower right side that goes around to his back, states he stings when he urinates states this has been going on for a little over a week      Patient is a 33 y.o. male who presents to the ED 09/20/2023 with a chief complaint of abdominal pain that started at 0600. He states it started in his lower abdomen and then became more diffuse and into his flanks. He has had dysuria and vomiting today as well. He denies any diarrhea. He states he felt fine yesterday. He has a history of bipolar disorder and takes Depakote and risperidone and something to help him sleep at night.  Denies any IV drug or alcohol abuse.  He was recently incarcerated for 7 years and was released within the last year.             Review of patient's allergies indicates:  No Known Allergies  History reviewed. No pertinent past medical history.  Past Surgical History:   Procedure Laterality Date    APPENDECTOMY      FRACTURE SURGERY      TONSILLECTOMY       Family History   Problem Relation Age of Onset    Breast cancer Maternal Grandfather     Breast cancer Other      Social History     Tobacco Use    Smoking status: Every Day     Current packs/day: 1.00     Average packs/day: 1 pack/day for 12.0 years (12.0 ttl pk-yrs)     Types: Cigarettes    Smokeless tobacco: Former   Substance Use Topics    Alcohol use: No    Drug use: No     Review of Systems   Constitutional:  Negative for chills and fever.   HENT:  Negative for sore throat.    Respiratory:  Negative for chest tightness and shortness of breath.    Cardiovascular:  Negative for chest pain.   Gastrointestinal:  Positive for abdominal pain, nausea and vomiting. Negative for constipation and diarrhea.   Genitourinary:  Negative for dysuria.   Musculoskeletal:  Negative for arthralgias and myalgias.   Skin:  Negative for rash and wound.   Neurological:  Negative for syncope.    Hematological:  Does not bruise/bleed easily.       Physical Exam     Initial Vitals [09/20/23 1618]   BP Pulse Resp Temp SpO2   137/87 (!) 135 20 100.2 °F (37.9 °C) 97 %      MAP       --         Physical Exam    Nursing note and vitals reviewed.  Constitutional: He appears well-developed and well-nourished.   HENT:   Head: Normocephalic and atraumatic.   Eyes: Conjunctivae are normal. Pupils are equal, round, and reactive to light. Right eye exhibits no discharge. Left eye exhibits no discharge.   Neck: Neck supple.   Normal range of motion.  Cardiovascular:  Normal rate, regular rhythm, normal heart sounds and intact distal pulses.           Pulmonary/Chest: Breath sounds normal.   Abdominal: Abdomen is soft. Bowel sounds are normal. There is generalized abdominal tenderness. No hernia.   No right CVA tenderness.  No left CVA tenderness. There is guarding.   Musculoskeletal:         General: Normal range of motion.      Cervical back: Normal range of motion and neck supple.     Neurological: He is alert and oriented to person, place, and time. He has normal strength. No sensory deficit.   Skin: Skin is warm and dry.   Psychiatric: He has a normal mood and affect.         ED Course   Critical Care    Date/Time: 9/20/2023 7:55 PM    Performed by: Crissy Sharpe NP  Authorized by: Umair Burks MD  Direct patient critical care time: 20 minutes  Additional history critical care time: 10 minutes  Ordering / reviewing critical care time: 11 minutes  Documentation critical care time: 10 minutes  Consulting other physicians critical care time: 8 minutes  Total critical care time (exclusive of procedural time) : 59 minutes  Critical care was time spent personally by me on the following activities: development of treatment plan with patient or surrogate, examination of patient, obtaining history from patient or surrogate, ordering and performing treatments and interventions, ordering and review of laboratory  studies and ordering and review of radiographic studies.        Labs Reviewed   CBC W/ AUTO DIFFERENTIAL - Abnormal; Notable for the following components:       Result Value    WBC 25.71 (*)     MPV 8.3 (*)     Gran % 85.0 (*)     Lymph % 6.0 (*)     All other components within normal limits   COMPREHENSIVE METABOLIC PANEL - Abnormal; Notable for the following components:    CO2 20 (*)     Glucose 124 (*)     All other components within normal limits   CULTURE, BLOOD   CULTURE, BLOOD   LIPASE   LACTIC ACID, PLASMA   URINALYSIS, REFLEX TO URINE CULTURE          Imaging Results              X-Ray Chest AP Portable (Final result)  Result time 09/20/23 19:52:55      Final result by Skip Bay DO (09/20/23 19:52:55)                   Impression:      Bibasilar atelectasis.      Electronically signed by: Skip Bay  Date:    09/20/2023  Time:    19:52               Narrative:    EXAMINATION:  XR CHEST AP PORTABLE    CLINICAL HISTORY:  Perforation of intestine (nontraumatic)    TECHNIQUE:  Single frontal view of the chest was performed.    COMPARISON:  None    FINDINGS:  There is mild bibasilar atelectasis.  The lungs are otherwise clear.  The lungs are hypo expanded.  The pleural spaces are clear.  The cardiac silhouette is unremarkable.  Osseous structures are intact                                        CT Abdomen Pelvis With Contrast (Final result)  Result time 09/20/23 19:40:42      Final result by Skip Bay DO (09/20/23 19:40:42)                   Impression:      1. Finding compatible with perforated diverticulitis of the sigmoid colon, with several small fluid collections in the lower abdomen and pelvis as above. A neoplastic process or inflammatory bowel disease are not excluded. Further evaluation with colonoscopy and tissue sampling is recommend.  2. Mild diffuse wall thickening of the jejunum which may be reactive, enteritis not excluded.  3. Small hiatal hernia with fluid distention of the  distal esophagus.  This report was flagged in Epic as abnormal.    Dr. Bay discussed critical findings with TERE Hermosillo by telephone at 19:36 on 09/20/2023.      Electronically signed by: Skip Bay  Date:    09/20/2023  Time:    19:40               Narrative:    EXAMINATION:  CT ABDOMEN PELVIS WITH CONTRAST    CLINICAL HISTORY:  RLQ abdominal pain (Age >= 14y);    TECHNIQUE:  Axial CT images with sagittal and coronal reformats were obtained of the abdomen and pelvis from the hemidiaphragms through the symphysis pubis after the administration of 100mL Omnipaque 350.    COMPARISON:  None available.    FINDINGS:  Lung Bases: Atelectasis or scarring noted in the lung bases.    Heart: Heart size is normal.  No pericardial effusion.    Liver: The liver is normal in size and demonstrates homogeneous enhancement without focal lesion.  The portal vasculature is patent.    Biliary tract: No intrahepatic or extrahepatic biliary ductal dilatation.    Gallbladder: No radiodense gallstone. No wall thickening or pericholecystic fluid.    Pancreas: Normal. No pancreatic ductal dilatation.    Spleen: Normal size without focal lesion.    Adrenals: Unremarkable.    Kidneys and urinary collecting systems: Normal.  No hydronephrosis or urolithiasis.    Lymph nodes: None enlarged.    Stomach and bowel: The stomach is moderately distended.  There is diffuse wall thickening of the jejunal loops.  There are multiple diverticula of the distal descending and sigmoid colon, with wall thickening of the sigmoid colon and fat stranding and soft tissue thickening in the adjacent mesentery.  There is fluid distention of the distal esophagus with a small hiatal hernia. The appendix is not seen, likely surgically absent.    Peritoneum and mesentery: There is mild volume of scattered free intraperitoneal air, compatible with perforated viscus.  There are several small fluid and air collections in the midline lower abdomen and the pelvis, the  largest of which measures approximately 2.5 x 2.2 cm (series 2, image 143).  Additional small collection in the lower abdominal mesentery measures approximately 1.9 x 1.7 cm (series 2, image 17).    Vasculature: No aneurysm or significant atherosclerosis.    Urinary bladder: There is reactive wall thickening of the urinary bladder.    Reproductive organs: Normal.    Body wall: No abnormality.    Musculoskeletal: No aggressive osseous lesion.                                       Medications   lactated ringers bolus 1,000 mL (1,000 mLs Intravenous Not Given 9/20/23 1945)   vancomycin (VANCOCIN) 2,000 mg in dextrose 5 % (D5W) 500 mL IVPB (has no administration in time range)   HYDROmorphone injection 1 mg (1 mg Intravenous Not Given 9/20/23 2015)   ondansetron injection 4 mg (4 mg Intravenous Given 9/20/23 1900)   sodium chloride 0.9% bolus 1,000 mL 1,000 mL (1,000 mLs Intravenous New Bag 9/20/23 1900)   piperacillin-tazobactam (ZOSYN) 4.5 g in dextrose 5 % in water (D5W) 100 mL IVPB (MB+) (0 g Intravenous Stopped 9/20/23 2011)   morphine injection 6 mg (6 mg Intravenous Given 9/20/23 1858)     Medical Decision Making  Amount and/or Complexity of Data Reviewed  Labs: ordered.  Radiology: ordered.    Risk  Prescription drug management.  Decision regarding hospitalization.         APC / Resident Notes:   Patient is a 33 y.o. male who presents to the ED 09/20/2023 who underwent emergent evaluation for Abdominal pain.  Patient has generalized abdominal tenderness and guarding.  He is initially tachycardic.  Septic workup initiated and IV abx and IVF's given. BP stable.  Lactic Acid level normal.  Noted leukocytosis.  Patient underwent emergent CT scan with findings of ruptured diverticula with multiple small surrounding abscesses and free air noted.  Discussed with Dr. Pascual who is on-call for General surgery who agrees with emergent surgery and admission hospital medicine team and IV antibiotics.  All findings discussed  with patient and family member who are agreeable to plan of care.  Case discussed with hospital medicine team is accepting of this admission with consultation to general surgeon.  Case also discussed with Dr. Burks who is also agreeable to plan of care. Pt transferred from ED to OR in stable condition.        Attending Attestation:     Physician Attestation Statement for NP/PA:   I have directed and reviewed the workup performed by the PA/NP.  I performed the substantive portion of the medical decision making.     Other NP/PA Attestation Additions:    History of Present Illness: 33-year-old male presented with abdominal pain.    Medical Decision Making: Initial differential diagnosis included but not limited to colitis, diverticulitis, and enteritis.  The patient's labs were significant for an elevated white blood cell count.  The patient's CT scan was significant for ruptured diverticulitis with multiple surrounding abscesses and free air noted.  The patient was treated with IV antibiotics here in the emergency department.  He has been consulted to General surgery.  He will be admitted to Hospital Medicine for further care.  I am in agreement with the nurse practitioner's assessment, treatment, and plan of care.             ED Course as of 09/20/23 2031   Wed Sep 20, 2023   1834 PATIENT HAS SIGNIFICANT LEUKOCYTOSIS AND TACHYCARDIA.  WILL CONSIDER SEPSIS.  LACTIC ACID CURRENTLY NORMAL.  WILL ADD BLOOD CULTURES AND IV ANTIBIOTICS. [JK]   1904 IV established.  Called CT scan to  patient. [JK]   1937 Pt returned from CT scan. VSS [JK]   1940 Radiologist spoke with nurse and reported perforated sigmoid colon diverticula with multiple surrounding small abscesses.  Spoke with Dr. Pascual who is on-call for general surgeon who will call out surgical team and recommends admission to hospital medicine team. [JK]   1941 With hospital medicine team is accepting of this admission with consultation to General surgery.  [JK]   1946 Pt and family updated.   [JK]      ED Course User Index  [JK] Crissy Sharpe NP               Medical Decision Making:   Differential Diagnosis:   Pyelonephritis  Obstructive uropathy  Colitis       Clinical Impression:   Final diagnoses:  [K63.1] Perforation bowel  [K57.80] Perforated diverticulum (Primary)        ED Disposition Condition    Admit Stable                Crissy Sharpe NP  09/20/23 2004       Umair Burks MD  09/20/23 2031

## 2023-09-21 NOTE — ANESTHESIA POSTPROCEDURE EVALUATION
Anesthesia Post Evaluation    Patient: Brayden Francois JrAntoine    Procedure(s) Performed: Procedure(s) (LRB):  LAPAROTOMY, EXPLORATORY (N/A)  COLECTOMY, SIGMOID (N/A)  CREATION, COLOSTOMY (N/A)  ABDOMINAL WASHOUT (N/A)    Final Anesthesia Type: general      Patient location during evaluation: PACU  Patient participation: Yes- Able to Participate  Level of consciousness: awake and alert  Post-procedure vital signs: reviewed and stable  Pain management: adequate  Airway patency: patent    PONV status at discharge: No PONV  Anesthetic complications: no      Cardiovascular status: blood pressure returned to baseline  Respiratory status: unassisted  Hydration status: euvolemic  Follow-up not needed.          Vitals Value Taken Time   /94 09/21/23 0524   Temp 36.3 °C (97.3 °F) 09/21/23 0408   Pulse 106 09/21/23 0601   Resp 13 09/21/23 0601   SpO2 96 % 09/21/23 0601   Vitals shown include unvalidated device data.      Event Time   Out of Recovery 09/21/2023 01:10:00         Pain/Hilary Score: Pain Rating Prior to Med Admin: 9 (9/21/2023  5:38 AM)  Pain Rating Post Med Admin: 6 (9/21/2023  4:15 AM)  Hilary Score: 8 (9/21/2023  1:00 AM)

## 2023-09-22 LAB
ALBUMIN SERPL BCP-MCNC: 2.8 G/DL (ref 3.5–5.2)
ALP SERPL-CCNC: 46 U/L (ref 55–135)
ALT SERPL W/O P-5'-P-CCNC: 17 U/L (ref 10–44)
ANION GAP SERPL CALC-SCNC: 10 MMOL/L (ref 8–16)
AST SERPL-CCNC: 27 U/L (ref 10–40)
BACTERIA #/AREA URNS HPF: ABNORMAL /HPF
BASOPHILS # BLD AUTO: 0.03 K/UL (ref 0–0.2)
BASOPHILS NFR BLD: 0.2 % (ref 0–1.9)
BILIRUB SERPL-MCNC: 0.5 MG/DL (ref 0.1–1)
BILIRUB UR QL STRIP: NEGATIVE
BUN SERPL-MCNC: 36 MG/DL (ref 6–20)
CALCIUM SERPL-MCNC: 8.1 MG/DL (ref 8.7–10.5)
CHLORIDE SERPL-SCNC: 94 MMOL/L (ref 95–110)
CLARITY UR: CLEAR
CO2 SERPL-SCNC: 29 MMOL/L (ref 23–29)
COLOR UR: YELLOW
CREAT SERPL-MCNC: 1.5 MG/DL (ref 0.5–1.4)
DIFFERENTIAL METHOD: ABNORMAL
EOSINOPHIL # BLD AUTO: 0 K/UL (ref 0–0.5)
EOSINOPHIL NFR BLD: 0.1 % (ref 0–8)
ERYTHROCYTE [DISTWIDTH] IN BLOOD BY AUTOMATED COUNT: 12.7 % (ref 11.5–14.5)
EST. GFR  (NO RACE VARIABLE): >60 ML/MIN/1.73 M^2
GLUCOSE SERPL-MCNC: 126 MG/DL (ref 70–110)
GLUCOSE UR QL STRIP: NEGATIVE
GRAM STN SPEC: NORMAL
GRAM STN SPEC: NORMAL
HCT VFR BLD AUTO: 37 % (ref 40–54)
HGB BLD-MCNC: 12.4 G/DL (ref 14–18)
HGB UR QL STRIP: ABNORMAL
HYALINE CASTS #/AREA URNS LPF: 28 /LPF
IMM GRANULOCYTES # BLD AUTO: 0.05 K/UL (ref 0–0.04)
IMM GRANULOCYTES NFR BLD AUTO: 0.4 % (ref 0–0.5)
KETONES UR QL STRIP: NEGATIVE
LEUKOCYTE ESTERASE UR QL STRIP: NEGATIVE
LYMPHOCYTES # BLD AUTO: 1.1 K/UL (ref 1–4.8)
LYMPHOCYTES NFR BLD: 8 % (ref 18–48)
MAGNESIUM SERPL-MCNC: 2.2 MG/DL (ref 1.6–2.6)
MCH RBC QN AUTO: 29.6 PG (ref 27–31)
MCHC RBC AUTO-ENTMCNC: 33.5 G/DL (ref 32–36)
MCV RBC AUTO: 88 FL (ref 82–98)
MICROSCOPIC COMMENT: ABNORMAL
MONOCYTES # BLD AUTO: 1.5 K/UL (ref 0.3–1)
MONOCYTES NFR BLD: 10.7 % (ref 4–15)
NEUTROPHILS # BLD AUTO: 11.1 K/UL (ref 1.8–7.7)
NEUTROPHILS NFR BLD: 80.6 % (ref 38–73)
NITRITE UR QL STRIP: NEGATIVE
NRBC BLD-RTO: 0 /100 WBC
PH UR STRIP: 6 [PH] (ref 5–8)
PLATELET # BLD AUTO: 283 K/UL (ref 150–450)
PMV BLD AUTO: 8.8 FL (ref 9.2–12.9)
POTASSIUM SERPL-SCNC: 4.5 MMOL/L (ref 3.5–5.1)
PROT SERPL-MCNC: 6.2 G/DL (ref 6–8.4)
PROT UR QL STRIP: ABNORMAL
RBC # BLD AUTO: 4.19 M/UL (ref 4.6–6.2)
RBC #/AREA URNS HPF: 5 /HPF (ref 0–4)
SODIUM SERPL-SCNC: 133 MMOL/L (ref 136–145)
SP GR UR STRIP: >1.03 (ref 1–1.03)
URN SPEC COLLECT METH UR: ABNORMAL
UROBILINOGEN UR STRIP-ACNC: NEGATIVE EU/DL
WBC # BLD AUTO: 13.71 K/UL (ref 3.9–12.7)
WBC #/AREA URNS HPF: 3 /HPF (ref 0–5)

## 2023-09-22 PROCEDURE — C1751 CATH, INF, PER/CENT/MIDLINE: HCPCS

## 2023-09-22 PROCEDURE — 27000221 HC OXYGEN, UP TO 24 HOURS

## 2023-09-22 PROCEDURE — 99900035 HC TECH TIME PER 15 MIN (STAT)

## 2023-09-22 PROCEDURE — 36410 VNPNXR 3YR/> PHY/QHP DX/THER: CPT

## 2023-09-22 PROCEDURE — 94799 UNLISTED PULMONARY SVC/PX: CPT

## 2023-09-22 PROCEDURE — 80053 COMPREHEN METABOLIC PANEL: CPT | Performed by: NURSE PRACTITIONER

## 2023-09-22 PROCEDURE — 36415 COLL VENOUS BLD VENIPUNCTURE: CPT | Performed by: NURSE PRACTITIONER

## 2023-09-22 PROCEDURE — 63600175 PHARM REV CODE 636 W HCPCS: Performed by: NURSE PRACTITIONER

## 2023-09-22 PROCEDURE — 76937 US GUIDE VASCULAR ACCESS: CPT

## 2023-09-22 PROCEDURE — 25000003 PHARM REV CODE 250: Performed by: HOSPITALIST

## 2023-09-22 PROCEDURE — 11000001 HC ACUTE MED/SURG PRIVATE ROOM

## 2023-09-22 PROCEDURE — 94761 N-INVAS EAR/PLS OXIMETRY MLT: CPT

## 2023-09-22 PROCEDURE — 25000003 PHARM REV CODE 250: Performed by: NURSE PRACTITIONER

## 2023-09-22 PROCEDURE — 63600175 PHARM REV CODE 636 W HCPCS: Performed by: STUDENT IN AN ORGANIZED HEALTH CARE EDUCATION/TRAINING PROGRAM

## 2023-09-22 PROCEDURE — 85025 COMPLETE CBC W/AUTO DIFF WBC: CPT | Performed by: NURSE PRACTITIONER

## 2023-09-22 PROCEDURE — 83735 ASSAY OF MAGNESIUM: CPT | Performed by: NURSE PRACTITIONER

## 2023-09-22 PROCEDURE — 63600175 PHARM REV CODE 636 W HCPCS: Performed by: HOSPITALIST

## 2023-09-22 RX ADMIN — MUPIROCIN: 20 OINTMENT TOPICAL at 08:09

## 2023-09-22 RX ADMIN — MORPHINE SULFATE 4 MG: 2 INJECTION, SOLUTION INTRAMUSCULAR; INTRAVENOUS at 03:09

## 2023-09-22 RX ADMIN — DULOXETINE HYDROCHLORIDE 60 MG: 30 CAPSULE, DELAYED RELEASE ORAL at 08:09

## 2023-09-22 RX ADMIN — MIRTAZAPINE 15 MG: 15 TABLET, FILM COATED ORAL at 09:09

## 2023-09-22 RX ADMIN — HYDROMORPHONE HYDROCHLORIDE 1 MG: 2 INJECTION INTRAMUSCULAR; INTRAVENOUS; SUBCUTANEOUS at 10:09

## 2023-09-22 RX ADMIN — HYDROMORPHONE HYDROCHLORIDE 1 MG: 2 INJECTION INTRAMUSCULAR; INTRAVENOUS; SUBCUTANEOUS at 08:09

## 2023-09-22 RX ADMIN — SODIUM CHLORIDE, POTASSIUM CHLORIDE, SODIUM LACTATE AND CALCIUM CHLORIDE: 600; 310; 30; 20 INJECTION, SOLUTION INTRAVENOUS at 02:09

## 2023-09-22 RX ADMIN — SODIUM CHLORIDE, POTASSIUM CHLORIDE, SODIUM LACTATE AND CALCIUM CHLORIDE 1000 ML: 600; 310; 30; 20 INJECTION, SOLUTION INTRAVENOUS at 12:09

## 2023-09-22 RX ADMIN — SODIUM CHLORIDE, POTASSIUM CHLORIDE, SODIUM LACTATE AND CALCIUM CHLORIDE: 600; 310; 30; 20 INJECTION, SOLUTION INTRAVENOUS at 10:09

## 2023-09-22 RX ADMIN — PIPERACILLIN AND TAZOBACTAM 4.5 G: 4; .5 INJECTION, POWDER, LYOPHILIZED, FOR SOLUTION INTRAVENOUS; PARENTERAL at 05:09

## 2023-09-22 RX ADMIN — HYDROMORPHONE HYDROCHLORIDE 1 MG: 2 INJECTION INTRAMUSCULAR; INTRAVENOUS; SUBCUTANEOUS at 05:09

## 2023-09-22 RX ADMIN — KETOROLAC TROMETHAMINE 30 MG: 30 INJECTION, SOLUTION INTRAMUSCULAR at 05:09

## 2023-09-22 RX ADMIN — KETOROLAC TROMETHAMINE 30 MG: 30 INJECTION, SOLUTION INTRAMUSCULAR at 02:09

## 2023-09-22 RX ADMIN — KETOROLAC TROMETHAMINE 30 MG: 30 INJECTION, SOLUTION INTRAMUSCULAR at 10:09

## 2023-09-22 RX ADMIN — PIPERACILLIN AND TAZOBACTAM 4.5 G: 4; .5 INJECTION, POWDER, LYOPHILIZED, FOR SOLUTION INTRAVENOUS; PARENTERAL at 09:09

## 2023-09-22 RX ADMIN — DIVALPROEX SODIUM 250 MG: 250 TABLET, DELAYED RELEASE ORAL at 09:09

## 2023-09-22 RX ADMIN — PANTOPRAZOLE SODIUM 40 MG: 40 TABLET, DELAYED RELEASE ORAL at 08:09

## 2023-09-22 RX ADMIN — RISPERIDONE 2 MG: 1 TABLET ORAL at 09:09

## 2023-09-22 RX ADMIN — DIVALPROEX SODIUM 250 MG: 250 TABLET, DELAYED RELEASE ORAL at 02:09

## 2023-09-22 RX ADMIN — DULOXETINE HYDROCHLORIDE 60 MG: 30 CAPSULE, DELAYED RELEASE ORAL at 09:09

## 2023-09-22 RX ADMIN — SODIUM CHLORIDE, POTASSIUM CHLORIDE, SODIUM LACTATE AND CALCIUM CHLORIDE: 600; 310; 30; 20 INJECTION, SOLUTION INTRAVENOUS at 08:09

## 2023-09-22 RX ADMIN — DIVALPROEX SODIUM 250 MG: 250 TABLET, DELAYED RELEASE ORAL at 08:09

## 2023-09-22 RX ADMIN — MORPHINE SULFATE 4 MG: 2 INJECTION, SOLUTION INTRAMUSCULAR; INTRAVENOUS at 07:09

## 2023-09-22 RX ADMIN — HYDROMORPHONE HYDROCHLORIDE 1 MG: 2 INJECTION INTRAMUSCULAR; INTRAVENOUS; SUBCUTANEOUS at 02:09

## 2023-09-22 RX ADMIN — PIPERACILLIN AND TAZOBACTAM 4.5 G: 4; .5 INJECTION, POWDER, LYOPHILIZED, FOR SOLUTION INTRAVENOUS; PARENTERAL at 02:09

## 2023-09-22 NOTE — PROGRESS NOTES
"[facility] Medicine  Progress Note    Patient Name: Brayden Francosi Jr.  MRN: [id number]  Patient Class: IP- Inpatient   Admission Date: 9/20/2023  Length of Stay: 2 days  Attending Physician: Roya Mackenzie MD  Primary Care Provider: Ibis Rolle NP        Subjective:     Principal Problem:Perforated diverticulum        HPI:  Mr. Francois is a 33 year old male with a history of polysubstance abuse and amphetamine induced psychosis who presents today with complaints of right sided abd pain. It is severe. Pain radiates to his back and has been present for about a week. Symptoms were acutely worse this morning and associated with N/V and fever. He denies chest pain, SOB, dizziness, or LOC. Pt was seen briefly on the way to the OR and was AAOx4. In the ED, he is febrile at 100.2, tachycardic 135, normotensive. WBC 25K and CT abd/pelvis reveals: "Finding compatible with perforated diverticulitis of the sigmoid colon, with several small fluid collections in the lower abdomen and pelvis as above. A neoplastic process or inflammatory bowel disease are not excluded". Findings discussed with Dr. Pascual who plans to take to the OR now for repair. Hospital medicine is consulted for admission.      Overview/Hospital Course:  No notes on file    Interval History:  Patient seen and examined.  Reports pain is a 8/10.  NG tube is in place.  Patient states he is very thirsty.    Review of Systems   Gastrointestinal:  Positive for abdominal pain. Negative for nausea and vomiting.     Objective:     Vital Signs (Most Recent):  Temp: 97.8 °F (36.6 °C) (09/22/23 1126)  Pulse: 108 (09/22/23 1259)  Resp: 18 (09/22/23 1402)  BP: (!) 148/85 (09/22/23 1200)  SpO2: 99 % (09/22/23 1259) Vital Signs (24h Range):  Temp:  [96.7 °F (35.9 °C)-98.2 °F (36.8 °C)] 97.8 °F (36.6 °C)  Pulse:  [] 108  Resp:  [9-24] 18  SpO2:  [92 %-99 %] 99 %  BP: (121-170)/() 148/85     Weight: 105.1 kg (231 lb 11.3 " oz)  Body mass index is 34.22 kg/m².    Intake/Output Summary (Last 24 hours) at 9/22/2023 1406  Last data filed at 9/22/2023 1035  Gross per 24 hour   Intake 1885.9 ml   Output 3425 ml   Net -1539.1 ml           Physical Exam  Constitutional:       General: He is not in acute distress.     Appearance: He is well-developed. He is ill-appearing.   HENT:      Head: Normocephalic and atraumatic.   Eyes:      Pupils: Pupils are equal, round, and reactive to light.   Cardiovascular:      Rate and Rhythm: Normal rate and regular rhythm.      Heart sounds: No murmur heard.  Pulmonary:      Effort: Pulmonary effort is normal. No respiratory distress.      Breath sounds: Normal breath sounds. No wheezing or rales.   Abdominal:      General: There is no distension.      Palpations: Abdomen is soft.      Tenderness: There is abdominal tenderness (Diffuse, left side greater than right).      Comments: Ostomy   Musculoskeletal:         General: Normal range of motion.   Skin:     General: Skin is warm and dry.      Findings: No rash.      Comments: Multiple tattoos   Neurological:      Mental Status: He is alert and oriented to person, place, and time.      Cranial Nerves: No cranial nerve deficit.   Psychiatric:         Behavior: Behavior normal.             Significant Labs: All pertinent labs within the past 24 hours have been reviewed.    Significant Imaging: I have reviewed all pertinent imaging results/findings within the past 24 hours.      Assessment/Plan:      * Perforated diverticulum  Admit to step down  Status post sigmoid colectomy with colostomy creation with Dr. Pascual  Post op care as per his orders  Pain control   IV antiemetics  IV hydration   Continue zosyn      Severe sepsis  This patient does have evidence of infective focus  My overall impression is sepsis.  Source: Abdominal  Antibiotics given-   Antibiotics (72h ago, onward)    Start     Stop Route Frequency Ordered    09/21/23 2200   piperacillin-tazobactam (ZOSYN) 4.5 g in dextrose 5 % in water (D5W) 100 mL IVPB (MB+)         -- IV Every 8 hours (non-standard times) 09/21/23 2150    09/21/23 2100  mupirocin 2 % ointment         09/26/23 2059 Nasl 2 times daily 09/21/23 1027        Latest lactate reviewed-  Recent Labs   Lab 09/20/23  1638   LACTATE 2.0     Organ dysfunction indicated by n/a    Fluid challenge Not needed - patient is not hypotensive      Post- resuscitation assessment No - Post resuscitation assessment not needed       Will Not start Pressors- Levophed for MAP of 65  Source control achieved by: surgical intervention and abx    Substance abuse with history of psychosis  Continue home psych medications         VTE Risk Mitigation (From admission, onward)         Ordered     IP VTE HIGH RISK PATIENT  Once         09/20/23 2303     Place sequential compression device  Until discontinued         09/20/23 2303                Discharge Planning   YOLANDA: 9/25/2023     Code Status: Full Code   Is the patient medically ready for discharge?:     Reason for patient still in hospital (select all that apply): Patient trending condition and Treatment  Discharge Plan A: Home with family                  Roya Mackenzie MD  Department of Hospital Medicine   [facility]/Surg

## 2023-09-22 NOTE — PLAN OF CARE
Problem: Adult Inpatient Plan of Care  Goal: Plan of Care Review  Outcome: Ongoing, Progressing     Problem: Infection  Goal: Absence of Infection Signs and Symptoms  Outcome: Ongoing, Progressing     Problem: Fall Injury Risk  Goal: Absence of Fall and Fall-Related Injury  Outcome: Ongoing, Progressing     Problem: Nutrition Impaired (Sepsis/Septic Shock)  Goal: Optimal Nutrition Intake  Outcome: Ongoing, Progressing     Problem: Bowel Motility Impaired (Surgery Nonspecified)  Goal: Effective Bowel Elimination  Outcome: Ongoing, Progressing     Problem: Pain (Surgery Nonspecified)  Goal: Acceptable Pain Control  Outcome: Ongoing, Progressing      Encouraged pt to use Incentive spirometer often.  Teachback method done.  Pt continually calls out for ice and water.  Reeducated several times on importance of sips only occasionally.  NG tube to LIS with brown liquid on return.  SCDs intact to BLE.  Call light within reach.  Pt's mother at bedside.

## 2023-09-22 NOTE — PROGRESS NOTES
Patient seen and examined.  No significant changes.  Reports low urine output    Vitals are stable  Afebrile  Abdomen soft appropriately tender   Ostomy seems dusky in the appliance.  Minimal to no output.  A little bit of bloody fluid    Labs reviewed, leukocytosis is decreasing   Creatinine increased slightly    Postop day 2 exploratory laparotomy for sigmoid perforation with end colostomy.    Await better bowel function prior to NG tube removal  Fluid bolus   PT   Ostomy consult   Following

## 2023-09-22 NOTE — PLAN OF CARE
Recommendations  1) Advance PO diet as medically able to goal of low fiber   2) Nutrition education and handout given-review with mother at f/u   3) weigh weekly    Goals: 1) PO diet advanced to at least full liquids in < 5 days  Nutrition Goal Status: new  Communication of RD Recs: reviewed with RN (POC, sticky note)

## 2023-09-22 NOTE — ASSESSMENT & PLAN NOTE
This patient does have evidence of infective focus  My overall impression is sepsis.  Source: Abdominal  Antibiotics given-   Antibiotics (72h ago, onward)    Start     Stop Route Frequency Ordered    09/21/23 2200  piperacillin-tazobactam (ZOSYN) 4.5 g in dextrose 5 % in water (D5W) 100 mL IVPB (MB+)         -- IV Every 8 hours (non-standard times) 09/21/23 2150    09/21/23 2100  mupirocin 2 % ointment         09/26/23 2059 Nasl 2 times daily 09/21/23 1027        Latest lactate reviewed-  Recent Labs   Lab 09/20/23  1638   LACTATE 2.0     Organ dysfunction indicated by n/a    Fluid challenge Not needed - patient is not hypotensive      Post- resuscitation assessment No - Post resuscitation assessment not needed       Will Not start Pressors- Levophed for MAP of 65  Source control achieved by: surgical intervention and abx

## 2023-09-22 NOTE — CARE UPDATE
09/22/23 0813   Patient Assessment/Suction   Level of Consciousness (AVPU) alert   Respiratory Effort Unlabored   Expansion/Accessory Muscles/Retractions no use of accessory muscles;no retractions;expansion symmetric   Rhythm/Pattern, Respiratory unlabored   PRE-TX-O2   Device (Oxygen Therapy) nasal cannula   $ Is the patient on Low Flow Oxygen? Yes   Oxygen Concentration (%) 2   SpO2 98 %   Pulse Oximetry Type Continuous   $ Pulse Oximetry - Multiple Charge Pulse Oximetry - Multiple   Pulse 107   Resp 18   Incentive Spirometer   $ Incentive Spirometer Charges unable to perform  (pt in pain)

## 2023-09-22 NOTE — PLAN OF CARE
Problem: Adult Inpatient Plan of Care  Goal: Plan of Care Review  Outcome: Ongoing, Progressing  Goal: Patient-Specific Goal (Individualized)  Outcome: Ongoing, Progressing  Goal: Absence of Hospital-Acquired Illness or Injury  Outcome: Ongoing, Progressing  Goal: Optimal Comfort and Wellbeing  Outcome: Ongoing, Progressing  Goal: Readiness for Transition of Care  Outcome: Ongoing, Progressing     Problem: Infection  Goal: Absence of Infection Signs and Symptoms  Outcome: Ongoing, Progressing     Problem: Adjustment to Illness (Sepsis/Septic Shock)  Goal: Optimal Coping  Outcome: Ongoing, Progressing     Problem: Bleeding (Sepsis/Septic Shock)  Goal: Absence of Bleeding  Outcome: Ongoing, Progressing     Problem: Glycemic Control Impaired (Sepsis/Septic Shock)  Goal: Blood Glucose Level Within Desired Range  Outcome: Ongoing, Progressing     Problem: Infection Progression (Sepsis/Septic Shock)  Goal: Absence of Infection Signs and Symptoms  Outcome: Ongoing, Progressing     Problem: Nutrition Impaired (Sepsis/Septic Shock)  Goal: Optimal Nutrition Intake  Outcome: Ongoing, Progressing     Problem: Fall Injury Risk  Goal: Absence of Fall and Fall-Related Injury  Outcome: Ongoing, Progressing     Problem: Bleeding (Surgery Nonspecified)  Goal: Absence of Bleeding  Outcome: Ongoing, Progressing     Problem: Bowel Motility Impaired (Surgery Nonspecified)  Goal: Effective Bowel Elimination  Outcome: Ongoing, Progressing     Problem: Fluid and Electrolyte Imbalance (Surgery Nonspecified)  Goal: Fluid and Electrolyte Balance  Outcome: Ongoing, Progressing     Problem: Glycemic Control Impaired (Surgery Nonspecified)  Goal: Blood Glucose Level Within Targeted Range  Outcome: Ongoing, Progressing     Problem: Infection (Surgery Nonspecified)  Goal: Absence of Infection Signs and Symptoms  Outcome: Ongoing, Progressing     Problem: Ongoing Anesthesia Effects (Surgery Nonspecified)  Goal: Anesthesia/Sedation  Recovery  Outcome: Ongoing, Progressing     Problem: Pain (Surgery Nonspecified)  Goal: Acceptable Pain Control  Outcome: Ongoing, Progressing     Problem: Postoperative Nausea and Vomiting (Surgery Nonspecified)  Goal: Nausea and Vomiting Relief  Outcome: Ongoing, Progressing     Problem: Postoperative Urinary Retention (Surgery Nonspecified)  Goal: Effective Urinary Elimination  Outcome: Ongoing, Progressing     Problem: Respiratory Compromise (Surgery Nonspecified)  Goal: Effective Oxygenation and Ventilation  Outcome: Ongoing, Progressing     Problem: Oral Intake Inadequate  Goal: Improved Oral Intake  Outcome: Ongoing, Progressing

## 2023-09-22 NOTE — EICU
Intervention Initiated From:  [facility] / EICU    Miguel A intervened regarding:  Rounding (Video assessment)    Nurse Notified:  No    Doctor Notified:  No    Comments: Rounding done. Alert. On IV fluid @ 125/hour. On 2 liter nasal cannula. B/P 143/91, , sat 97. Side rails up x4

## 2023-09-22 NOTE — HOSPITAL COURSE
" Mr. Francois is a 33 year old male with a history of polysubstance abuse and amphetamine induced psychosis who presents today with complaints of right sided abd pain. It is severe. Pain radiates to his back and has been present for about a week. Symptoms were acutely worse this morning and associated with N/V and fever. He denies chest pain, SOB, dizziness, or LOC. Pt was seen briefly on the way to the OR and was AAOx4. In the ED, he is febrile at 100.2, tachycardic 135, normotensive. WBC 25K and CT abd/pelvis reveals: "Finding compatible with perforated diverticulitis of the sigmoid colon, with several small fluid collections in the lower abdomen and pelvis as above. A neoplastic process or inflammatory bowel disease are not excluded". Findings discussed with Dr. Pascual who plans to take to the OR now for repair. Hospital medicine is consulted for admission. He was taken to the OR and underwent sigmoid colectomy with colostomy formation.  It was placed on IV antibiotics.  NG tube was kept in place postoperatively.  He was monitored for return of bowel function.    NGT removed. OStomy bag distended with air. Vitals stable; transfer out stepdown unit. Diet advanced as tolerated. Discussed plan for oral pain meds; dc planning. Needs ostomy teaching.     Received ostomy teaching with repeat teaching tomorrow. Pain not controlled per patient. DC instructions:    Ostomy Care for home:  Ostomy Care:  Empty ostomy bag before going to bed at night.  Empty ostomy bag when up to the bathroom to urinate during the night.   Empty the ostomy bag when it is half full to avoid overfilled bag which may cause the bag to leak.     Ostomy Change two to three times a week as long as a good seal is obtained and no leaking occurs. NOTE a scheduled bag change should be first thing in the morning before the patient eats or drinks anything to avoid leakage while changing the device.   1. Gather supplies to change the ostomy such as a large " towel, wet wash cloths, sting free adhesive remover, sting free skin barrier, strip paste(caulking), ostomy bag and ostomy scissors.  2. Apply sting free adhesive remover along the top of the ostomy bag while pealing back the bag from top to bottom and then throw this bag into the garbage.  3. Clean skin around the stoma with wash cloth. May use mild soap if needed and rinse and then dry this area.   4. Apply sting free skin barrier to the skin surrounding the stoma  5. Apply stoma stick paste  or ring paste to the skin at the base of the stoma   6. Apply cut bag to the area attaching this to the stoma strip paste and the skin.  7. Hold your hand over the ostomy adhesive area for 5 minutes to help obtain a good seal.      When to call your surgeon:  1. If there is no output from your stoma  2. If there is a large amount of bright red blood noted in the bag  3. If you experience severe pain that is a new pain around your stoma Ostomy Care:  Empty ostomy bag before going to bed at night.  Empty ostomy bag when up to the bathroom to urinate during the night.   Empty the ostomy bag when it is half full to avoid overfilled bag which may cause the ostomy to leak.

## 2023-09-22 NOTE — SUBJECTIVE & OBJECTIVE
Interval History:  Patient seen and examined.  Reports pain is a 8/10.  NG tube is in place.  Patient states he is very thirsty.    Review of Systems   Gastrointestinal:  Positive for abdominal pain. Negative for nausea and vomiting.     Objective:     Vital Signs (Most Recent):  Temp: 97.8 °F (36.6 °C) (09/22/23 1126)  Pulse: 108 (09/22/23 1259)  Resp: 18 (09/22/23 1402)  BP: (!) 148/85 (09/22/23 1200)  SpO2: 99 % (09/22/23 1259) Vital Signs (24h Range):  Temp:  [96.7 °F (35.9 °C)-98.2 °F (36.8 °C)] 97.8 °F (36.6 °C)  Pulse:  [] 108  Resp:  [9-24] 18  SpO2:  [92 %-99 %] 99 %  BP: (121-170)/() 148/85     Weight: 105.1 kg (231 lb 11.3 oz)  Body mass index is 34.22 kg/m².    Intake/Output Summary (Last 24 hours) at 9/22/2023 1406  Last data filed at 9/22/2023 1035  Gross per 24 hour   Intake 1885.9 ml   Output 3425 ml   Net -1539.1 ml           Physical Exam  Constitutional:       General: He is not in acute distress.     Appearance: He is well-developed. He is ill-appearing.   HENT:      Head: Normocephalic and atraumatic.   Eyes:      Pupils: Pupils are equal, round, and reactive to light.   Cardiovascular:      Rate and Rhythm: Normal rate and regular rhythm.      Heart sounds: No murmur heard.  Pulmonary:      Effort: Pulmonary effort is normal. No respiratory distress.      Breath sounds: Normal breath sounds. No wheezing or rales.   Abdominal:      General: There is no distension.      Palpations: Abdomen is soft.      Tenderness: There is abdominal tenderness (Diffuse, left side greater than right).      Comments: Ostomy   Musculoskeletal:         General: Normal range of motion.   Skin:     General: Skin is warm and dry.      Findings: No rash.      Comments: Multiple tattoos   Neurological:      Mental Status: He is alert and oriented to person, place, and time.      Cranial Nerves: No cranial nerve deficit.   Psychiatric:         Behavior: Behavior normal.             Significant Labs: All  pertinent labs within the past 24 hours have been reviewed.    Significant Imaging: I have reviewed all pertinent imaging results/findings within the past 24 hours.

## 2023-09-22 NOTE — CONSULTS
18 Gx 10cm PowerGlide Midline placed to pts basilic vein with the use of ultrasound guidance.    Ultrasound guidance: yes  Vessel Caliber: large and patent, compressibility normal  Needle advanced into vessel with real time Ultrasound guidance.  Guidewire confirmed in vessel.  Image recorded and saved.  Sterile sheath used.  Sterile dressing applied  Arm circumference- 35.2cm  Dressing dated   Education provided to patient re: proper maintenance of line- pt verbalized understanding  Limb alert applied.

## 2023-09-22 NOTE — PROGRESS NOTES
[facility]/Surg  Adult Nutrition  Progress Note    SUMMARY       Recommendations  1) Continue Low fiber diet  2) Nutrition education and handout given-reviewed with mother  3) weigh weekly  4) Add Boost plus vanilla BID    Goals: 1) PO diet advanced to at least full liquids in < 5 days 2) Tolerating solid foods at f/u  Nutrition Goal Status: met/ new  Communication of RD Recs: poc,sticky note    Assessment and Plan    Inadequate protein-energy intake  R/t NPO, altered GI function  As evidenced by PO intakes < 50% of needs x > 3 days  Intervention: nutrition education, collaboration with other providers  improving    Malnutrition Assessment     Skin (Micronutrient):  (Elvis = 19, abd. Incision + colostomy 110ml output)  Teeth (Micronutrient): none   Micronutrient Evaluation Summary: suspected deficiency  Micronutrient Evaluation Comments: check iron, Na   Subcutaneous Fat (Malnutrition): other (see comments) (WDL)  Muscle Mass (Malnutrition): other (see comments) (WDL)                         Reason for Assessment    Reason For Assessment: follow up  Diagnosis:  (perforated diverticulum)  Past Medical History:   Diagnosis Date    Psychoactive substance-induced psychosis    Interdisciplinary Rounds: did not attend    General Information Comments: 34 y/o male admitted with perforated divertiulum POD 1 colectomy/colostomy. + NGT, tolerating sips and chips. Pt has poor understanding of his medicaly condition, asking me and team members multiple times how this happened/ what surgery he had. I explained diet advancement for colostomy, discussed fluid needs, the slow addition of fiber back into the diet and reviewed lists of foods that can cause blockages/odor/diarrhea etc.. pt concerned he won't be able to eat normally, I reassured him multiple time that he would, reassured him that the fiber restriction should be temporary. He does not seem to grasp the slow addition of fiber back into his diet- asking  "me to speak with his mother. I will educate mother at f/u as she is not visiting at this time. NFPE WDL 23.  23 Pt is tolerating soft food items best ( apple sauce, ice cream etc..) had solid food one meal but the gas was painful. Pt agreeable to protein shakes. Mother was in his room and I reviewed diet education with her as well as tips for adding protein to soft foods.    Nutrition Discharge Planning: To be determined- low fiber diet ( slowly adding fiber back into diet) + protein shake of choice if needed    Nutrition Risk Screen    Nutrition Risk Screen: no indicators present    Nutrition/Diet History    Patient Reported Diet/Restrictions/Preferences: general  Spiritual, Cultural Beliefs, Alevism Practices, Values that Affect Care: no  Food Allergies: NKFA  Factors Affecting Nutritional Intake: altered gastrointestinal function    Anthropometrics    Temp: 97.8 °F (36.6 °C)  Height Method: Stated  Height: 5' 9" (175.3 cm)  Height (inches): 69 in  Weight Method: Bed Scale  Weight: 105.1 kg (231 lb 11.3 oz)  Weight (lb): 231.71 lb  Ideal Body Weight (IBW), Male: 160 lb  % Ideal Body Weight, Male (lb): 144.82 %  BMI (Calculated): 34.2  BMI Grade: 30 - 34.9- obesity - grade I  Usual Body Weight (UBW), k.6 kg (23)  % Usual Body Weight: 145.07  % Weight Change From Usual Weight: 44.76 %       Lab/Procedures/Meds    Pertinent Labs Reviewed: reviewed  BMP  Lab Results   Component Value Date     2023    K 3.7 2023     2023    CO2 26 2023    BUN 6 2023    CREATININE 0.8 2023    CALCIUM 8.4 (L) 2023    ANIONGAP 9 2023    EGFRNORACEVR >60 2023     Lab Results   Component Value Date    ALBUMIN 2.0 (L) 2023       Pertinent Medications Reviewed: reviewed  Docusate, statin, polyethylene glycol, insulin, zofran    Estimated/Assessed Needs    Weight Used For Calorie Calculations: 105.1 kg (231 lb 11.3 oz)  Energy Calorie Requirements " (kcal): MSJ ( no AF obesity) = 1990 kcal  Energy Need Method: [place]  Protein Requirements: 0.8-1 g protein/kg (  g)  Weight Used For Protein Calculations: 105.1 kg (231 lb 11.3 oz)  Fluid Requirements (mL): 2000 ml or per MD  Estimated Fluid Requirement Method: RDA Method  CHO Requirement: N/A      Nutrition Prescription Ordered    Current Diet Order: low fiber    Evaluation of Received Nutrient/Fluid Intake    Energy Calories Required: not meeting needs  Protein Required: not meeting needs  Fluid Required: not meeting needs  Tolerance: tolerating     Intake/Output Summary (Last 24 hours) at 9/26/2023 1018  Last data filed at 9/26/2023 0839  Gross per 24 hour   Intake 2957.51 ml   Output 4900 ml   Net -1942.49 ml           % Intake of Estimated Energy Needs: 25-50%  % Meal Intake: 25-50%    Nutrition Risk    Level of Risk/Frequency of Follow-up:  (2 x weekly)     Monitor and Evaluation    Food and Nutrient Intake: energy intake, food and beverage intake  Food and Nutrient Adminstration: diet order  Knowledge/Beliefs/Attitudes: food and nutrition knowledge/skill  Anthropometric Measurements: weight  Biochemical Data, Medical Tests and Procedures: electrolyte and renal panel, gastrointestinal profile  Nutrition-Focused Physical Findings: overall appearance     Nutrition Follow-Up    RD Follow-up?: Yes

## 2023-09-22 NOTE — EICU
Intervention Initiated From:  [facility] / EICU    Miguel A intervened regarding:  Documentation    Nurse Notified:  Yes    Doctor Notified:  No    Comments:Video rounding completed. Patient reports did not sleep last night due to continuous muscle spasms.HOB elevated. Remains NPO. VS on cardiac monitorP: P: 103-ST RR 13, Sa02 96%NIBP: 145/85 (108) NGT right nare. PIV 20 gauze right forearm with IVF: LR @ 125 cc/hr. Patient voids but states having to strain to urinate. Patient encouraged to relax and if needed call for pain medication. WCTM as needed. Will notify bedside nurse

## 2023-09-23 LAB
ALBUMIN SERPL BCP-MCNC: 2.3 G/DL (ref 3.5–5.2)
ALP SERPL-CCNC: 51 U/L (ref 55–135)
ALT SERPL W/O P-5'-P-CCNC: 15 U/L (ref 10–44)
ANION GAP SERPL CALC-SCNC: 12 MMOL/L (ref 8–16)
AST SERPL-CCNC: 25 U/L (ref 10–40)
BACTERIA SPEC ANAEROBE CULT: NORMAL
BASOPHILS # BLD AUTO: 0.04 K/UL (ref 0–0.2)
BASOPHILS NFR BLD: 0.3 % (ref 0–1.9)
BILIRUB SERPL-MCNC: 0.4 MG/DL (ref 0.1–1)
BUN SERPL-MCNC: 20 MG/DL (ref 6–20)
CALCIUM SERPL-MCNC: 8.4 MG/DL (ref 8.7–10.5)
CHLORIDE SERPL-SCNC: 93 MMOL/L (ref 95–110)
CO2 SERPL-SCNC: 29 MMOL/L (ref 23–29)
CREAT SERPL-MCNC: 0.9 MG/DL (ref 0.5–1.4)
DIFFERENTIAL METHOD: ABNORMAL
EOSINOPHIL # BLD AUTO: 0.1 K/UL (ref 0–0.5)
EOSINOPHIL NFR BLD: 0.9 % (ref 0–8)
ERYTHROCYTE [DISTWIDTH] IN BLOOD BY AUTOMATED COUNT: 12.5 % (ref 11.5–14.5)
EST. GFR  (NO RACE VARIABLE): >60 ML/MIN/1.73 M^2
GLUCOSE SERPL-MCNC: 98 MG/DL (ref 70–110)
HCT VFR BLD AUTO: 29.6 % (ref 40–54)
HGB BLD-MCNC: 9.9 G/DL (ref 14–18)
IMM GRANULOCYTES # BLD AUTO: 0.08 K/UL (ref 0–0.04)
IMM GRANULOCYTES NFR BLD AUTO: 0.6 % (ref 0–0.5)
LYMPHOCYTES # BLD AUTO: 1.2 K/UL (ref 1–4.8)
LYMPHOCYTES NFR BLD: 9.2 % (ref 18–48)
MAGNESIUM SERPL-MCNC: 2.2 MG/DL (ref 1.6–2.6)
MCH RBC QN AUTO: 29.4 PG (ref 27–31)
MCHC RBC AUTO-ENTMCNC: 33.4 G/DL (ref 32–36)
MCV RBC AUTO: 88 FL (ref 82–98)
MONOCYTES # BLD AUTO: 1.4 K/UL (ref 0.3–1)
MONOCYTES NFR BLD: 11 % (ref 4–15)
NEUTROPHILS # BLD AUTO: 9.9 K/UL (ref 1.8–7.7)
NEUTROPHILS NFR BLD: 78 % (ref 38–73)
NRBC BLD-RTO: 0 /100 WBC
PLATELET # BLD AUTO: 267 K/UL (ref 150–450)
PMV BLD AUTO: 8.8 FL (ref 9.2–12.9)
POTASSIUM SERPL-SCNC: 3.9 MMOL/L (ref 3.5–5.1)
PROT SERPL-MCNC: 5.9 G/DL (ref 6–8.4)
RBC # BLD AUTO: 3.37 M/UL (ref 4.6–6.2)
SODIUM SERPL-SCNC: 134 MMOL/L (ref 136–145)
WBC # BLD AUTO: 12.69 K/UL (ref 3.9–12.7)

## 2023-09-23 PROCEDURE — 25000003 PHARM REV CODE 250: Performed by: HOSPITALIST

## 2023-09-23 PROCEDURE — 11000001 HC ACUTE MED/SURG PRIVATE ROOM

## 2023-09-23 PROCEDURE — 63600175 PHARM REV CODE 636 W HCPCS: Performed by: HOSPITALIST

## 2023-09-23 PROCEDURE — 63600175 PHARM REV CODE 636 W HCPCS: Performed by: STUDENT IN AN ORGANIZED HEALTH CARE EDUCATION/TRAINING PROGRAM

## 2023-09-23 PROCEDURE — 25000003 PHARM REV CODE 250: Performed by: NURSE PRACTITIONER

## 2023-09-23 PROCEDURE — 97161 PT EVAL LOW COMPLEX 20 MIN: CPT

## 2023-09-23 PROCEDURE — 63600175 PHARM REV CODE 636 W HCPCS: Performed by: NURSE PRACTITIONER

## 2023-09-23 PROCEDURE — 85025 COMPLETE CBC W/AUTO DIFF WBC: CPT | Performed by: NURSE PRACTITIONER

## 2023-09-23 PROCEDURE — 80053 COMPREHEN METABOLIC PANEL: CPT | Performed by: NURSE PRACTITIONER

## 2023-09-23 PROCEDURE — 94799 UNLISTED PULMONARY SVC/PX: CPT

## 2023-09-23 PROCEDURE — 99900031 HC PATIENT EDUCATION (STAT)

## 2023-09-23 PROCEDURE — 94761 N-INVAS EAR/PLS OXIMETRY MLT: CPT

## 2023-09-23 PROCEDURE — 83735 ASSAY OF MAGNESIUM: CPT | Performed by: NURSE PRACTITIONER

## 2023-09-23 PROCEDURE — 36415 COLL VENOUS BLD VENIPUNCTURE: CPT | Performed by: NURSE PRACTITIONER

## 2023-09-23 PROCEDURE — 97116 GAIT TRAINING THERAPY: CPT

## 2023-09-23 PROCEDURE — 27000221 HC OXYGEN, UP TO 24 HOURS

## 2023-09-23 RX ADMIN — MIRTAZAPINE 15 MG: 15 TABLET, FILM COATED ORAL at 09:09

## 2023-09-23 RX ADMIN — DIVALPROEX SODIUM 250 MG: 250 TABLET, DELAYED RELEASE ORAL at 09:09

## 2023-09-23 RX ADMIN — HYDROMORPHONE HYDROCHLORIDE 1 MG: 2 INJECTION INTRAMUSCULAR; INTRAVENOUS; SUBCUTANEOUS at 12:09

## 2023-09-23 RX ADMIN — HYDROMORPHONE HYDROCHLORIDE 1 MG: 2 INJECTION INTRAMUSCULAR; INTRAVENOUS; SUBCUTANEOUS at 06:09

## 2023-09-23 RX ADMIN — PIPERACILLIN AND TAZOBACTAM 4.5 G: 4; .5 INJECTION, POWDER, LYOPHILIZED, FOR SOLUTION INTRAVENOUS; PARENTERAL at 06:09

## 2023-09-23 RX ADMIN — SODIUM CHLORIDE, POTASSIUM CHLORIDE, SODIUM LACTATE AND CALCIUM CHLORIDE: 600; 310; 30; 20 INJECTION, SOLUTION INTRAVENOUS at 11:09

## 2023-09-23 RX ADMIN — HYDROMORPHONE HYDROCHLORIDE 1 MG: 2 INJECTION INTRAMUSCULAR; INTRAVENOUS; SUBCUTANEOUS at 07:09

## 2023-09-23 RX ADMIN — DULOXETINE HYDROCHLORIDE 60 MG: 30 CAPSULE, DELAYED RELEASE ORAL at 09:09

## 2023-09-23 RX ADMIN — PANTOPRAZOLE SODIUM 40 MG: 40 TABLET, DELAYED RELEASE ORAL at 09:09

## 2023-09-23 RX ADMIN — PIPERACILLIN AND TAZOBACTAM 4.5 G: 4; .5 INJECTION, POWDER, LYOPHILIZED, FOR SOLUTION INTRAVENOUS; PARENTERAL at 09:09

## 2023-09-23 RX ADMIN — SODIUM CHLORIDE, POTASSIUM CHLORIDE, SODIUM LACTATE AND CALCIUM CHLORIDE: 600; 310; 30; 20 INJECTION, SOLUTION INTRAVENOUS at 07:09

## 2023-09-23 RX ADMIN — PIPERACILLIN AND TAZOBACTAM 4.5 G: 4; .5 INJECTION, POWDER, LYOPHILIZED, FOR SOLUTION INTRAVENOUS; PARENTERAL at 01:09

## 2023-09-23 RX ADMIN — DIVALPROEX SODIUM 250 MG: 250 TABLET, DELAYED RELEASE ORAL at 04:09

## 2023-09-23 RX ADMIN — MUPIROCIN: 20 OINTMENT TOPICAL at 09:09

## 2023-09-23 RX ADMIN — KETOROLAC TROMETHAMINE 30 MG: 30 INJECTION, SOLUTION INTRAMUSCULAR at 06:09

## 2023-09-23 RX ADMIN — MORPHINE SULFATE 4 MG: 2 INJECTION, SOLUTION INTRAMUSCULAR; INTRAVENOUS at 03:09

## 2023-09-23 RX ADMIN — HYDROMORPHONE HYDROCHLORIDE 1 MG: 2 INJECTION INTRAMUSCULAR; INTRAVENOUS; SUBCUTANEOUS at 01:09

## 2023-09-23 RX ADMIN — KETOROLAC TROMETHAMINE 30 MG: 30 INJECTION, SOLUTION INTRAMUSCULAR at 09:09

## 2023-09-23 RX ADMIN — KETOROLAC TROMETHAMINE 30 MG: 30 INJECTION, SOLUTION INTRAMUSCULAR at 01:09

## 2023-09-23 RX ADMIN — RISPERIDONE 2 MG: 1 TABLET ORAL at 09:09

## 2023-09-23 NOTE — PROGRESS NOTES
POD 3 s/p ex lap with Menard's procedure.  Pt has been having flatus from ostomy.  No n/v.  Minimal output from NG  Ambulating    Wt Readings from Last 3 Encounters:   09/22/23 105.1 kg (231 lb 11.3 oz)   05/24/23 84.4 kg (185 lb 15.7 oz)   05/24/23 84.4 kg (186 lb 1.1 oz)     Temp Readings from Last 3 Encounters:   09/23/23 99.2 °F (37.3 °C) (Oral)   05/24/23 98.9 °F (37.2 °C) (Oral)   04/23/23 98 °F (36.7 °C) (Oral)     BP Readings from Last 3 Encounters:   09/23/23 (!) 156/83   05/24/23 (!) 181/103   04/23/23 (!) 146/91     Pulse Readings from Last 3 Encounters:   09/23/23 101   05/24/23 (!) 123   04/23/23 95     AAox3  Sinus  Soft/nd/appt ttp    Lab Results   Component Value Date    WBC 12.69 09/23/2023    HGB 9.9 (L) 09/23/2023    HCT 29.6 (L) 09/23/2023    MCV 88 09/23/2023     09/23/2023       BMP  Lab Results   Component Value Date     (L) 09/23/2023    K 3.9 09/23/2023    CL 93 (L) 09/23/2023    CO2 29 09/23/2023    BUN 20 09/23/2023    CREATININE 0.9 09/23/2023    CALCIUM 8.4 (L) 09/23/2023    ANIONGAP 12 09/23/2023    EGFRNORACEVR >60 09/23/2023     A/P: s/p Menard's  Ok to remove NG  Start sips of clears   Cont to ambulate

## 2023-09-23 NOTE — PLAN OF CARE
Problem: Adult Inpatient Plan of Care  Goal: Plan of Care Review  Outcome: Ongoing, Progressing  Goal: Patient-Specific Goal (Individualized)  Outcome: Ongoing, Progressing  Goal: Absence of Hospital-Acquired Illness or Injury  Outcome: Ongoing, Progressing  Goal: Optimal Comfort and Wellbeing  Outcome: Ongoing, Progressing  Goal: Readiness for Transition of Care  Outcome: Ongoing, Progressing     Problem: Infection  Goal: Absence of Infection Signs and Symptoms  Outcome: Ongoing, Progressing     Problem: Adjustment to Illness (Sepsis/Septic Shock)  Goal: Optimal Coping  Outcome: Ongoing, Progressing     Problem: Bleeding (Sepsis/Septic Shock)  Goal: Absence of Bleeding  Outcome: Ongoing, Progressing     Problem: Glycemic Control Impaired (Sepsis/Septic Shock)  Goal: Blood Glucose Level Within Desired Range  Outcome: Ongoing, Progressing     Problem: Infection Progression (Sepsis/Septic Shock)  Goal: Absence of Infection Signs and Symptoms  Outcome: Ongoing, Progressing     Problem: Nutrition Impaired (Sepsis/Septic Shock)  Goal: Optimal Nutrition Intake  Outcome: Ongoing, Progressing     Problem: Fall Injury Risk  Goal: Absence of Fall and Fall-Related Injury  Outcome: Ongoing, Progressing     Problem: Bleeding (Surgery Nonspecified)  Goal: Absence of Bleeding  Outcome: Ongoing, Progressing     Problem: Bowel Motility Impaired (Surgery Nonspecified)  Goal: Effective Bowel Elimination  Outcome: Ongoing, Progressing     Problem: Fluid and Electrolyte Imbalance (Surgery Nonspecified)  Goal: Fluid and Electrolyte Balance  Outcome: Ongoing, Progressing     Problem: Glycemic Control Impaired (Surgery Nonspecified)  Goal: Blood Glucose Level Within Targeted Range  Outcome: Ongoing, Progressing     Problem: Infection (Surgery Nonspecified)  Goal: Absence of Infection Signs and Symptoms  Outcome: Ongoing, Progressing     Problem: Ongoing Anesthesia Effects (Surgery Nonspecified)  Goal: Anesthesia/Sedation  Patient called back and she states "has not seen a Cardiologist in the past" Recovery  Outcome: Ongoing, Progressing     Problem: Pain (Surgery Nonspecified)  Goal: Acceptable Pain Control  Outcome: Ongoing, Progressing     Problem: Postoperative Nausea and Vomiting (Surgery Nonspecified)  Goal: Nausea and Vomiting Relief  Outcome: Ongoing, Progressing     Problem: Postoperative Urinary Retention (Surgery Nonspecified)  Goal: Effective Urinary Elimination  Outcome: Ongoing, Progressing     Problem: Respiratory Compromise (Surgery Nonspecified)  Goal: Effective Oxygenation and Ventilation  Outcome: Ongoing, Progressing     Problem: Oral Intake Inadequate  Goal: Improved Oral Intake  Outcome: Ongoing, Progressing

## 2023-09-23 NOTE — EICU
Intervention Initiated From:  [facility] / EICU    Miguel A intervened regarding:  Documentation    Nurse Notified:  No    Doctor Notified:  No    Comments: Video rounding completed. VS on cardiac monitor: P 107- ST, RR 12, Sa02 95%. PIV inserted 09/20 right lower FA # 20 angiocatheter. Midline right upper forearm. Both not due to be changed. NGT remains intact right nares. Patient reports feeling a lot better was able to sleep last night. HOB elevated. Female at bedside. NAD noted. WCTM as needed

## 2023-09-23 NOTE — NURSING
Patient given cup of ice, ok per Dr Li. When given the cup of ice patient.  Patient stated that Dr Li said he could have liquid to drink. I explained that I only heard Dr Li ok for cup of ice. Educated patient and his mother on importance of following MD orders of cup of ice only. Patient then requested something to drink again. Educated patient again that I will only follow MD orders. Patient stated that was joking with me, explained to patient that the way he talks was not in a joking matter.

## 2023-09-23 NOTE — NURSING
Received call from monitor room patient's oxygen reading at 72%. Woke patient up and asked to see his finger with the pulse ox he said he took it off, explained that we are monitoring him continuously that he needs to leave the pulse ox on his finger and the blood pressure on his arm. Placed pulse ox on finger and blood pressure cuff back on his arm. Patient verbalized understanding.

## 2023-09-23 NOTE — NURSING
Patient sitting in bedside chair, Mother passed patient a vape, patient placed between his leg and chair in attempt to hide. Asked mom what she passed to patient she stated that she did not pass him anything. Told patient and mom I saw what was passed, asked again what she passed him. After asking a 3rd time she admitted that she passed him a vape. I educated both patient and mom that the Vape is not allowed here. Also educated that Vaping delays healing process.  Patient become verbally loud stating that he wanted to get back in bed, mother attempted to help him get up from chair. Explained that staff are to help him get up from chair to avoid any falls or accidental removal of medical equipment such as NG tube. Educated both patient and mom on importance of keeping NG tube taped to nose and notifying staff it it becomes unsecured.    Notified Josefina CARRANZA House supervisor and security who was at nursing station.     Assisted patient back to bed with Logan WRIGHT.

## 2023-09-23 NOTE — NURSING
Patient removed tape from nose for NG tube, attempted to resecure NG tube, patient became upset and said leave it.

## 2023-09-23 NOTE — PT/OT/SLP EVAL
Physical Therapy Evaluation    Patient Name:  Brayden Francois Jr.   MRN:  [id number]    Recommendations:     Discharge Recommendations: home   Discharge Equipment Recommendations:  (TBD) may require walker    Assessment:     Brayden Francois Jr. is a 33 y.o. male admitted with a medical diagnosis of Perforated diverticulum.  He presents with the following impairments/functional limitations: weakness, impaired endurance, impaired balance, gait instability, impaired functional mobility, decreased lower extremity function  .    Rehab Prognosis: Good; patient would benefit from acute skilled PT services to address these deficits and reach maximum level of function.    Recent Surgery: Procedure(s) (LRB):  LAPAROTOMY, EXPLORATORY (N/A)  COLECTOMY, SIGMOID (N/A)  CREATION, COLOSTOMY (N/A)  ABDOMINAL WASHOUT (N/A) 3 Days Post-Op    Plan:     During this hospitalization, patient to be seen daily to address the identified rehab impairments via gait training, therapeutic activities, therapeutic exercises and progress toward the following goals:    Plan of Care Expires:  09/30/23    Subjective     Patient/Family Comments/goals: agrees to walk in [place], wants to walk 2nd lap    Living Environment:  House with parents  Prior to admission, patients level of function was independent without AD.  Equipment used at home: none.  DME owned (not currently used): none.  Upon discharge, patient will have assistance from family.    Objective:     Communicated with RN (Kya) prior to session.  Patient found HOB elevated with NG tube, peripheral IV, SCD, telemetry, blood pressure cuff, pulse ox (continuous), colostomy  upon PT entry to room.    General Precautions: Standard, fall  Orthopedic Precautions:N/A   Braces: N/A  Respiratory Status: Room air    Functional Mobility training:  Bed Mobility:     Rolling Left:  minimum assistance  Supine to Sit: minimum assistance  Transfers:     Sit to Stand:  minimum assistance with rolling walker  Gait:  250' x 2 with RW with SBA      AM-PAC 6 CLICK MOBILITY  Total Score:16       Treatment & Education:  Mobility training as above with cues for technique and safe use of walker    Patient left up in chair with  RN (Kya) notified and mother present. Instructed patient (mother present) to not attempt to transfer without calling for nurse.    GOALS:   Multidisciplinary Problems       Physical Therapy Goals          Problem: Physical Therapy    Goal Priority Disciplines Outcome Goal Variances Interventions   Physical Therapy Goal     PT, PT/OT Ongoing, Progressing     Description: Goals to be met by: 2023     Patient will increase functional independence with mobility by performin). Supine to sit with Modified [place]  2). Sit to supine with Modified [place]  3). Sit to stand transfer with Modified [place]  4). Gait  x > 200 feet with Modified [place]                          History:     Past Medical History:   Diagnosis Date    Psychoactive substance-induced psychosis        Past Surgical History:   Procedure Laterality Date    APPENDECTOMY      COLECTOMY, SIGMOID N/A 2023    Procedure: COLECTOMY, SIGMOID;  Surgeon: Homer Pascual MD;  Location: [facility] OR;  Service: General;  Laterality: N/A;    COLOSTOMY N/A 2023    Procedure: CREATION, COLOSTOMY;  Surgeon: Homer Pascual MD;  Location: [facility] OR;  Service: General;  Laterality: N/A;    FRACTURE SURGERY      LAPAROTOMY, EXPLORATORY N/A 2023    Procedure: LAPAROTOMY, EXPLORATORY;  Surgeon: Homer Pascual MD;  Location: [facility] OR;  Service: General;  Laterality: N/A;    TONSILLECTOMY      WASHOUT N/A 2023    Procedure: ABDOMINAL WASHOUT;  Surgeon: Homer Pascual MD;  Location: [facility] OR;  Service: General;  Laterality: N/A;       Time Tracking:     PT Received On: 23  PT Start Time: 905     PT Stop Time: 923  PT Total Time (min): 18 min     Billable Minutes: Evaluation 9 and Gait Training 9      2023

## 2023-09-23 NOTE — NURSING
Received report from Melly CARRANZA. Joselo CARRANZA in patients room securing NG tube to patient's nose.

## 2023-09-23 NOTE — PLAN OF CARE
09/22/23 2009   Patient Assessment/Suction   Level of Consciousness (AVPU) alert   Respiratory Effort Normal;Unlabored   Rhythm/Pattern, Respiratory pattern regular   PRE-TX-O2   Device (Oxygen Therapy) nasal cannula   Oxygen Concentration (%) 2   SpO2 98 %   Pulse Oximetry Type Continuous   Pulse 107   Resp 11   Incentive Spirometer   $ Incentive Spirometer Charges done with encouragement   Administration (IS) proper technique demonstrated   Number of Repetitions (IS) 10   Level Incentive Spirometer (mL) 1750   Patient Tolerance (IS) good;no adverse signs/symptoms present   Ready to Wean/Extubation Screen   FIO2<=50 (chart decimal) 0.02

## 2023-09-23 NOTE — DISCHARGE INSTRUCTIONS
Ostomy Care for home:  Ostomy Care:  Empty ostomy bag before going to bed at night.  Empty ostomy bag when up to the bathroom to urinate during the night.   Empty the ostomy bag when it is half full to avoid overfilled bag which may cause the bag to leak.    Ostomy Change two to three times a week as long as a good seal is obtained and no leaking occurs. NOTE a scheduled bag change should be first thing in the morning before the patient eats or drinks anything to avoid leakage while changing the device.   1. Gather supplies to change the ostomy such as a large towel, wet wash cloths, sting free adhesive remover, sting free skin barrier, strip paste(caulking), ostomy bag and ostomy scissors.  2. Apply sting free adhesive remover along the top of the ostomy bag while pealing back the bag from top to bottom and then throw this bag into the garbage.  3. Clean skin around the stoma with wash cloth. May use mild soap if needed and rinse and then dry this area.   4. Apply sting free skin barrier to the skin surrounding the stoma  5. Apply stoma stick paste  or ring paste to the skin at the base of the stoma   6. Apply cut bag to the area attaching this to the stoma strip paste and the skin.  7. Hold your hand over the ostomy adhesive area for 5 minutes to help obtain a good seal.     When to call your surgeon:  1. If there is no output from your stoma  2. If there is a large amount of bright red blood noted in the bag  3. If you experience severe pain that is a new pain around your stoma Ostomy Care:  Empty ostomy bag before going to bed at night.  Empty ostomy bag when up to the bathroom to urinate during the night.   Empty the ostomy bag when it is half full to avoid overfilled bag which may cause the ostomy to leak.

## 2023-09-23 NOTE — PLAN OF CARE
Problem: Physical Therapy  Goal: Physical Therapy Goal  Description: Goals to be met by: 2023     Patient will increase functional independence with mobility by performin). Supine to sit with Modified [place]  2). Sit to supine with Modified [place]  3). Sit to stand transfer with Modified [place]  4). Gait  x > 200 feet with Modified [place]     Outcome: Ongoing, Progressing      show

## 2023-09-23 NOTE — NURSING
Resecured NG tube to nose, Educated patient and mom of importance of keeping NG tube secured to nose to avoid it inadvertently being removed.  Both verbalized understanding.

## 2023-09-23 NOTE — CARE UPDATE
09/23/23 0726   Patient Assessment/Suction   Level of Consciousness (AVPU) alert   Respiratory Effort Normal;Unlabored   Expansion/Accessory Muscles/Retractions no use of accessory muscles;no retractions;expansion symmetric   All Lung Fields Breath Sounds clear   LLL Breath Sounds diminished   RLL Breath Sounds diminished   Rhythm/Pattern, Respiratory unlabored;pattern regular;depth regular;no shortness of breath reported   Cough Frequency no cough   PRE-TX-O2   Device (Oxygen Therapy) nasal cannula   $ Is the patient on Low Flow Oxygen? Yes   Oxygen Concentration (%) 2  (NC in L nostril)   SpO2 (!) 94 %   Pulse Oximetry Type Continuous   $ Pulse Oximetry - Multiple Charge Pulse Oximetry - Multiple   Pulse 109   Resp 18   Positioning HOB elevated 30 degrees   Incentive Spirometer   $ Incentive Spirometer Charges done with encouragement   Incentive Spirometer Predicted Level (mL) 2480   Administration (IS) mouthpiece utilized   Number of Repetitions (IS) 10   Level Incentive Spirometer (mL) 1200   Patient Tolerance (IS) good   Education   $ Education 15 min

## 2023-09-23 NOTE — NURSING
Pt sleeping with covers over head and refusing to keep blood pressure cuff on. Attempted to place cuff on arm patient asked that I not put it on and let me sleep.

## 2023-09-24 LAB
ALBUMIN SERPL BCP-MCNC: 2 G/DL (ref 3.5–5.2)
ALP SERPL-CCNC: 71 U/L (ref 55–135)
ALT SERPL W/O P-5'-P-CCNC: 17 U/L (ref 10–44)
ANION GAP SERPL CALC-SCNC: 10 MMOL/L (ref 8–16)
AST SERPL-CCNC: 25 U/L (ref 10–40)
BASOPHILS # BLD AUTO: 0.07 K/UL (ref 0–0.2)
BASOPHILS NFR BLD: 0.7 % (ref 0–1.9)
BILIRUB SERPL-MCNC: 0.4 MG/DL (ref 0.1–1)
BUN SERPL-MCNC: 14 MG/DL (ref 6–20)
CALCIUM SERPL-MCNC: 8.6 MG/DL (ref 8.7–10.5)
CHLORIDE SERPL-SCNC: 101 MMOL/L (ref 95–110)
CO2 SERPL-SCNC: 27 MMOL/L (ref 23–29)
CREAT SERPL-MCNC: 0.8 MG/DL (ref 0.5–1.4)
DIFFERENTIAL METHOD: ABNORMAL
EOSINOPHIL # BLD AUTO: 0.3 K/UL (ref 0–0.5)
EOSINOPHIL NFR BLD: 3.3 % (ref 0–8)
ERYTHROCYTE [DISTWIDTH] IN BLOOD BY AUTOMATED COUNT: 12.6 % (ref 11.5–14.5)
EST. GFR  (NO RACE VARIABLE): >60 ML/MIN/1.73 M^2
GLUCOSE SERPL-MCNC: 89 MG/DL (ref 70–110)
HCT VFR BLD AUTO: 27.4 % (ref 40–54)
HGB BLD-MCNC: 9.2 G/DL (ref 14–18)
IMM GRANULOCYTES # BLD AUTO: 0.18 K/UL (ref 0–0.04)
IMM GRANULOCYTES NFR BLD AUTO: 1.8 % (ref 0–0.5)
LYMPHOCYTES # BLD AUTO: 1.3 K/UL (ref 1–4.8)
LYMPHOCYTES NFR BLD: 13 % (ref 18–48)
MAGNESIUM SERPL-MCNC: 1.8 MG/DL (ref 1.6–2.6)
MCH RBC QN AUTO: 29.7 PG (ref 27–31)
MCHC RBC AUTO-ENTMCNC: 33.6 G/DL (ref 32–36)
MCV RBC AUTO: 88 FL (ref 82–98)
MONOCYTES # BLD AUTO: 1.3 K/UL (ref 0.3–1)
MONOCYTES NFR BLD: 13.2 % (ref 4–15)
NEUTROPHILS # BLD AUTO: 6.9 K/UL (ref 1.8–7.7)
NEUTROPHILS NFR BLD: 68 % (ref 38–73)
NRBC BLD-RTO: 0 /100 WBC
PLATELET # BLD AUTO: 281 K/UL (ref 150–450)
PMV BLD AUTO: 8.4 FL (ref 9.2–12.9)
POTASSIUM SERPL-SCNC: 3.3 MMOL/L (ref 3.5–5.1)
PROT SERPL-MCNC: 5.6 G/DL (ref 6–8.4)
RBC # BLD AUTO: 3.1 M/UL (ref 4.6–6.2)
SODIUM SERPL-SCNC: 138 MMOL/L (ref 136–145)
WBC # BLD AUTO: 10.19 K/UL (ref 3.9–12.7)

## 2023-09-24 PROCEDURE — 94799 UNLISTED PULMONARY SVC/PX: CPT

## 2023-09-24 PROCEDURE — 85025 COMPLETE CBC W/AUTO DIFF WBC: CPT | Performed by: NURSE PRACTITIONER

## 2023-09-24 PROCEDURE — 94761 N-INVAS EAR/PLS OXIMETRY MLT: CPT

## 2023-09-24 PROCEDURE — 63600175 PHARM REV CODE 636 W HCPCS: Performed by: NURSE PRACTITIONER

## 2023-09-24 PROCEDURE — 99900035 HC TECH TIME PER 15 MIN (STAT)

## 2023-09-24 PROCEDURE — 25000003 PHARM REV CODE 250: Performed by: HOSPITALIST

## 2023-09-24 PROCEDURE — 83735 ASSAY OF MAGNESIUM: CPT | Performed by: NURSE PRACTITIONER

## 2023-09-24 PROCEDURE — 36415 COLL VENOUS BLD VENIPUNCTURE: CPT | Performed by: NURSE PRACTITIONER

## 2023-09-24 PROCEDURE — 11000001 HC ACUTE MED/SURG PRIVATE ROOM

## 2023-09-24 PROCEDURE — 99900031 HC PATIENT EDUCATION (STAT)

## 2023-09-24 PROCEDURE — 25000003 PHARM REV CODE 250: Performed by: NURSE PRACTITIONER

## 2023-09-24 PROCEDURE — 80053 COMPREHEN METABOLIC PANEL: CPT | Performed by: NURSE PRACTITIONER

## 2023-09-24 PROCEDURE — 63600175 PHARM REV CODE 636 W HCPCS: Performed by: STUDENT IN AN ORGANIZED HEALTH CARE EDUCATION/TRAINING PROGRAM

## 2023-09-24 PROCEDURE — 97116 GAIT TRAINING THERAPY: CPT

## 2023-09-24 PROCEDURE — 63600175 PHARM REV CODE 636 W HCPCS: Performed by: HOSPITALIST

## 2023-09-24 RX ORDER — POTASSIUM CHLORIDE 7.45 MG/ML
40 INJECTION INTRAVENOUS
Status: DISCONTINUED | OUTPATIENT
Start: 2023-09-24 | End: 2023-09-26 | Stop reason: HOSPADM

## 2023-09-24 RX ORDER — POTASSIUM CHLORIDE 7.45 MG/ML
60 INJECTION INTRAVENOUS
Status: DISCONTINUED | OUTPATIENT
Start: 2023-09-24 | End: 2023-09-26 | Stop reason: HOSPADM

## 2023-09-24 RX ORDER — HYDROCODONE BITARTRATE AND ACETAMINOPHEN 5; 325 MG/1; MG/1
1 TABLET ORAL EVERY 4 HOURS PRN
Status: DISCONTINUED | OUTPATIENT
Start: 2023-09-24 | End: 2023-09-26 | Stop reason: HOSPADM

## 2023-09-24 RX ORDER — HYDROCODONE BITARTRATE AND ACETAMINOPHEN 10; 325 MG/1; MG/1
1 TABLET ORAL EVERY 4 HOURS PRN
Status: DISCONTINUED | OUTPATIENT
Start: 2023-09-24 | End: 2023-09-26 | Stop reason: HOSPADM

## 2023-09-24 RX ORDER — POTASSIUM CHLORIDE 7.45 MG/ML
80 INJECTION INTRAVENOUS
Status: DISCONTINUED | OUTPATIENT
Start: 2023-09-24 | End: 2023-09-26 | Stop reason: HOSPADM

## 2023-09-24 RX ADMIN — PIPERACILLIN AND TAZOBACTAM 4.5 G: 4; .5 INJECTION, POWDER, LYOPHILIZED, FOR SOLUTION INTRAVENOUS; PARENTERAL at 05:09

## 2023-09-24 RX ADMIN — DIVALPROEX SODIUM 250 MG: 250 TABLET, DELAYED RELEASE ORAL at 09:09

## 2023-09-24 RX ADMIN — HYDROMORPHONE HYDROCHLORIDE 1 MG: 2 INJECTION INTRAMUSCULAR; INTRAVENOUS; SUBCUTANEOUS at 09:09

## 2023-09-24 RX ADMIN — DIVALPROEX SODIUM 250 MG: 250 TABLET, DELAYED RELEASE ORAL at 08:09

## 2023-09-24 RX ADMIN — MIRTAZAPINE 15 MG: 15 TABLET, FILM COATED ORAL at 09:09

## 2023-09-24 RX ADMIN — PIPERACILLIN AND TAZOBACTAM 4.5 G: 4; .5 INJECTION, POWDER, LYOPHILIZED, FOR SOLUTION INTRAVENOUS; PARENTERAL at 09:09

## 2023-09-24 RX ADMIN — PIPERACILLIN AND TAZOBACTAM 4.5 G: 4; .5 INJECTION, POWDER, LYOPHILIZED, FOR SOLUTION INTRAVENOUS; PARENTERAL at 01:09

## 2023-09-24 RX ADMIN — HYDROCODONE BITARTRATE AND ACETAMINOPHEN 1 TABLET: 10; 325 TABLET ORAL at 01:09

## 2023-09-24 RX ADMIN — HYDROCODONE BITARTRATE AND ACETAMINOPHEN 1 TABLET: 10; 325 TABLET ORAL at 09:09

## 2023-09-24 RX ADMIN — DULOXETINE HYDROCHLORIDE 60 MG: 30 CAPSULE, DELAYED RELEASE ORAL at 08:09

## 2023-09-24 RX ADMIN — HYDROCODONE BITARTRATE AND ACETAMINOPHEN 1 TABLET: 10; 325 TABLET ORAL at 05:09

## 2023-09-24 RX ADMIN — DIVALPROEX SODIUM 250 MG: 250 TABLET, DELAYED RELEASE ORAL at 02:09

## 2023-09-24 RX ADMIN — RISPERIDONE 2 MG: 1 TABLET ORAL at 09:09

## 2023-09-24 RX ADMIN — DULOXETINE HYDROCHLORIDE 60 MG: 30 CAPSULE, DELAYED RELEASE ORAL at 09:09

## 2023-09-24 RX ADMIN — SODIUM CHLORIDE, POTASSIUM CHLORIDE, SODIUM LACTATE AND CALCIUM CHLORIDE: 600; 310; 30; 20 INJECTION, SOLUTION INTRAVENOUS at 04:09

## 2023-09-24 RX ADMIN — PANTOPRAZOLE SODIUM 40 MG: 40 TABLET, DELAYED RELEASE ORAL at 08:09

## 2023-09-24 RX ADMIN — Medication 800 MG: at 09:09

## 2023-09-24 RX ADMIN — MUPIROCIN: 20 OINTMENT TOPICAL at 08:09

## 2023-09-24 RX ADMIN — SODIUM CHLORIDE, POTASSIUM CHLORIDE, SODIUM LACTATE AND CALCIUM CHLORIDE: 600; 310; 30; 20 INJECTION, SOLUTION INTRAVENOUS at 03:09

## 2023-09-24 RX ADMIN — HYDROMORPHONE HYDROCHLORIDE 1 MG: 2 INJECTION INTRAMUSCULAR; INTRAVENOUS; SUBCUTANEOUS at 04:09

## 2023-09-24 RX ADMIN — Medication 800 MG: at 02:09

## 2023-09-24 NOTE — PLAN OF CARE
09/23/23 1959   Patient Assessment/Suction   Level of Consciousness (AVPU) alert   Respiratory Effort Normal;Unlabored   Rhythm/Pattern, Respiratory pattern regular   Cough Frequency no cough   PRE-TX-O2   Device (Oxygen Therapy) room air   SpO2 (!) 93 %   Pulse Oximetry Type Intermittent   Pulse 102   Resp 18   Incentive Spirometer   $ Incentive Spirometer Charges done with encouragement   Administration (IS) mouthpiece utilized   Number of Repetitions (IS) 8   Level Incentive Spirometer (mL) 1750   Patient Tolerance (IS) no adverse signs/symptoms present

## 2023-09-24 NOTE — ASSESSMENT & PLAN NOTE
Admit to step down  Status post sigmoid colectomy with colostomy creation with Dr. Pascual  Post op care as per his orders  Pain control   IV antiemetics  IV hydration   Continue zosyn  Needs ostomy teaching

## 2023-09-24 NOTE — SUBJECTIVE & OBJECTIVE
Interval History:  see hospital course     Review of Systems   Gastrointestinal:  Positive for abdominal pain. Negative for nausea and vomiting.     Objective:     Vital Signs (Most Recent):  Temp: 97.6 °F (36.4 °C) (09/23/23 2300)  Pulse: 108 (09/23/23 2300)  Resp: 16 (09/23/23 2300)  BP: 117/81 (09/23/23 2005)  SpO2: (!) 93 % (09/23/23 2005) Vital Signs (24h Range):  Temp:  [97.6 °F (36.4 °C)-99.7 °F (37.6 °C)] 97.6 °F (36.4 °C)  Pulse:  [] 108  Resp:  [10-25] 16  SpO2:  [88 %-99 %] 93 %  BP: (100-171)/(76-89) 117/81     Weight: 105.1 kg (231 lb 11.3 oz)  Body mass index is 34.22 kg/m².    Intake/Output Summary (Last 24 hours) at 9/24/2023 0047  Last data filed at 9/23/2023 2000  Gross per 24 hour   Intake 3990.87 ml   Output 2900 ml   Net 1090.87 ml           Physical Exam  Constitutional:       General: He is not in acute distress.     Appearance: He is well-developed. He is ill-appearing.   HENT:      Head: Normocephalic and atraumatic.   Eyes:      Pupils: Pupils are equal, round, and reactive to light.   Cardiovascular:      Rate and Rhythm: Normal rate and regular rhythm.      Heart sounds: No murmur heard.  Pulmonary:      Effort: Pulmonary effort is normal. No respiratory distress.      Breath sounds: Normal breath sounds. No wheezing or rales.   Abdominal:      General: There is no distension.      Palpations: Abdomen is soft.      Tenderness: There is abdominal tenderness (Diffuse, left side greater than right).      Comments: Ostomy   Musculoskeletal:         General: Normal range of motion.   Skin:     General: Skin is warm and dry.      Findings: No rash.      Comments: Multiple tattoos   Neurological:      Mental Status: He is alert and oriented to person, place, and time.      Cranial Nerves: No cranial nerve deficit.   Psychiatric:         Behavior: Behavior normal.             Significant Labs: All pertinent labs within the past 24 hours have been reviewed.    Significant Imaging: I have  reviewed all pertinent imaging results/findings within the past 24 hours.

## 2023-09-24 NOTE — PLAN OF CARE
Pt is alert and oriented x 4. Fall precautions maintained. Bed alarm on, call light within reach. Pt passing flatus on colostomy bag.  Problem: Adult Inpatient Plan of Care  Goal: Plan of Care Review  Outcome: Ongoing, Progressing     Problem: Adult Inpatient Plan of Care  Goal: Optimal Comfort and Wellbeing  Outcome: Ongoing, Progressing     Problem: Adult Inpatient Plan of Care  Goal: Absence of Hospital-Acquired Illness or Injury  Outcome: Ongoing, Progressing     Problem: Infection  Goal: Absence of Infection Signs and Symptoms  Outcome: Ongoing, Progressing  Problem: Fall Injury Risk  Goal: Absence of Fall and Fall-Related Injury  Outcome: Ongoing, Progressing     Problem: Bowel Motility Impaired (Surgery Nonspecified)  Goal: Effective Bowel Elimination  Outcome: Ongoing, Progressing

## 2023-09-24 NOTE — PT/OT/SLP PROGRESS
Physical Therapy Treatment    Patient Name:  Brayden Francois Jr.   MRN:  [id number]    Recommendations:     Discharge Recommendations: home  Discharge Equipment Recommendations:  (TBD)    Assessment:     Brayden Francois Jr. is a 33 y.o. male admitted with a medical diagnosis of Perforated diverticulum.  He presents with the following impairments/functional limitations: weakness, impaired endurance, pain, impaired balance, gait instability, impaired functional mobility, decreased lower extremity function  .    Rehab Prognosis: Good; patient would benefit from acute skilled PT services to address these deficits and reach maximum level of function.    Recent Surgery: Procedure(s) (LRB):  LAPAROTOMY, EXPLORATORY (N/A)  COLECTOMY, SIGMOID (N/A)  CREATION, COLOSTOMY (N/A)  ABDOMINAL WASHOUT (N/A) 4 Days Post-Op    Plan:     During this hospitalization, patient to be seen daily to address the identified rehab impairments via gait training, therapeutic activities, therapeutic exercises and progress toward the following goals:    Plan of Care Expires:  09/30/23    Subjective     Chief Complaint: abd pain  Patient/Family Comments/goals: agrees to walk in [place], wants to walk 3 laps  Pain/Comfort:  Pain Rating 1:  (not specific)  Location 1: abdomen  Pain Addressed 1: Reposition, Nurse notified, Cessation of Activity  Pain Rating Post-Intervention 1:  (not specific)      Objective:     Communicated with RN (Thea) prior to session.  Patient found up in chair with peripheral IV, telemetry, colostomy upon PT entry to room.     General Precautions: Standard, fall  Orthopedic Precautions: N/A  Braces: N/A  Respiratory Status: Room air     Functional Mobility training:  Bed Mobility: N/T due to found seated in chair  Transfers:     Sit to Stand:  stand by assistance with rolling walker  Gait: 750' with SBA (and assist for IV pole)      AM-PAC 6 CLICK MOBILITY          Treatment & Education:  Mobility training as above with cues for  technique  SEATED: LAQ, ankle DF/PD, x 10 reps each (rec'd to perform few times/day)    Patient left up in chair with all lines intact, call button in reach, and RN (Thea) notified.    GOALS:   Multidisciplinary Problems       Physical Therapy Goals          Problem: Physical Therapy    Goal Priority Disciplines Outcome Goal Variances Interventions   Physical Therapy Goal     PT, PT/OT Ongoing, Progressing     Description: Goals to be met by: 2023     Patient will increase functional independence with mobility by performin). Supine to sit with Modified [place]  2). Sit to supine with Modified [place]  3). Sit to stand transfer with Modified [place]  4). Gait  x > 200 feet with Modified [place]                          Time Tracking:     PT Received On: 23  PT Start Time: 1054     PT Stop Time: 1114  PT Total Time (min): 20 min     Billable Minutes: Gait Training 20    Treatment Type: Treatment  PT/PTA: PT     Number of PTA visits since last PT visit: 0     2023

## 2023-09-24 NOTE — NURSING
Patient informed of new room assignment. Patient declines to move at this time as his mother is asleep and he does not wish to wake her up. Patient is agreeable to changing rooms as soon as his mother wakes in the morning.

## 2023-09-24 NOTE — PLAN OF CARE
Pt started on clear liquids this morning. Pt drinking/eating jello very quickly and started to have abdominal pain. Educated pt that he should drink slowly as he will tolerate better. Encouraged him as he advances his diet to do very slowly. Assisted pt out of bed to walk. Pt walked 30 feet and then was placed into the bedside chair. Pt provided bedside tray with water, call light within reach and pts mother at bedside. Encouraged pt to stay out of the bed to assist with recovery and to call if needing to get up. Pt verbalized understanding.

## 2023-09-24 NOTE — PLAN OF CARE
Pain controlled throughout shift with PO PRN medication. IV fluids and IV antibiotics infusing per orders. Pt afebrile throughout shift with surgical dressing clean and dry, drainage noted, area marked. Pt on clear liquids throughout shift and asks for diet to be advanced but also states he still has pain when drinking clear liquids. Encouraged pt to clear liquids until tomorrow morning and slowly advance to full liquids. Pt and mother at bedside verbalized understanding. Ostomy output-large amounts of gas, small amount of dark stool. Safety maintained throughout shift. Call light in reach.     Problem: Adult Inpatient Plan of Care  Goal: Plan of Care Review  Outcome: Ongoing, Progressing  Goal: Patient-Specific Goal (Individualized)  Outcome: Ongoing, Progressing  Goal: Absence of Hospital-Acquired Illness or Injury  Outcome: Ongoing, Progressing  Goal: Optimal Comfort and Wellbeing  Outcome: Ongoing, Progressing  Goal: Readiness for Transition of Care  Outcome: Ongoing, Progressing     Problem: Infection  Goal: Absence of Infection Signs and Symptoms  Outcome: Ongoing, Progressing     Problem: Adjustment to Illness (Sepsis/Septic Shock)  Goal: Optimal Coping  Outcome: Ongoing, Progressing     Problem: Bleeding (Sepsis/Septic Shock)  Goal: Absence of Bleeding  Outcome: Ongoing, Progressing     Problem: Glycemic Control Impaired (Sepsis/Septic Shock)  Goal: Blood Glucose Level Within Desired Range  Outcome: Ongoing, Progressing     Problem: Infection Progression (Sepsis/Septic Shock)  Goal: Absence of Infection Signs and Symptoms  Outcome: Ongoing, Progressing     Problem: Nutrition Impaired (Sepsis/Septic Shock)  Goal: Optimal Nutrition Intake  Outcome: Ongoing, Progressing     Problem: Fall Injury Risk  Goal: Absence of Fall and Fall-Related Injury  Outcome: Ongoing, Progressing     Problem: Bleeding (Surgery Nonspecified)  Goal: Absence of Bleeding  Outcome: Ongoing, Progressing     Problem: Bowel Motility  Impaired (Surgery Nonspecified)  Goal: Effective Bowel Elimination  Outcome: Ongoing, Progressing     Problem: Fluid and Electrolyte Imbalance (Surgery Nonspecified)  Goal: Fluid and Electrolyte Balance  Outcome: Ongoing, Progressing     Problem: Glycemic Control Impaired (Surgery Nonspecified)  Goal: Blood Glucose Level Within Targeted Range  Outcome: Ongoing, Progressing     Problem: Infection (Surgery Nonspecified)  Goal: Absence of Infection Signs and Symptoms  Outcome: Ongoing, Progressing     Problem: Ongoing Anesthesia Effects (Surgery Nonspecified)  Goal: Anesthesia/Sedation Recovery  Outcome: Ongoing, Progressing     Problem: Pain (Surgery Nonspecified)  Goal: Acceptable Pain Control  Outcome: Ongoing, Progressing     Problem: Postoperative Nausea and Vomiting (Surgery Nonspecified)  Goal: Nausea and Vomiting Relief  Outcome: Ongoing, Progressing     Problem: Postoperative Urinary Retention (Surgery Nonspecified)  Goal: Effective Urinary Elimination  Outcome: Ongoing, Progressing     Problem: Respiratory Compromise (Surgery Nonspecified)  Goal: Effective Oxygenation and Ventilation  Outcome: Ongoing, Progressing     Problem: Oral Intake Inadequate  Goal: Improved Oral Intake  Outcome: Ongoing, Progressing

## 2023-09-24 NOTE — PROGRESS NOTES
POD 4 status post Kamla's procedure.  Patient resting comfortably.  Denies any nausea or vomiting.  Has been having output from his ostomy.  Requesting to advance diet.    Wt Readings from Last 3 Encounters:   09/22/23 105.1 kg (231 lb 11.3 oz)   05/24/23 84.4 kg (185 lb 15.7 oz)   05/24/23 84.4 kg (186 lb 1.1 oz)     Temp Readings from Last 3 Encounters:   09/24/23 98.9 °F (37.2 °C) (Oral)   05/24/23 98.9 °F (37.2 °C) (Oral)   04/23/23 98 °F (36.7 °C) (Oral)     BP Readings from Last 3 Encounters:   09/24/23 (!) 145/89   05/24/23 (!) 181/103   04/23/23 (!) 146/91     Pulse Readings from Last 3 Encounters:   09/24/23 99   05/24/23 (!) 123   04/23/23 95     AAox3  Sinus  Soft/nd/appt ttp    Lab Results   Component Value Date    WBC 10.19 09/24/2023    HGB 9.2 (L) 09/24/2023    HCT 27.4 (L) 09/24/2023    MCV 88 09/24/2023     09/24/2023       BMP  Lab Results   Component Value Date     09/24/2023    K 3.3 (L) 09/24/2023     09/24/2023    CO2 27 09/24/2023    BUN 14 09/24/2023    CREATININE 0.8 09/24/2023    CALCIUM 8.6 (L) 09/24/2023    ANIONGAP 10 09/24/2023    EGFRNORACEVR >60 09/24/2023     A/P: s/p Menard's  Ambulate  Adv diet as tolerated

## 2023-09-24 NOTE — CARE UPDATE
09/24/23 0812   Patient Assessment/Suction   Level of Consciousness (AVPU) responds to voice   Respiratory Effort Normal;Unlabored   Expansion/Accessory Muscles/Retractions no use of accessory muscles;no retractions;expansion symmetric   Rhythm/Pattern, Respiratory unlabored;pattern regular;depth regular;no shortness of breath reported   Cough Frequency no cough   PRE-TX-O2   Device (Oxygen Therapy) room air   SpO2 (!) 94 %   Pulse Oximetry Type Continuous   $ Pulse Oximetry - Multiple Charge Pulse Oximetry - Multiple   Pulse 99   Resp 16   Positioning HOB elevated 30 degrees;Left side   Education   $ Education 15 min

## 2023-09-24 NOTE — PROGRESS NOTES
"[facility] Medicine  Progress Note    Patient Name: Brayden Francois Jr.  MRN: [id number]  Patient Class: IP- Inpatient   Admission Date: 9/20/2023  Length of Stay: 4 days  Attending Physician: Sammie Castelan MD  Primary Care Provider: Ibis Rolle NP        Subjective:     Principal Problem:Perforated diverticulum        HPI:  Mr. Francois is a 33 year old male with a history of polysubstance abuse and amphetamine induced psychosis who presents today with complaints of right sided abd pain. It is severe. Pain radiates to his back and has been present for about a week. Symptoms were acutely worse this morning and associated with N/V and fever. He denies chest pain, SOB, dizziness, or LOC. Pt was seen briefly on the way to the OR and was AAOx4. In the ED, he is febrile at 100.2, tachycardic 135, normotensive. WBC 25K and CT abd/pelvis reveals: "Finding compatible with perforated diverticulitis of the sigmoid colon, with several small fluid collections in the lower abdomen and pelvis as above. A neoplastic process or inflammatory bowel disease are not excluded". Findings discussed with Dr. Pascual who plans to take to the OR now for repair. Hospital medicine is consulted for admission.      Overview/Hospital Course:  Patient was admitted with the hospital medicine service.  He was taken to the OR and underwent sigmoid colectomy with colostomy formation.  It was placed on IV antibiotics.  NG tube was kept in place postoperatively.  He was monitored for return of bowel function.    NGT removed. OStomy bag distended with air. Vitals stable; transfer out stepdown unit.       Interval History:  see hospital course     Review of Systems   Gastrointestinal:  Positive for abdominal pain. Negative for nausea and vomiting.     Objective:     Vital Signs (Most Recent):  Temp: 97.6 °F (36.4 °C) (09/23/23 2300)  Pulse: 108 (09/23/23 2300)  Resp: 16 (09/23/23 2300)  BP: 117/81 (09/23/23 " 2005)  SpO2: (!) 93 % (09/23/23 2005) Vital Signs (24h Range):  Temp:  [97.6 °F (36.4 °C)-99.7 °F (37.6 °C)] 97.6 °F (36.4 °C)  Pulse:  [] 108  Resp:  [10-25] 16  SpO2:  [88 %-99 %] 93 %  BP: (100-171)/(76-89) 117/81     Weight: 105.1 kg (231 lb 11.3 oz)  Body mass index is 34.22 kg/m².    Intake/Output Summary (Last 24 hours) at 9/24/2023 0047  Last data filed at 9/23/2023 2000  Gross per 24 hour   Intake 3990.87 ml   Output 2900 ml   Net 1090.87 ml           Physical Exam  Constitutional:       General: He is not in acute distress.     Appearance: He is well-developed. He is ill-appearing.   HENT:      Head: Normocephalic and atraumatic.   Eyes:      Pupils: Pupils are equal, round, and reactive to light.   Cardiovascular:      Rate and Rhythm: Normal rate and regular rhythm.      Heart sounds: No murmur heard.  Pulmonary:      Effort: Pulmonary effort is normal. No respiratory distress.      Breath sounds: Normal breath sounds. No wheezing or rales.   Abdominal:      General: There is no distension.      Palpations: Abdomen is soft.      Tenderness: There is abdominal tenderness (Diffuse, left side greater than right).      Comments: Ostomy   Musculoskeletal:         General: Normal range of motion.   Skin:     General: Skin is warm and dry.      Findings: No rash.      Comments: Multiple tattoos   Neurological:      Mental Status: He is alert and oriented to person, place, and time.      Cranial Nerves: No cranial nerve deficit.   Psychiatric:         Behavior: Behavior normal.             Significant Labs: All pertinent labs within the past 24 hours have been reviewed.    Significant Imaging: I have reviewed all pertinent imaging results/findings within the past 24 hours.      Assessment/Plan:      * Perforated diverticulum  Admit to step down  Status post sigmoid colectomy with colostomy creation with Dr. Pascual  Post op care as per his orders  Pain control   IV antiemetics  IV hydration   Continue  zosyn      Severe sepsis  This patient does have evidence of infective focus  My overall impression is sepsis.  Source: Abdominal  Antibiotics given-   Antibiotics (72h ago, onward)    Start     Stop Route Frequency Ordered    09/21/23 2200  piperacillin-tazobactam (ZOSYN) 4.5 g in dextrose 5 % in water (D5W) 100 mL IVPB (MB+)         -- IV Every 8 hours (non-standard times) 09/21/23 2150    09/21/23 2100  mupirocin 2 % ointment         09/26/23 2059 Nasl 2 times daily 09/21/23 1027        Latest lactate reviewed-  Recent Labs   Lab 09/20/23  1638   LACTATE 2.0     Organ dysfunction indicated by n/a    Fluid challenge Not needed - patient is not hypotensive      Post- resuscitation assessment No - Post resuscitation assessment not needed       Will Not start Pressors- Levophed for MAP of 65  Source control achieved by: surgical intervention and abx    Substance abuse with history of psychosis  Continue home psych medications         VTE Risk Mitigation (From admission, onward)         Ordered     IP VTE HIGH RISK PATIENT  Once         09/20/23 2303     Place sequential compression device  Until discontinued         09/20/23 2303                Discharge Planning   YOLANDA:      Code Status: Full Code   Is the patient medically ready for discharge?:     Reason for patient still in hospital (select all that apply): Patient trending condition  Discharge Plan A: Home with family                  Sammie Castelan MD  Department of Hospital Medicine   [facility]/Surg

## 2023-09-24 NOTE — SUBJECTIVE & OBJECTIVE
Interval History:  see hospital course     Review of Systems   Gastrointestinal:  Positive for abdominal pain. Negative for nausea and vomiting.     Objective:     Vital Signs (Most Recent):  Temp: 98.9 °F (37.2 °C) (09/24/23 0900)  Pulse: 110 (09/24/23 1158)  Resp: 16 (09/24/23 1158)  BP: (!) 152/94 (09/24/23 1158)  SpO2: 95 % (09/24/23 1158) Vital Signs (24h Range):  Temp:  [97.2 °F (36.2 °C)-98.9 °F (37.2 °C)] 98.9 °F (37.2 °C)  Pulse:  [] 110  Resp:  [12-25] 16  SpO2:  [93 %-96 %] 95 %  BP: (117-152)/(81-94) 152/94     Weight: 105.1 kg (231 lb 11.3 oz)  Body mass index is 34.22 kg/m².    Intake/Output Summary (Last 24 hours) at 9/24/2023 1217  Last data filed at 9/24/2023 1021  Gross per 24 hour   Intake 3990.87 ml   Output 2760 ml   Net 1230.87 ml           Physical Exam  Constitutional:       General: He is not in acute distress.     Appearance: He is well-developed. He is ill-appearing.   HENT:      Head: Normocephalic and atraumatic.   Eyes:      Pupils: Pupils are equal, round, and reactive to light.   Cardiovascular:      Rate and Rhythm: Normal rate and regular rhythm.      Heart sounds: No murmur heard.  Pulmonary:      Effort: Pulmonary effort is normal. No respiratory distress.      Breath sounds: Normal breath sounds. No wheezing or rales.   Abdominal:      General: There is no distension.      Palpations: Abdomen is soft.      Tenderness: There is abdominal tenderness (Diffuse, left side greater than right).      Comments: Ostomy   Musculoskeletal:         General: Normal range of motion.   Skin:     General: Skin is warm and dry.      Findings: No rash.      Comments: Multiple tattoos   Neurological:      Mental Status: He is alert and oriented to person, place, and time.      Cranial Nerves: No cranial nerve deficit.   Psychiatric:         Behavior: Behavior normal.             Significant Labs: All pertinent labs within the past 24 hours have been reviewed.    Significant Imaging: I have  reviewed all pertinent imaging results/findings within the past 24 hours.

## 2023-09-24 NOTE — PROGRESS NOTES
"[facility] Medicine  Progress Note    Patient Name: Brayden Francois Jr.  MRN: [id number]  Patient Class: IP- Inpatient   Admission Date: 9/20/2023  Length of Stay: 4 days  Attending Physician: Sammie Castelan MD  Primary Care Provider: Ibis Rolle NP        Subjective:     Principal Problem:Perforated diverticulum        HPI:  Mr. Francois is a 33 year old male with a history of polysubstance abuse and amphetamine induced psychosis who presents today with complaints of right sided abd pain. It is severe. Pain radiates to his back and has been present for about a week. Symptoms were acutely worse this morning and associated with N/V and fever. He denies chest pain, SOB, dizziness, or LOC. Pt was seen briefly on the way to the OR and was AAOx4. In the ED, he is febrile at 100.2, tachycardic 135, normotensive. WBC 25K and CT abd/pelvis reveals: "Finding compatible with perforated diverticulitis of the sigmoid colon, with several small fluid collections in the lower abdomen and pelvis as above. A neoplastic process or inflammatory bowel disease are not excluded". Findings discussed with Dr. Pascual who plans to take to the OR now for repair. Hospital medicine is consulted for admission.      Overview/Hospital Course:  Patient was admitted with the hospital medicine service.  He was taken to the OR and underwent sigmoid colectomy with colostomy formation.  It was placed on IV antibiotics.  NG tube was kept in place postoperatively.  He was monitored for return of bowel function.    NGT removed. OStomy bag distended with air. Vitals stable; transfer out stepdown unit. Diet advanced as tolerated. Discussed plan for oral pain meds; dc planning. Needs ostomy teaching.       Interval History:  see hospital course     Review of Systems   Gastrointestinal:  Positive for abdominal pain. Negative for nausea and vomiting.     Objective:     Vital Signs (Most Recent):  Temp: 98.9 °F (37.2 °C) " (09/24/23 0900)  Pulse: 110 (09/24/23 1158)  Resp: 16 (09/24/23 1158)  BP: (!) 152/94 (09/24/23 1158)  SpO2: 95 % (09/24/23 1158) Vital Signs (24h Range):  Temp:  [97.2 °F (36.2 °C)-98.9 °F (37.2 °C)] 98.9 °F (37.2 °C)  Pulse:  [] 110  Resp:  [12-25] 16  SpO2:  [93 %-96 %] 95 %  BP: (117-152)/(81-94) 152/94     Weight: 105.1 kg (231 lb 11.3 oz)  Body mass index is 34.22 kg/m².    Intake/Output Summary (Last 24 hours) at 9/24/2023 1217  Last data filed at 9/24/2023 1021  Gross per 24 hour   Intake 3990.87 ml   Output 2760 ml   Net 1230.87 ml           Physical Exam  Constitutional:       General: He is not in acute distress.     Appearance: He is well-developed. He is ill-appearing.   HENT:      Head: Normocephalic and atraumatic.   Eyes:      Pupils: Pupils are equal, round, and reactive to light.   Cardiovascular:      Rate and Rhythm: Normal rate and regular rhythm.      Heart sounds: No murmur heard.  Pulmonary:      Effort: Pulmonary effort is normal. No respiratory distress.      Breath sounds: Normal breath sounds. No wheezing or rales.   Abdominal:      General: There is no distension.      Palpations: Abdomen is soft.      Tenderness: There is abdominal tenderness (Diffuse, left side greater than right).      Comments: Ostomy   Musculoskeletal:         General: Normal range of motion.   Skin:     General: Skin is warm and dry.      Findings: No rash.      Comments: Multiple tattoos   Neurological:      Mental Status: He is alert and oriented to person, place, and time.      Cranial Nerves: No cranial nerve deficit.   Psychiatric:         Behavior: Behavior normal.             Significant Labs: All pertinent labs within the past 24 hours have been reviewed.    Significant Imaging: I have reviewed all pertinent imaging results/findings within the past 24 hours.      Assessment/Plan:      * Perforated diverticulum  Admit to step down  Status post sigmoid colectomy with colostomy creation with   Pascual  Post op care as per his orders  Pain control   IV antiemetics  IV hydration   Continue zosyn  Needs ostomy teaching       Severe sepsis  This patient does have evidence of infective focus  My overall impression is sepsis.  Source: Abdominal  Antibiotics given-   Antibiotics (72h ago, onward)    Start     Stop Route Frequency Ordered    09/21/23 2200  piperacillin-tazobactam (ZOSYN) 4.5 g in dextrose 5 % in water (D5W) 100 mL IVPB (MB+)         -- IV Every 8 hours (non-standard times) 09/21/23 2150 09/21/23 2100  mupirocin 2 % ointment         09/26/23 2059 Nasl 2 times daily 09/21/23 1027        Latest lactate reviewed-  Recent Labs   Lab 09/20/23  1638   LACTATE 2.0     Organ dysfunction indicated by n/a    Fluid challenge Not needed - patient is not hypotensive      Post- resuscitation assessment No - Post resuscitation assessment not needed       Will Not start Pressors- Levophed for MAP of 65  Source control achieved by: surgical intervention and abx    Substance abuse with history of psychosis  Continue home psych medications         VTE Risk Mitigation (From admission, onward)         Ordered     IP VTE HIGH RISK PATIENT  Once         09/20/23 2303     Place sequential compression device  Until discontinued         09/20/23 2303                Discharge Planning   YOLANDA:      Code Status: Full Code   Is the patient medically ready for discharge?:     Reason for patient still in hospital (select all that apply): Patient trending condition  Discharge Plan A: Home with family                  Sammie Castelan MD  Department of Hospital Medicine   [facility]/Surg

## 2023-09-25 LAB
ALBUMIN SERPL BCP-MCNC: 2 G/DL (ref 3.5–5.2)
ALP SERPL-CCNC: 80 U/L (ref 55–135)
ALT SERPL W/O P-5'-P-CCNC: 19 U/L (ref 10–44)
ANION GAP SERPL CALC-SCNC: 11 MMOL/L (ref 8–16)
AST SERPL-CCNC: 25 U/L (ref 10–40)
BACTERIA SPEC AEROBE CULT: NO GROWTH
BASOPHILS NFR BLD: 0 % (ref 0–1.9)
BILIRUB SERPL-MCNC: 0.3 MG/DL (ref 0.1–1)
BUN SERPL-MCNC: 9 MG/DL (ref 6–20)
CALCIUM SERPL-MCNC: 8.2 MG/DL (ref 8.7–10.5)
CHLORIDE SERPL-SCNC: 102 MMOL/L (ref 95–110)
CO2 SERPL-SCNC: 27 MMOL/L (ref 23–29)
CREAT SERPL-MCNC: 0.8 MG/DL (ref 0.5–1.4)
DIFFERENTIAL METHOD: ABNORMAL
EOSINOPHIL NFR BLD: 1 % (ref 0–8)
ERYTHROCYTE [DISTWIDTH] IN BLOOD BY AUTOMATED COUNT: 12.7 % (ref 11.5–14.5)
EST. GFR  (NO RACE VARIABLE): >60 ML/MIN/1.73 M^2
GLUCOSE SERPL-MCNC: 92 MG/DL (ref 70–110)
HCT VFR BLD AUTO: 27.7 % (ref 40–54)
HGB BLD-MCNC: 9.3 G/DL (ref 14–18)
IMM GRANULOCYTES # BLD AUTO: ABNORMAL K/UL
IMM GRANULOCYTES NFR BLD AUTO: ABNORMAL %
LYMPHOCYTES NFR BLD: 9 % (ref 18–48)
MAGNESIUM SERPL-MCNC: 1.8 MG/DL (ref 1.6–2.6)
MCH RBC QN AUTO: 29.5 PG (ref 27–31)
MCHC RBC AUTO-ENTMCNC: 33.6 G/DL (ref 32–36)
MCV RBC AUTO: 88 FL (ref 82–98)
METAMYELOCYTES NFR BLD MANUAL: 1 %
MONOCYTES NFR BLD: 13 % (ref 4–15)
NEUTROPHILS NFR BLD: 71 % (ref 38–73)
NEUTS BAND NFR BLD MANUAL: 5 %
NRBC BLD-RTO: 0 /100 WBC
PLATELET # BLD AUTO: 322 K/UL (ref 150–450)
PLATELET BLD QL SMEAR: ABNORMAL
PMV BLD AUTO: 8.4 FL (ref 9.2–12.9)
POTASSIUM SERPL-SCNC: 3.1 MMOL/L (ref 3.5–5.1)
PROT SERPL-MCNC: 5.3 G/DL (ref 6–8.4)
RBC # BLD AUTO: 3.15 M/UL (ref 4.6–6.2)
SODIUM SERPL-SCNC: 140 MMOL/L (ref 136–145)
WBC # BLD AUTO: 12.65 K/UL (ref 3.9–12.7)

## 2023-09-25 PROCEDURE — 63600175 PHARM REV CODE 636 W HCPCS: Performed by: NURSE PRACTITIONER

## 2023-09-25 PROCEDURE — 80053 COMPREHEN METABOLIC PANEL: CPT | Performed by: NURSE PRACTITIONER

## 2023-09-25 PROCEDURE — 85027 COMPLETE CBC AUTOMATED: CPT | Performed by: NURSE PRACTITIONER

## 2023-09-25 PROCEDURE — 25000003 PHARM REV CODE 250: Performed by: HOSPITALIST

## 2023-09-25 PROCEDURE — 63600175 PHARM REV CODE 636 W HCPCS: Performed by: HOSPITALIST

## 2023-09-25 PROCEDURE — 11000001 HC ACUTE MED/SURG PRIVATE ROOM

## 2023-09-25 PROCEDURE — 25000003 PHARM REV CODE 250: Performed by: NURSE PRACTITIONER

## 2023-09-25 PROCEDURE — 83735 ASSAY OF MAGNESIUM: CPT | Performed by: NURSE PRACTITIONER

## 2023-09-25 PROCEDURE — 85007 BL SMEAR W/DIFF WBC COUNT: CPT | Performed by: NURSE PRACTITIONER

## 2023-09-25 PROCEDURE — 36415 COLL VENOUS BLD VENIPUNCTURE: CPT | Performed by: NURSE PRACTITIONER

## 2023-09-25 PROCEDURE — 94761 N-INVAS EAR/PLS OXIMETRY MLT: CPT

## 2023-09-25 RX ADMIN — ACETAMINOPHEN 650 MG: 325 TABLET, FILM COATED ORAL at 04:09

## 2023-09-25 RX ADMIN — Medication 800 MG: at 12:09

## 2023-09-25 RX ADMIN — DULOXETINE HYDROCHLORIDE 60 MG: 30 CAPSULE, DELAYED RELEASE ORAL at 09:09

## 2023-09-25 RX ADMIN — SODIUM CHLORIDE, POTASSIUM CHLORIDE, SODIUM LACTATE AND CALCIUM CHLORIDE: 600; 310; 30; 20 INJECTION, SOLUTION INTRAVENOUS at 09:09

## 2023-09-25 RX ADMIN — RISPERIDONE 2 MG: 1 TABLET ORAL at 08:09

## 2023-09-25 RX ADMIN — ACETAMINOPHEN 650 MG: 325 TABLET, FILM COATED ORAL at 03:09

## 2023-09-25 RX ADMIN — Medication 800 MG: at 09:09

## 2023-09-25 RX ADMIN — PANTOPRAZOLE SODIUM 40 MG: 40 TABLET, DELAYED RELEASE ORAL at 09:09

## 2023-09-25 RX ADMIN — MIRTAZAPINE 15 MG: 15 TABLET, FILM COATED ORAL at 08:09

## 2023-09-25 RX ADMIN — HYDROCODONE BITARTRATE AND ACETAMINOPHEN 1 TABLET: 10; 325 TABLET ORAL at 03:09

## 2023-09-25 RX ADMIN — HYDROCODONE BITARTRATE AND ACETAMINOPHEN 1 TABLET: 10; 325 TABLET ORAL at 08:09

## 2023-09-25 RX ADMIN — POTASSIUM CHLORIDE 10 MEQ: 7.46 INJECTION, SOLUTION INTRAVENOUS at 07:09

## 2023-09-25 RX ADMIN — MUPIROCIN: 20 OINTMENT TOPICAL at 09:09

## 2023-09-25 RX ADMIN — PIPERACILLIN AND TAZOBACTAM 4.5 G: 4; .5 INJECTION, POWDER, LYOPHILIZED, FOR SOLUTION INTRAVENOUS; PARENTERAL at 03:09

## 2023-09-25 RX ADMIN — PIPERACILLIN AND TAZOBACTAM 4.5 G: 4; .5 INJECTION, POWDER, LYOPHILIZED, FOR SOLUTION INTRAVENOUS; PARENTERAL at 06:09

## 2023-09-25 RX ADMIN — HYDROCODONE BITARTRATE AND ACETAMINOPHEN 1 TABLET: 10; 325 TABLET ORAL at 07:09

## 2023-09-25 RX ADMIN — DIVALPROEX SODIUM 250 MG: 250 TABLET, DELAYED RELEASE ORAL at 08:09

## 2023-09-25 RX ADMIN — DIVALPROEX SODIUM 250 MG: 250 TABLET, DELAYED RELEASE ORAL at 09:09

## 2023-09-25 RX ADMIN — DIVALPROEX SODIUM 250 MG: 250 TABLET, DELAYED RELEASE ORAL at 03:09

## 2023-09-25 RX ADMIN — HYDROCODONE BITARTRATE AND ACETAMINOPHEN 1 TABLET: 10; 325 TABLET ORAL at 11:09

## 2023-09-25 RX ADMIN — SODIUM CHLORIDE, POTASSIUM CHLORIDE, SODIUM LACTATE AND CALCIUM CHLORIDE: 600; 310; 30; 20 INJECTION, SOLUTION INTRAVENOUS at 01:09

## 2023-09-25 RX ADMIN — PIPERACILLIN AND TAZOBACTAM 4.5 G: 4; .5 INJECTION, POWDER, LYOPHILIZED, FOR SOLUTION INTRAVENOUS; PARENTERAL at 09:09

## 2023-09-25 RX ADMIN — DULOXETINE HYDROCHLORIDE 60 MG: 30 CAPSULE, DELAYED RELEASE ORAL at 08:09

## 2023-09-25 RX ADMIN — Medication 800 MG: at 07:09

## 2023-09-25 NOTE — PT/OT/SLP PROGRESS
"Physical Therapy      Patient Name:  Brayden Francois Jr.   MRN:  [id number]    Patient not seen today secondary to Patient unwilling to participate (3 attempts . 1st attempt agreed to later. 2nd attempt requested to eat first, 3rd attempt ( 10:50), mother responded, " He is not ready to get up . They woke us up and people keep coming in ."  Nurse Nany informed of all attempts.). Will follow-up 9/26/23.    "

## 2023-09-25 NOTE — PROGRESS NOTES
Patient seen and examined.  Tolerating full liquids.  Having ostomy output.      Vitals are stable   Afebrile  Abdomen soft, appropriately tender   Ostomy is congested but still appears viable    Labs reviewed, stable     Overall doing well.  Diet was advanced to low residue.  If he tolerates, okay for DC later tonight versus tomorrow once ostomy teaching is complete.

## 2023-09-25 NOTE — PLAN OF CARE
Problem: Adult Inpatient Plan of Care  Goal: Plan of Care Review  Outcome: Ongoing, Not Progressing  Goal: Patient-Specific Goal (Individualized)  Outcome: Ongoing, Not Progressing  Goal: Absence of Hospital-Acquired Illness or Injury  Outcome: Ongoing, Not Progressing  Goal: Optimal Comfort and Wellbeing  Outcome: Ongoing, Not Progressing  Goal: Readiness for Transition of Care  Outcome: Ongoing, Not Progressing     Problem: Infection  Goal: Absence of Infection Signs and Symptoms  Outcome: Ongoing, Not Progressing     Problem: Adjustment to Illness (Sepsis/Septic Shock)  Goal: Optimal Coping  Outcome: Ongoing, Not Progressing     Problem: Bleeding (Sepsis/Septic Shock)  Goal: Absence of Bleeding  Outcome: Ongoing, Not Progressing     Problem: Glycemic Control Impaired (Sepsis/Septic Shock)  Goal: Blood Glucose Level Within Desired Range  Outcome: Ongoing, Not Progressing     Problem: Infection Progression (Sepsis/Septic Shock)  Goal: Absence of Infection Signs and Symptoms  Outcome: Ongoing, Not Progressing     Problem: Nutrition Impaired (Sepsis/Septic Shock)  Goal: Optimal Nutrition Intake  Outcome: Ongoing, Not Progressing     Problem: Fall Injury Risk  Goal: Absence of Fall and Fall-Related Injury  Outcome: Ongoing, Not Progressing     Problem: Bleeding (Surgery Nonspecified)  Goal: Absence of Bleeding  Outcome: Ongoing, Not Progressing     Problem: Bowel Motility Impaired (Surgery Nonspecified)  Goal: Effective Bowel Elimination  Outcome: Ongoing, Not Progressing     Problem: Fluid and Electrolyte Imbalance (Surgery Nonspecified)  Goal: Fluid and Electrolyte Balance  Outcome: Ongoing, Not Progressing     Problem: Glycemic Control Impaired (Surgery Nonspecified)  Goal: Blood Glucose Level Within Targeted Range  Outcome: Ongoing, Not Progressing     Problem: Infection (Surgery Nonspecified)  Goal: Absence of Infection Signs and Symptoms  Outcome: Ongoing, Not Progressing     Problem: Ongoing Anesthesia  Effects (Surgery Nonspecified)  Goal: Anesthesia/Sedation Recovery  Outcome: Ongoing, Not Progressing     Problem: Pain (Surgery Nonspecified)  Goal: Acceptable Pain Control  Outcome: Ongoing, Not Progressing     Problem: Postoperative Nausea and Vomiting (Surgery Nonspecified)  Goal: Nausea and Vomiting Relief  Outcome: Ongoing, Not Progressing     Problem: Postoperative Urinary Retention (Surgery Nonspecified)  Goal: Effective Urinary Elimination  Outcome: Ongoing, Not Progressing     Problem: Respiratory Compromise (Surgery Nonspecified)  Goal: Effective Oxygenation and Ventilation  Outcome: Ongoing, Not Progressing     Problem: Oral Intake Inadequate  Goal: Improved Oral Intake  Outcome: Ongoing, Not Progressing

## 2023-09-25 NOTE — NURSING
Patient given an extra dose of acetaminophen at this time for persistent headache. PAYTON Malone NP aware.

## 2023-09-25 NOTE — PLAN OF CARE
Problem: Adult Inpatient Plan of Care  Goal: Optimal Comfort and Wellbeing  Outcome: Ongoing, Progressing     Problem: Adult Inpatient Plan of Care  Goal: Readiness for Transition of Care  Outcome: Ongoing, Progressing     Problem: Infection  Goal: Absence of Infection Signs and Symptoms  Outcome: Ongoing, Progressing

## 2023-09-25 NOTE — PLAN OF CARE
09/24/23 1942   Patient Assessment/Suction   Level of Consciousness (AVPU) alert   Respiratory Effort Normal;Unlabored   Rhythm/Pattern, Respiratory pattern regular   Cough Frequency no cough   PRE-TX-O2   Device (Oxygen Therapy) room air   SpO2 96 %   Pulse Oximetry Type Intermittent   Incentive Spirometer   $ Incentive Spirometer Charges done with encouragement   Administration (IS) self-administered   Number of Repetitions (IS) 5   Level Incentive Spirometer (mL) 2000   Patient Tolerance (IS) no adverse signs/symptoms present

## 2023-09-26 ENCOUNTER — TELEPHONE (OUTPATIENT)
Dept: SURGERY | Facility: CLINIC | Age: 33
End: 2023-09-26
Payer: COMMERCIAL

## 2023-09-26 VITALS
SYSTOLIC BLOOD PRESSURE: 148 MMHG | BODY MASS INDEX: 34.32 KG/M2 | RESPIRATION RATE: 18 BRPM | DIASTOLIC BLOOD PRESSURE: 91 MMHG | OXYGEN SATURATION: 97 % | HEIGHT: 69 IN | WEIGHT: 231.69 LBS | TEMPERATURE: 98 F | HEART RATE: 88 BPM

## 2023-09-26 PROBLEM — E87.6 HYPOKALEMIA DUE TO EXCESSIVE GASTROINTESTINAL LOSS OF POTASSIUM: Status: RESOLVED | Noted: 2023-09-26 | Resolved: 2023-09-26

## 2023-09-26 PROBLEM — A41.9 SEVERE SEPSIS: Status: RESOLVED | Noted: 2023-09-20 | Resolved: 2023-09-26

## 2023-09-26 PROBLEM — E87.6 HYPOKALEMIA DUE TO EXCESSIVE GASTROINTESTINAL LOSS OF POTASSIUM: Status: ACTIVE | Noted: 2023-09-26

## 2023-09-26 PROBLEM — R65.20 SEVERE SEPSIS: Status: RESOLVED | Noted: 2023-09-20 | Resolved: 2023-09-26

## 2023-09-26 LAB
ALBUMIN SERPL BCP-MCNC: 2 G/DL (ref 3.5–5.2)
ALP SERPL-CCNC: 70 U/L (ref 55–135)
ALT SERPL W/O P-5'-P-CCNC: 17 U/L (ref 10–44)
ANION GAP SERPL CALC-SCNC: 9 MMOL/L (ref 8–16)
AST SERPL-CCNC: 19 U/L (ref 10–40)
BACTERIA BLD CULT: NORMAL
BACTERIA BLD CULT: NORMAL
BASOPHILS NFR BLD: 0 % (ref 0–1.9)
BILIRUB SERPL-MCNC: 0.3 MG/DL (ref 0.1–1)
BUN SERPL-MCNC: 6 MG/DL (ref 6–20)
CALCIUM SERPL-MCNC: 8.4 MG/DL (ref 8.7–10.5)
CHLORIDE SERPL-SCNC: 104 MMOL/L (ref 95–110)
CO2 SERPL-SCNC: 26 MMOL/L (ref 23–29)
CREAT SERPL-MCNC: 0.8 MG/DL (ref 0.5–1.4)
DIFFERENTIAL METHOD: ABNORMAL
EOSINOPHIL NFR BLD: 4 % (ref 0–8)
ERYTHROCYTE [DISTWIDTH] IN BLOOD BY AUTOMATED COUNT: 12.8 % (ref 11.5–14.5)
EST. GFR  (NO RACE VARIABLE): >60 ML/MIN/1.73 M^2
GLUCOSE SERPL-MCNC: 105 MG/DL (ref 70–110)
HCT VFR BLD AUTO: 28.5 % (ref 40–54)
HGB BLD-MCNC: 9.7 G/DL (ref 14–18)
IMM GRANULOCYTES # BLD AUTO: ABNORMAL K/UL (ref 0–0.04)
IMM GRANULOCYTES NFR BLD AUTO: ABNORMAL % (ref 0–0.5)
LYMPHOCYTES NFR BLD: 14 % (ref 18–48)
MAGNESIUM SERPL-MCNC: 1.9 MG/DL (ref 1.6–2.6)
MCH RBC QN AUTO: 29.9 PG (ref 27–31)
MCHC RBC AUTO-ENTMCNC: 34 G/DL (ref 32–36)
MCV RBC AUTO: 88 FL (ref 82–98)
METAMYELOCYTES NFR BLD MANUAL: 2 %
MONOCYTES NFR BLD: 12 % (ref 4–15)
MYELOCYTES NFR BLD MANUAL: 1 %
NEUTROPHILS NFR BLD: 65 % (ref 38–73)
NEUTS BAND NFR BLD MANUAL: 2 %
NRBC BLD-RTO: 0 /100 WBC
PLATELET # BLD AUTO: 334 K/UL (ref 150–450)
PLATELET BLD QL SMEAR: ABNORMAL
PMV BLD AUTO: 8.1 FL (ref 9.2–12.9)
POTASSIUM SERPL-SCNC: 3.7 MMOL/L (ref 3.5–5.1)
POTASSIUM SERPL-SCNC: 4.2 MMOL/L (ref 3.5–5.1)
PROT SERPL-MCNC: 5.4 G/DL (ref 6–8.4)
RBC # BLD AUTO: 3.24 M/UL (ref 4.6–6.2)
SODIUM SERPL-SCNC: 139 MMOL/L (ref 136–145)
WBC # BLD AUTO: 13.63 K/UL (ref 3.9–12.7)

## 2023-09-26 PROCEDURE — 85027 COMPLETE CBC AUTOMATED: CPT | Performed by: NURSE PRACTITIONER

## 2023-09-26 PROCEDURE — 83735 ASSAY OF MAGNESIUM: CPT | Performed by: NURSE PRACTITIONER

## 2023-09-26 PROCEDURE — 25000003 PHARM REV CODE 250: Performed by: HOSPITALIST

## 2023-09-26 PROCEDURE — 25000003 PHARM REV CODE 250: Performed by: NURSE PRACTITIONER

## 2023-09-26 PROCEDURE — 84132 ASSAY OF SERUM POTASSIUM: CPT | Performed by: HOSPITALIST

## 2023-09-26 PROCEDURE — 63600175 PHARM REV CODE 636 W HCPCS: Performed by: HOSPITALIST

## 2023-09-26 PROCEDURE — 36415 COLL VENOUS BLD VENIPUNCTURE: CPT | Performed by: HOSPITALIST

## 2023-09-26 PROCEDURE — 97116 GAIT TRAINING THERAPY: CPT | Mod: CQ

## 2023-09-26 PROCEDURE — 85007 BL SMEAR W/DIFF WBC COUNT: CPT | Performed by: NURSE PRACTITIONER

## 2023-09-26 PROCEDURE — 36415 COLL VENOUS BLD VENIPUNCTURE: CPT | Performed by: NURSE PRACTITIONER

## 2023-09-26 PROCEDURE — 63600175 PHARM REV CODE 636 W HCPCS: Performed by: NURSE PRACTITIONER

## 2023-09-26 PROCEDURE — 94761 N-INVAS EAR/PLS OXIMETRY MLT: CPT

## 2023-09-26 PROCEDURE — 99900035 HC TECH TIME PER 15 MIN (STAT)

## 2023-09-26 PROCEDURE — 80053 COMPREHEN METABOLIC PANEL: CPT | Performed by: NURSE PRACTITIONER

## 2023-09-26 RX ORDER — DULOXETIN HYDROCHLORIDE 60 MG/1
60 CAPSULE, DELAYED RELEASE ORAL 2 TIMES DAILY
Qty: 60 CAPSULE | Refills: 11 | Status: SHIPPED | OUTPATIENT
Start: 2023-09-26 | End: 2024-09-25

## 2023-09-26 RX ORDER — HYDROCODONE BITARTRATE AND ACETAMINOPHEN 10; 325 MG/1; MG/1
1 TABLET ORAL EVERY 4 HOURS PRN
Qty: 30 TABLET | Refills: 0 | Status: SHIPPED | OUTPATIENT
Start: 2023-09-26 | End: 2023-10-03

## 2023-09-26 RX ORDER — AMLODIPINE BESYLATE 5 MG/1
5 TABLET ORAL DAILY
Status: DISCONTINUED | OUTPATIENT
Start: 2023-09-26 | End: 2023-09-26 | Stop reason: HOSPADM

## 2023-09-26 RX ORDER — ONDANSETRON 4 MG/1
4 TABLET, ORALLY DISINTEGRATING ORAL EVERY 8 HOURS PRN
Qty: 21 TABLET | Refills: 0 | Status: SHIPPED | OUTPATIENT
Start: 2023-09-26 | End: 2023-10-03

## 2023-09-26 RX ADMIN — HYDROCODONE BITARTRATE AND ACETAMINOPHEN 1 TABLET: 10; 325 TABLET ORAL at 12:09

## 2023-09-26 RX ADMIN — DIVALPROEX SODIUM 250 MG: 250 TABLET, DELAYED RELEASE ORAL at 08:09

## 2023-09-26 RX ADMIN — SODIUM CHLORIDE, POTASSIUM CHLORIDE, SODIUM LACTATE AND CALCIUM CHLORIDE: 600; 310; 30; 20 INJECTION, SOLUTION INTRAVENOUS at 05:09

## 2023-09-26 RX ADMIN — PANTOPRAZOLE SODIUM 40 MG: 40 TABLET, DELAYED RELEASE ORAL at 08:09

## 2023-09-26 RX ADMIN — PIPERACILLIN AND TAZOBACTAM 4.5 G: 4; .5 INJECTION, POWDER, LYOPHILIZED, FOR SOLUTION INTRAVENOUS; PARENTERAL at 05:09

## 2023-09-26 RX ADMIN — HYDROCODONE BITARTRATE AND ACETAMINOPHEN 1 TABLET: 10; 325 TABLET ORAL at 08:09

## 2023-09-26 RX ADMIN — POTASSIUM BICARBONATE 50 MEQ: 978 TABLET, EFFERVESCENT ORAL at 06:09

## 2023-09-26 RX ADMIN — DIVALPROEX SODIUM 250 MG: 250 TABLET, DELAYED RELEASE ORAL at 03:09

## 2023-09-26 RX ADMIN — MUPIROCIN: 20 OINTMENT TOPICAL at 08:09

## 2023-09-26 RX ADMIN — DULOXETINE HYDROCHLORIDE 60 MG: 30 CAPSULE, DELAYED RELEASE ORAL at 08:09

## 2023-09-26 RX ADMIN — AMLODIPINE BESYLATE 5 MG: 5 TABLET ORAL at 10:09

## 2023-09-26 RX ADMIN — HYDROCODONE BITARTRATE AND ACETAMINOPHEN 1 TABLET: 10; 325 TABLET ORAL at 04:09

## 2023-09-26 NOTE — SUBJECTIVE & OBJECTIVE
Interval History:  see hospital course     Review of Systems   Gastrointestinal:  Positive for abdominal pain. Negative for nausea and vomiting.     Objective:     Vital Signs (Most Recent):  Temp: 98.6 °F (37 °C) (09/25/23 2359)  Pulse: 78 (09/25/23 2359)  Resp: (!) 21 (09/26/23 0025)  BP: (!) 171/96 (09/25/23 2359)  SpO2: 97 % (09/25/23 2359) Vital Signs (24h Range):  Temp:  [97.4 °F (36.3 °C)-99 °F (37.2 °C)] 98.6 °F (37 °C)  Pulse:  [78-99] 78  Resp:  [16-21] 21  SpO2:  [93 %-98 %] 97 %  BP: (139-171)/() 171/96     Weight: 105.1 kg (231 lb 11.3 oz)  Body mass index is 34.22 kg/m².    Intake/Output Summary (Last 24 hours) at 9/26/2023 0157  Last data filed at 9/25/2023 2307  Gross per 24 hour   Intake 3859.4 ml   Output 3300 ml   Net 559.4 ml           Physical Exam  Constitutional:       General: He is not in acute distress.     Appearance: He is well-developed. He is ill-appearing.   HENT:      Head: Normocephalic and atraumatic.   Eyes:      Pupils: Pupils are equal, round, and reactive to light.   Cardiovascular:      Rate and Rhythm: Normal rate and regular rhythm.      Heart sounds: No murmur heard.  Pulmonary:      Effort: Pulmonary effort is normal. No respiratory distress.      Breath sounds: Normal breath sounds. No wheezing or rales.   Abdominal:      General: There is no distension.      Palpations: Abdomen is soft.      Tenderness: There is abdominal tenderness (Diffuse, left side greater than right).      Comments: Ostomy   Musculoskeletal:         General: Normal range of motion.   Skin:     General: Skin is warm and dry.      Findings: No rash.      Comments: Multiple tattoos   Neurological:      Mental Status: He is alert and oriented to person, place, and time.      Cranial Nerves: No cranial nerve deficit.   Psychiatric:         Behavior: Behavior normal.             Significant Labs: All pertinent labs within the past 24 hours have been reviewed.    Significant Imaging: I have reviewed  all pertinent imaging results/findings within the past 24 hours.

## 2023-09-26 NOTE — PROGRESS NOTES
"Critical access hospital Medicine  Progress Note    Patient Name: Brayden Francois Jr.  MRN: [id number]  Patient Class: IP- Inpatient   Admission Date: 9/20/2023  Length of Stay: 6 days  Attending Physician: Sammie Castelan MD  Primary Care Provider: Ibis Rolle NP        Subjective:     Principal Problem:Perforated diverticulum        HPI:  Mr. Francois is a 33 year old male with a history of polysubstance abuse and amphetamine induced psychosis who presents today with complaints of right sided abd pain. It is severe. Pain radiates to his back and has been present for about a week. Symptoms were acutely worse this morning and associated with N/V and fever. He denies chest pain, SOB, dizziness, or LOC. Pt was seen briefly on the way to the OR and was AAOx4. In the ED, he is febrile at 100.2, tachycardic 135, normotensive. WBC 25K and CT abd/pelvis reveals: "Finding compatible with perforated diverticulitis of the sigmoid colon, with several small fluid collections in the lower abdomen and pelvis as above. A neoplastic process or inflammatory bowel disease are not excluded". Findings discussed with Dr. Pascual who plans to take to the OR now for repair. Hospital medicine is consulted for admission.      Overview/Hospital Course:  Patient was admitted with the hospital medicine service.  He was taken to the OR and underwent sigmoid colectomy with colostomy formation.  It was placed on IV antibiotics.  NG tube was kept in place postoperatively.  He was monitored for return of bowel function.    NGT removed. OStomy bag distended with air. Vitals stable; transfer out stepdown unit. Diet advanced as tolerated. Discussed plan for oral pain meds; dc planning. Needs ostomy teaching.     Received ostomy teaching with repeat teaching tomorrow. Pain not controlled per patient.       Interval History:  see hospital course     Review of Systems   Gastrointestinal:  Positive for abdominal pain. Negative for " nausea and vomiting.     Objective:     Vital Signs (Most Recent):  Temp: 98.6 °F (37 °C) (09/25/23 2359)  Pulse: 78 (09/25/23 2359)  Resp: (!) 21 (09/26/23 0025)  BP: (!) 171/96 (09/25/23 2359)  SpO2: 97 % (09/25/23 2359) Vital Signs (24h Range):  Temp:  [97.4 °F (36.3 °C)-99 °F (37.2 °C)] 98.6 °F (37 °C)  Pulse:  [78-99] 78  Resp:  [16-21] 21  SpO2:  [93 %-98 %] 97 %  BP: (139-171)/() 171/96     Weight: 105.1 kg (231 lb 11.3 oz)  Body mass index is 34.22 kg/m².    Intake/Output Summary (Last 24 hours) at 9/26/2023 0157  Last data filed at 9/25/2023 2307  Gross per 24 hour   Intake 3859.4 ml   Output 3300 ml   Net 559.4 ml           Physical Exam  Constitutional:       General: He is not in acute distress.     Appearance: He is well-developed. He is ill-appearing.   HENT:      Head: Normocephalic and atraumatic.   Eyes:      Pupils: Pupils are equal, round, and reactive to light.   Cardiovascular:      Rate and Rhythm: Normal rate and regular rhythm.      Heart sounds: No murmur heard.  Pulmonary:      Effort: Pulmonary effort is normal. No respiratory distress.      Breath sounds: Normal breath sounds. No wheezing or rales.   Abdominal:      General: There is no distension.      Palpations: Abdomen is soft.      Tenderness: There is abdominal tenderness (Diffuse, left side greater than right).      Comments: Ostomy   Musculoskeletal:         General: Normal range of motion.   Skin:     General: Skin is warm and dry.      Findings: No rash.      Comments: Multiple tattoos   Neurological:      Mental Status: He is alert and oriented to person, place, and time.      Cranial Nerves: No cranial nerve deficit.   Psychiatric:         Behavior: Behavior normal.             Significant Labs: All pertinent labs within the past 24 hours have been reviewed.    Significant Imaging: I have reviewed all pertinent imaging results/findings within the past 24 hours.      Assessment/Plan:      * Perforated diverticulum  Admit  to step down  Status post sigmoid colectomy with colostomy creation with Dr. Pascual  Post op care as per his orders  Pain control   IV antiemetics  IV hydration   Continue zosyn  Needs ostomy teaching - repeat in am with mother   DC HOME       Hypokalemia due to excessive gastrointestinal loss of potassium  Replace orally      Severe sepsis  This patient does have evidence of infective focus  My overall impression is sepsis.  Source: Abdominal  Antibiotics given-   Antibiotics (72h ago, onward)    Start     Stop Route Frequency Ordered    09/21/23 2200  piperacillin-tazobactam (ZOSYN) 4.5 g in dextrose 5 % in water (D5W) 100 mL IVPB (MB+)         -- IV Every 8 hours (non-standard times) 09/21/23 2150    09/21/23 2100  mupirocin 2 % ointment         09/26/23 2059 Nasl 2 times daily 09/21/23 1027        Latest lactate reviewed-  Recent Labs   Lab 09/20/23  1638   LACTATE 2.0     Organ dysfunction indicated by n/a    Fluid challenge Not needed - patient is not hypotensive      Post- resuscitation assessment No - Post resuscitation assessment not needed       Will Not start Pressors- Levophed for MAP of 65  Source control achieved by: surgical intervention and abx    Substance abuse with history of psychosis  Continue home psych medications         VTE Risk Mitigation (From admission, onward)         Ordered     IP VTE HIGH RISK PATIENT  Once         09/20/23 2303     Place sequential compression device  Until discontinued         09/20/23 2303                Discharge Planning   YOLANDA: 9/30/2023     Code Status: Full Code   Is the patient medically ready for discharge?:     Reason for patient still in hospital (select all that apply): Patient trending condition and Pending disposition  Discharge Plan A: Home with family                  Sammie Castelan MD  Department of Hospital Medicine   [place]/Surg

## 2023-09-26 NOTE — PLAN OF CARE
Recommendations  1) Continue Low fiber diet  2) Nutrition education and handout given-reviewed with mother  3) weigh weekly  4) Add Boost plus vanilla BID    Goals: 1) PO diet advanced to at least full liquids in < 5 days 2) Tolerating solid foods at f/u  Nutrition Goal Status: met/ new  Communication of RD Recs: poc,sticky note

## 2023-09-26 NOTE — PLAN OF CARE
Problem: Adult Inpatient Plan of Care  Goal: Plan of Care Review  Outcome: Met  Goal: Patient-Specific Goal (Individualized)  Outcome: Met  Goal: Absence of Hospital-Acquired Illness or Injury  Outcome: Met  Goal: Optimal Comfort and Wellbeing  Outcome: Met  Goal: Readiness for Transition of Care  Outcome: Met     Problem: Infection  Goal: Absence of Infection Signs and Symptoms  Outcome: Met     Problem: Adjustment to Illness (Sepsis/Septic Shock)  Goal: Optimal Coping  Outcome: Met     Problem: Bleeding (Sepsis/Septic Shock)  Goal: Absence of Bleeding  Outcome: Met     Problem: Glycemic Control Impaired (Sepsis/Septic Shock)  Goal: Blood Glucose Level Within Desired Range  Outcome: Met     Problem: Infection Progression (Sepsis/Septic Shock)  Goal: Absence of Infection Signs and Symptoms  Outcome: Met     Problem: Nutrition Impaired (Sepsis/Septic Shock)  Goal: Optimal Nutrition Intake  Outcome: Met     Problem: Fall Injury Risk  Goal: Absence of Fall and Fall-Related Injury  Outcome: Met     Problem: Bleeding (Surgery Nonspecified)  Goal: Absence of Bleeding  Outcome: Met     Problem: Bowel Motility Impaired (Surgery Nonspecified)  Goal: Effective Bowel Elimination  Outcome: Met     Problem: Fluid and Electrolyte Imbalance (Surgery Nonspecified)  Goal: Fluid and Electrolyte Balance  Outcome: Met     Problem: Glycemic Control Impaired (Surgery Nonspecified)  Goal: Blood Glucose Level Within Targeted Range  Outcome: Met     Problem: Infection (Surgery Nonspecified)  Goal: Absence of Infection Signs and Symptoms  Outcome: Met     Problem: Ongoing Anesthesia Effects (Surgery Nonspecified)  Goal: Anesthesia/Sedation Recovery  Outcome: Met     Problem: Pain (Surgery Nonspecified)  Goal: Acceptable Pain Control  Outcome: Met     Problem: Postoperative Nausea and Vomiting (Surgery Nonspecified)  Goal: Nausea and Vomiting Relief  Outcome: Met     Problem: Postoperative Urinary Retention (Surgery Nonspecified)  Goal:  Effective Urinary Elimination  Outcome: Met     Problem: Respiratory Compromise (Surgery Nonspecified)  Goal: Effective Oxygenation and Ventilation  Outcome: Met     Problem: Oral Intake Inadequate  Goal: Improved Oral Intake  Outcome: Met

## 2023-09-26 NOTE — ASSESSMENT & PLAN NOTE
Admit to step down  Status post sigmoid colectomy with colostomy creation with Dr. Pascual  Post op care as per his orders  Pain control   IV antiemetics  IV hydration   Continue zosyn  Needs ostomy teaching - repeat in am with mother   DC HOME

## 2023-09-26 NOTE — NURSING
Discharge instructions given to patient and mother. Patient and mother verbalizes understanding. PIV removed. Patient tolerated well. Left floor safely with all belongings.

## 2023-09-26 NOTE — PLAN OF CARE
Problem: Physical Therapy  Goal: Physical Therapy Goal  Description: Goals to be met by: 2023     Patient will increase functional independence with mobility by performin). Supine to sit with Modified [place]  2). Sit to supine with Modified [place]  3). Sit to stand transfer with Modified [place]  4). Gait  x > 200 feet with Modified [place]     Outcome: Ongoing, Progressing   Ambulate with assistance for safety.

## 2023-09-26 NOTE — NURSING
Ostomy care follow up this am. Offered to have patients mother to change ostomy bag while ostomy nurse present. Patient and patients mother both refused to do the colostomy change at this time. Patients mother has read all educational material and is able to verbally review steps to take when changing ostomy bag. Patient is cleared to discharge at this time from an ostomy nurse standpoint.

## 2023-09-26 NOTE — PLAN OF CARE
09/25/23 1937   Patient Assessment/Suction   Level of Consciousness (AVPU) alert   Respiratory Effort Normal;Unlabored   Rhythm/Pattern, Respiratory pattern regular   Cough Frequency no cough   PRE-TX-O2   Device (Oxygen Therapy) room air   SpO2 97 %   Pulse Oximetry Type Intermittent

## 2023-09-26 NOTE — PROGRESS NOTES
Patient seen and examined.  Doing well.  Tolerating a diet.      Vitals are stable   Afebrile  Abdomen soft, appropriately tender   Ostomy with stool output     Labs reviewed, very mild leukocytosis     Overall doing well.  Having bowel function.  Tolerating food.  Okay for DC.  Short course of antibiotics.  Follow up with me in 2 weeks.

## 2023-09-26 NOTE — PLAN OF CARE
Pt cleared for discharge from case management    Spoke to , Kacy, will send message to Dr. Pascual's staff to schedule HFU appt in 1 wk and will call pt with appt.    HFU appt scheduled w/ KB Martini for 10/2 at 1pm, on avs     09/26/23 1316   Final Note   Assessment Type Final Discharge Note   Anticipated Discharge Disposition Home   What phone number can be called within the next 1-3 days to see how you are doing after discharge?   ([phone])   Hospital Resources/Appts/Education Provided Appointments scheduled and added to AVS

## 2023-09-26 NOTE — CARE UPDATE
09/26/23 0803   Patient Assessment/Suction   Level of Consciousness (AVPU) alert   Respiratory Effort Unlabored;Normal   Expansion/Accessory Muscles/Retractions no use of accessory muscles;no retractions   Rhythm/Pattern, Respiratory unlabored;pattern regular;depth regular;no shortness of breath reported   PRE-TX-O2   Device (Oxygen Therapy) room air   SpO2 96 %   Pulse Oximetry Type Intermittent   $ Pulse Oximetry - Multiple Charge Pulse Oximetry - Multiple   Pulse 90   Resp 16   Incentive Spirometer   $ Incentive Spirometer Charges done independently per patient   Administration (IS) self-administered

## 2023-09-26 NOTE — PROGRESS NOTES
PRESTON acknowledged ostomy order; called [email or web address] [phone], spoke to Cata (Kristine not available) and was directed to fax order and clinicals to [phone].  Faxed.

## 2023-09-26 NOTE — TELEPHONE ENCOUNTER
I called and spoke to patients Mother to inform her that I scheduled his post op appointment on Thursday, 10/5/23 @ 10:45am with Dr. Pascual in [place].  Jeffry

## 2023-09-26 NOTE — PLAN OF CARE
Problem: Pain (Surgery Nonspecified)  Goal: Acceptable Pain Control  Outcome: Ongoing, Progressing     Problem: Adult Inpatient Plan of Care  Goal: Readiness for Transition of Care  Outcome: Ongoing, Progressing     Problem: Adult Inpatient Plan of Care  Goal: Optimal Comfort and Wellbeing  Outcome: Ongoing, Progressing     Pt. Awake and oriented x 4, no other complain aside from pain. Will continue to monitor.

## 2023-09-26 NOTE — PT/OT/SLP PROGRESS
Physical Therapy Treatment    Patient Name:  Brayden Francois Jr.   MRN:  [id number]    Recommendations:     Discharge Recommendations: home  Discharge Equipment Recommendations:  (TBD)  Barriers to discharge: None    Assessment:     Brayden Francois Jr. is a 33 y.o. male admitted with a medical diagnosis of Perforated diverticulum.  He presents with the following impairments/functional limitations: weakness, impaired endurance, impaired self care skills, impaired functional mobility, gait instability, pain . Awake, alert supine in bed. Agreed to mobilize.  Mother at bedside ,answers questions for son.   Transferred EOB with CGA. Gown applied.  Sit > stand with SBA.   Ambulated 250' with rw and CGA ( forward flexed due to discomfort).  Returned to room to chair at bedside.     Rehab Prognosis: Good; patient would benefit from acute skilled PT services to address these deficits and reach maximum level of function.    Recent Surgery: Procedure(s) (LRB):  LAPAROTOMY, EXPLORATORY (N/A)  COLECTOMY, SIGMOID (N/A)  CREATION, COLOSTOMY (N/A)  ABDOMINAL WASHOUT (N/A) 6 Days Post-Op    Plan:     During this hospitalization, patient to be seen daily to address the identified rehab impairments via gait training, therapeutic activities, therapeutic exercises and progress toward the following goals:    Plan of Care Expires:  09/30/23    Subjective     Chief Complaint: abdominal discomfort  Patient/Family Comments/goals: to return home  Pain/Comfort:  Pain Rating 1: other (see comments) (did not rate)  Location - Orientation 1: lower  Location 1: abdomen  Pain Addressed 1: Reposition, Nurse notified      Objective:     Communicated with nurse Woodward prior to session.  Patient found supine with PICC line, telemetry, colostomy upon PT entry to room.     General Precautions: Standard, fall  Orthopedic Precautions: N/A  Braces: N/A  Respiratory Status: Room air     Functional Mobility:  Bed Mobility:     Rolling Left:  stand by  assistance  Supine to Sit: contact guard assistance  Transfers:     Sit to Stand:  stand by assistance with rolling walker  Gait: 250' with rw and CGA      AM-PAC 6 CLICK MOBILITY          Treatment & Education:  Ambulated with rw and CGA for safety, forward flexed due to abdominal  discomfort.     Patient left up in chair with all lines intact, call button in reach, nurse Crissy notified, and mother present..    GOALS:   Multidisciplinary Problems       Physical Therapy Goals          Problem: Physical Therapy    Goal Priority Disciplines Outcome Goal Variances Interventions   Physical Therapy Goal     PT, PT/OT Ongoing, Progressing     Description: Goals to be met by: 2023     Patient will increase functional independence with mobility by performin). Supine to sit with Modified [place]  2). Sit to supine with Modified [place]  3). Sit to stand transfer with Modified [place]  4). Gait  x > 200 feet with Modified [place]                          Time Tracking:     PT Received On: 23  PT Start Time: 1000     PT Stop Time: 1012  PT Total Time (min): 12 min     Billable Minutes: Gait Training 12min    Treatment Type: Treatment  PT/PTA: PTA     Number of PTA visits since last PT visit: 2023

## 2023-09-26 NOTE — CONSULTS
Ostomy consulted initiated this am. Patient and patients mother at the bedside and educated regarding ostomy care needs and ostomy bag changes. Assessed stoma which measures 35mm x 50mm. Milli stoma skin intact. Scant brown stool noted in bag. The stoma has blood clots to the entire stoma. Educated the patient and his mother during the entire ostomy appliance change and answered questions regarding ostomy care. Patient and patients mother verbalized understanding. Handouts and colostomy booklet given to the patient. Patients mother cut out a new ostomy bag to practice and did with proficiency. Will follow up with patient and mother at the bedside tomorrow around 11am for mother to change ostomy appliance with supervision.       Left lower abdomen stoma measures 35mm x 55mm    Stoma    Midline incision site intact staples

## 2023-09-26 NOTE — TELEPHONE ENCOUNTER
----- Message from Kacy Ramon sent at 9/26/2023 12:54 PM CDT -----  Contact: [facility]  Type:  Sooner Appointment Request    Caller is requesting a sooner appointment.  Caller declined first available appointment listed below.  Caller will not accept being placed on the waitlist and is requesting a message be sent to doctor.    Name of Caller:  Dawn Jean  When is the first available appointment?  10/19/2023  Symptoms:  needs 1 week hosp f/up  Best Call Back Number:  [phone]  Additional Information:  Please call the patient back at the phone number listed above to advise. Thank you!

## 2023-09-27 ENCOUNTER — PATIENT OUTREACH (OUTPATIENT)
Dept: ADMINISTRATIVE | Facility: CLINIC | Age: 33
End: 2023-09-27
Payer: COMMERCIAL

## 2023-09-27 NOTE — PROGRESS NOTES
C3 nurse spoke with Brayden Francois Jr.  for a TCC post hospital discharge follow up call. The patient has a scheduled HOSFU appointment with Blanca Cain on 10/2/23 @ 1300.

## 2023-10-02 ENCOUNTER — TELEPHONE (OUTPATIENT)
Dept: SURGERY | Facility: CLINIC | Age: 33
End: 2023-10-02
Payer: COMMERCIAL

## 2023-10-02 NOTE — TELEPHONE ENCOUNTER
----- Message from Sulema Marina sent at 10/2/2023  2:33 PM CDT -----  Contact: sade (mom)  Type:  RX Refill Request    Who Called: Sade    Refill or New Rx:refill    RX Name and Strength:2 meds  HYDROcodone-acetaminophen (NORCO)  mg per tablet  ondansetron (ZOFRAN-ODT) 4 MG TbDL    How is the patient currently taking it? (ex. 1XDay):as directed    Is this a 30 day or 90 day RX:30    Preferred Pharmacy with phone number:  [facility] [place], [place] - [address] [facility]  [address] [address] [address]  Phone: [phone] Fax: [phone]    Local or Mail Order:local  Ordering Provider:Ankur  Would the patient rather a call back or a response via [facility]? Call back  Best Call Back Number:[phone]    Additional Information: please refill both above meds  Thanks

## 2023-10-03 ENCOUNTER — TELEPHONE (OUTPATIENT)
Dept: SURGERY | Facility: CLINIC | Age: 33
End: 2023-10-03
Payer: COMMERCIAL

## 2023-10-03 NOTE — TELEPHONE ENCOUNTER
----- Message from Mary Lou Gonzalez sent at 10/3/2023 11:17 AM CDT -----  Type: Needs Medical Advice  Who Called:  pt mom     Pharmacy name and phone #:    [facility] - [place] - [address]  [address]  [place] [address]  Phone: [phone] Fax: [phone]      Best Call Back Number: [phone]    Additional Information: pt mom calling in regards to message left , says script still not filled ad wants it filled today please advise

## 2023-10-03 NOTE — TELEPHONE ENCOUNTER
----- Message from Naresh Malave sent at 10/3/2023  3:05 PM CDT -----  Regarding: refill  Type:  RX Refill Request    Who Called: pt    Refill or New Rx:refill    RX Name and Strength:HYDROcodone-acetaminophen (NORCO)  mg per tablet 30 tablet 0 9/26/2023 10/3/2023   Sig - Route: Take 1 tablet by mouth every 4 (four) hours as needed for Pain. - Oral   Sent to pharmacy as: HYDROcodone-acetaminophen (NORCO)  mg per tablet   Earliest Fill Date: 9/26/2023         How is the patient currently taking it? (ex. 1XDay):see above    Is this a 30 day or 90 day RX:see above    Preferred Pharmacy with phone number:  [facility] - [place], [place] - [address] [facility]  [address] [address] [address]  Phone: [phone] Fax: [phone]        Local or Mail Order:Local    Ordering Provider:Ankur Arriaga Call Back Number:[phone]      Additional Information: Please call to discuss.

## 2023-10-05 ENCOUNTER — OFFICE VISIT (OUTPATIENT)
Dept: SURGERY | Facility: CLINIC | Age: 33
End: 2023-10-05
Payer: MEDICAID

## 2023-10-05 VITALS
SYSTOLIC BLOOD PRESSURE: 116 MMHG | BODY MASS INDEX: 31.87 KG/M2 | TEMPERATURE: 98 F | DIASTOLIC BLOOD PRESSURE: 91 MMHG | HEART RATE: 85 BPM | RESPIRATION RATE: 16 BRPM | WEIGHT: 215.19 LBS | HEIGHT: 69 IN

## 2023-10-05 DIAGNOSIS — Z98.890 POST-OPERATIVE STATE: Primary | ICD-10-CM

## 2023-10-05 PROCEDURE — 99999 PR PBB SHADOW E&M-EST. PATIENT-LVL IV: ICD-10-PCS | Mod: PBBFAC,,, | Performed by: STUDENT IN AN ORGANIZED HEALTH CARE EDUCATION/TRAINING PROGRAM

## 2023-10-05 PROCEDURE — 3044F PR MOST RECENT HEMOGLOBIN A1C LEVEL <7.0%: ICD-10-PCS | Mod: CPTII,,, | Performed by: STUDENT IN AN ORGANIZED HEALTH CARE EDUCATION/TRAINING PROGRAM

## 2023-10-05 PROCEDURE — 3074F PR MOST RECENT SYSTOLIC BLOOD PRESSURE < 130 MM HG: ICD-10-PCS | Mod: CPTII,,, | Performed by: STUDENT IN AN ORGANIZED HEALTH CARE EDUCATION/TRAINING PROGRAM

## 2023-10-05 PROCEDURE — 99024 POSTOP FOLLOW-UP VISIT: CPT | Mod: ,,, | Performed by: STUDENT IN AN ORGANIZED HEALTH CARE EDUCATION/TRAINING PROGRAM

## 2023-10-05 PROCEDURE — 99999 PR PBB SHADOW E&M-EST. PATIENT-LVL IV: CPT | Mod: PBBFAC,,, | Performed by: STUDENT IN AN ORGANIZED HEALTH CARE EDUCATION/TRAINING PROGRAM

## 2023-10-05 PROCEDURE — 3080F DIAST BP >= 90 MM HG: CPT | Mod: CPTII,,, | Performed by: STUDENT IN AN ORGANIZED HEALTH CARE EDUCATION/TRAINING PROGRAM

## 2023-10-05 PROCEDURE — 99214 OFFICE O/P EST MOD 30 MIN: CPT | Mod: PBBFAC,PN | Performed by: STUDENT IN AN ORGANIZED HEALTH CARE EDUCATION/TRAINING PROGRAM

## 2023-10-05 PROCEDURE — 1159F PR MEDICATION LIST DOCUMENTED IN MEDICAL RECORD: ICD-10-PCS | Mod: CPTII,,, | Performed by: STUDENT IN AN ORGANIZED HEALTH CARE EDUCATION/TRAINING PROGRAM

## 2023-10-05 PROCEDURE — 3074F SYST BP LT 130 MM HG: CPT | Mod: CPTII,,, | Performed by: STUDENT IN AN ORGANIZED HEALTH CARE EDUCATION/TRAINING PROGRAM

## 2023-10-05 PROCEDURE — 3080F PR MOST RECENT DIASTOLIC BLOOD PRESSURE >= 90 MM HG: ICD-10-PCS | Mod: CPTII,,, | Performed by: STUDENT IN AN ORGANIZED HEALTH CARE EDUCATION/TRAINING PROGRAM

## 2023-10-05 PROCEDURE — 3044F HG A1C LEVEL LT 7.0%: CPT | Mod: CPTII,,, | Performed by: STUDENT IN AN ORGANIZED HEALTH CARE EDUCATION/TRAINING PROGRAM

## 2023-10-05 PROCEDURE — 99024 PR POST-OP FOLLOW-UP VISIT: ICD-10-PCS | Mod: ,,, | Performed by: STUDENT IN AN ORGANIZED HEALTH CARE EDUCATION/TRAINING PROGRAM

## 2023-10-05 PROCEDURE — 1159F MED LIST DOCD IN RCRD: CPT | Mod: CPTII,,, | Performed by: STUDENT IN AN ORGANIZED HEALTH CARE EDUCATION/TRAINING PROGRAM

## 2023-10-05 RX ORDER — OXYCODONE AND ACETAMINOPHEN 5; 325 MG/1; MG/1
1 TABLET ORAL EVERY 4 HOURS PRN
Qty: 20 TABLET | Refills: 0 | Status: ON HOLD | OUTPATIENT
Start: 2023-10-05 | End: 2024-01-31 | Stop reason: CLARIF

## 2023-10-05 RX ORDER — ONDANSETRON 4 MG/1
4 TABLET, ORALLY DISINTEGRATING ORAL EVERY 6 HOURS PRN
Qty: 15 TABLET | Refills: 0 | Status: ON HOLD | OUTPATIENT
Start: 2023-10-05 | End: 2024-01-31 | Stop reason: CLARIF

## 2023-10-05 NOTE — PROGRESS NOTES
Postop note     Patient underwent Kamla's procedure for perforated sigmoid colitis.  Doing reasonably well.  Ostomy is functioning.  Small amount of drainage from the inferior aspect of his incision.      Incision is well approximated.  There is a small amount of drainage from the inferior aspect without significant erythema.    Ostomy with stool in the appliance     Pathology:  Perforated sigmoid colon     Overall, seems to be recovering well.  Refill of narcotics was sent to his pharmacy.  Discuss that this will be the last refill.  Follow up with me in 2 weeks for staple removal.

## 2023-10-13 NOTE — DISCHARGE SUMMARY
"[facility]  Discharge Summary      Patient Name: Brayden Francois Jr.  MRN: [id number]  HAROLDO: [id number]  Patient Class: IP- Inpatient  Admission Date: 9/20/2023  Hospital Length of Stay: 6 days  Discharge Date and Time: 09/26/2023  Attending Physician: No att. providers found   Discharging Provider: Sammie Castelan MD  Primary Care Provider: Ibis Rolle NP    Primary Care Team: Networked reference to record PCT     HPI:   Mr. Francois is a 33 year old male with a history of polysubstance abuse and amphetamine induced psychosis who presents today with complaints of right sided abd pain. It is severe. Pain radiates to his back and has been present for about a week. Symptoms were acutely worse this morning and associated with N/V and fever. He denies chest pain, SOB, dizziness, or LOC. Pt was seen briefly on the way to the OR and was AAOx4. In the ED, he is febrile at 100.2, tachycardic 135, normotensive. WBC 25K and CT abd/pelvis reveals: "Finding compatible with perforated diverticulitis of the sigmoid colon, with several small fluid collections in the lower abdomen and pelvis as above. A neoplastic process or inflammatory bowel disease are not excluded". Findings discussed with Dr. Pascual who plans to take to the OR now for repair. Hospital medicine is consulted for admission.      Procedure(s) (LRB):  LAPAROTOMY, EXPLORATORY (N/A)  COLECTOMY, SIGMOID (N/A)  CREATION, COLOSTOMY (N/A)  ABDOMINAL WASHOUT (N/A)      Hospital Course:    Mr. Francois is a 33 year old male with a history of polysubstance abuse and amphetamine induced psychosis who presents today with complaints of right sided abd pain. It is severe. Pain radiates to his back and has been present for about a week. Symptoms were acutely worse this morning and associated with N/V and fever. He denies chest pain, SOB, dizziness, or LOC. Pt was seen briefly on the way to the OR and was AAOx4. In the ED, he is " "febrile at 100.2, tachycardic 135, normotensive. WBC 25K and CT abd/pelvis reveals: "Finding compatible with perforated diverticulitis of the sigmoid colon, with several small fluid collections in the lower abdomen and pelvis as above. A neoplastic process or inflammatory bowel disease are not excluded". Findings discussed with Dr. Pascual who plans to take to the OR now for repair. Hospital medicine is consulted for admission. He was taken to the OR and underwent sigmoid colectomy with colostomy formation.  It was placed on IV antibiotics.  NG tube was kept in place postoperatively.  He was monitored for return of bowel function.    NGT removed. OStomy bag distended with air. Vitals stable; transfer out stepdown unit. Diet advanced as tolerated. Discussed plan for oral pain meds; dc planning. Needs ostomy teaching.     Received ostomy teaching with repeat teaching tomorrow. Pain not controlled per patient. DC instructions:    Ostomy Care for home:  Ostomy Care:  Empty ostomy bag before going to bed at night.  Empty ostomy bag when up to the bathroom to urinate during the night.   Empty the ostomy bag when it is half full to avoid overfilled bag which may cause the bag to leak.     Ostomy Change two to three times a week as long as a good seal is obtained and no leaking occurs. NOTE a scheduled bag change should be first thing in the morning before the patient eats or drinks anything to avoid leakage while changing the device.   1. Gather supplies to change the ostomy such as a large towel, wet wash cloths, sting free adhesive remover, sting free skin barrier, strip paste(caulking), ostomy bag and ostomy scissors.  2. Apply sting free adhesive remover along the top of the ostomy bag while pealing back the bag from top to bottom and then throw this bag into the garbage.  3. Clean skin around the stoma with wash cloth. May use mild soap if needed and rinse and then dry this area.   4. Apply sting free skin barrier to " the skin surrounding the stoma  5. Apply stoma stick paste  or ring paste to the skin at the base of the stoma   6. Apply cut bag to the area attaching this to the stoma strip paste and the skin.  7. Hold your hand over the ostomy adhesive area for 5 minutes to help obtain a good seal.      When to call your surgeon:  1. If there is no output from your stoma  2. If there is a large amount of bright red blood noted in the bag  3. If you experience severe pain that is a new pain around your stoma Ostomy Care:  Empty ostomy bag before going to bed at night.  Empty ostomy bag when up to the bathroom to urinate during the night.   Empty the ostomy bag when it is half full to avoid overfilled bag which may cause the ostomy to leak.       Goals of Care Treatment Preferences:  Code Status: Full Code      Consults:   Consults (From admission, onward)        Status Ordering Provider     Inpatient consult to Midline team  Once        Provider:  (Not yet assigned)    Completed ISMAEL BONILLA     Inpatient consult to General Surgery  Once        Provider:  Homer Pascual MD    Completed JOVANY MENDES          No new Assessment & Plan notes have been filed under this hospital service since the last note was generated.  Service: Hospital Medicine    Final Active Diagnoses:    Diagnosis Date Noted POA    PRINCIPAL PROBLEM:  Perforated diverticulum [K57.80] 09/20/2023 Yes    Substance abuse with history of psychosis [F19.10] 05/27/2023 Yes      Problems Resolved During this Admission:    Diagnosis Date Noted Date Resolved POA    Hypokalemia due to excessive gastrointestinal loss of potassium [E87.6] 09/26/2023 09/26/2023 No    Severe sepsis [A41.9, R65.20] 09/20/2023 09/26/2023 Yes       Discharged Condition: good    Disposition: Home or Self Care    Follow Up:   Follow-up Information     Homer Pascual MD Follow up.    Specialty: General Surgery  Contact information:  [address]  [address]  [facility]  "[id number]  [phone]             Payton Rios FNP-C. Go to.    Specialty: Family Medicine  Why: APPOINTMENT:  October 2, 2023 at 1:00pm  Hospital Follow up  Contact information:  [address] [facility]  [address] [place] [address]  [phone]                       Patient Instructions:      OSTOMY SUPPLIES FOR HOME USE     Order Specific Question Answer Comments   Height: 5' 9" (1.753 m)    Weight: 105.1 kg (231 lb 11.3 oz)    Length of need (1-99 months): 6    Diagnosis Colostomy    1 piece system:Qty - Month 10     Reference Number Coloplast/ #[id number]    20 bags a month    Skin prep? Yes [employer] care/#[id number]   Odor eliminator? Yes Brava #[id number]   Adhesive remover? Yes [employer]/#[id number]   Barrier strips? Yes Brava #[id number]   Does patient have medical equipment at home? none      Diet Cardiac     Notify your health care provider if you experience any of the following:  temperature >100.4     Notify your health care provider if you experience any of the following:  persistent nausea and vomiting or diarrhea     Notify your health care provider if you experience any of the following:  severe uncontrolled pain     Activity as tolerated       Significant Diagnostic Studies: Labs: CMP No results for input(s): "NA", "K", "CL", "CO2", "GLU", "BUN", "CREATININE", "CALCIUM", "PROT", "ALBUMIN", "BILITOT", "ALKPHOS", "AST", "ALT", "ANIONGAP", "ESTGFRAFRICA", "EGFRNONAA" in the last 48 hours. and CBC No results for input(s): "WBC", "HGB", "HCT", "PLT" in the last 48 hours.    Pending Diagnostic Studies:     None         Medications:  Reconciled Home Medications:      Medication List      CONTINUE taking these medications    amLODIPine 10 MG tablet  Commonly known as: NORVASC  Take 10 mg by mouth once daily.     divalproex 250 MG EC tablet  Commonly known as: DEPAKOTE  Take 1 tablet (250 mg total) by mouth 3 (three) times daily.     DULoxetine 60 MG capsule  Commonly known as: CYMBALTA  Take 1 capsule (60 mg total) by " mouth 2 (two) times daily.     hydrOXYzine 100 MG capsule  Commonly known as: VISTARIL  Take 100 mg by mouth once.     meloxicam 7.5 MG tablet  Commonly known as: MOBIC  Take 7.5 mg by mouth once daily.     mirtazapine 15 MG tablet  Commonly known as: REMERON  Take 1 tablet (15 mg total) by mouth every evening.     omeprazole 20 MG capsule  Commonly known as: PRILOSEC  Take 20 mg by mouth once daily.     paliperidone 3 MG Tr24  Commonly known as: INVEGA  Take 3 mg by mouth every evening.     risperiDONE 2 MG tablet  Commonly known as: RISPERDAL  Take 1 tablet (2 mg total) by mouth every evening.     traZODone 50 MG tablet  Commonly known as: DESYREL  Take 1 tablet (50 mg total) by mouth nightly as needed for Insomnia.        ASK your doctor about these medications    HYDROcodone-acetaminophen  mg per tablet  Commonly known as: NORCO  Take 1 tablet by mouth every 4 (four) hours as needed for Pain.  Ask about: Should I take this medication?     ondansetron 4 MG Tbdl  Commonly known as: ZOFRAN-ODT  Take 1 tablet (4 mg total) by mouth every 8 (eight) hours as needed (nausea).  Ask about: Should I take this medication?            Indwelling Lines/Drains at time of discharge:   Lines/Drains/Airways     Drain  Duration                Colostomy 09/20/23 2336 Descending/sigmoid LLQ 22 days                Time spent on the discharge of patient: 60 minutes         Sammie Castelan MD  Department of Hospital Medicine  [facility]/Surg

## 2023-10-19 ENCOUNTER — OFFICE VISIT (OUTPATIENT)
Dept: SURGERY | Facility: CLINIC | Age: 33
End: 2023-10-19
Payer: MEDICAID

## 2023-10-19 VITALS — HEART RATE: 85 BPM | DIASTOLIC BLOOD PRESSURE: 101 MMHG | SYSTOLIC BLOOD PRESSURE: 134 MMHG | TEMPERATURE: 97 F

## 2023-10-19 DIAGNOSIS — Z90.49 S/P COLECTOMY: ICD-10-CM

## 2023-10-19 DIAGNOSIS — K57.80 PERFORATED DIVERTICULUM: Primary | ICD-10-CM

## 2023-10-19 DIAGNOSIS — Z98.890 POST-OPERATIVE STATE: Primary | ICD-10-CM

## 2023-10-19 PROCEDURE — 3080F PR MOST RECENT DIASTOLIC BLOOD PRESSURE >= 90 MM HG: ICD-10-PCS | Mod: CPTII,,, | Performed by: STUDENT IN AN ORGANIZED HEALTH CARE EDUCATION/TRAINING PROGRAM

## 2023-10-19 PROCEDURE — 99999 PR PBB SHADOW E&M-EST. PATIENT-LVL II: ICD-10-PCS | Mod: PBBFAC,,, | Performed by: STUDENT IN AN ORGANIZED HEALTH CARE EDUCATION/TRAINING PROGRAM

## 2023-10-19 PROCEDURE — 99212 OFFICE O/P EST SF 10 MIN: CPT | Mod: PBBFAC,PN | Performed by: STUDENT IN AN ORGANIZED HEALTH CARE EDUCATION/TRAINING PROGRAM

## 2023-10-19 PROCEDURE — 3075F PR MOST RECENT SYSTOLIC BLOOD PRESS GE 130-139MM HG: ICD-10-PCS | Mod: CPTII,,, | Performed by: STUDENT IN AN ORGANIZED HEALTH CARE EDUCATION/TRAINING PROGRAM

## 2023-10-19 PROCEDURE — 3075F SYST BP GE 130 - 139MM HG: CPT | Mod: CPTII,,, | Performed by: STUDENT IN AN ORGANIZED HEALTH CARE EDUCATION/TRAINING PROGRAM

## 2023-10-19 PROCEDURE — 99999 PR PBB SHADOW E&M-EST. PATIENT-LVL II: CPT | Mod: PBBFAC,,, | Performed by: STUDENT IN AN ORGANIZED HEALTH CARE EDUCATION/TRAINING PROGRAM

## 2023-10-19 PROCEDURE — 99024 POSTOP FOLLOW-UP VISIT: CPT | Mod: ,,, | Performed by: STUDENT IN AN ORGANIZED HEALTH CARE EDUCATION/TRAINING PROGRAM

## 2023-10-19 PROCEDURE — 3044F PR MOST RECENT HEMOGLOBIN A1C LEVEL <7.0%: ICD-10-PCS | Mod: CPTII,,, | Performed by: STUDENT IN AN ORGANIZED HEALTH CARE EDUCATION/TRAINING PROGRAM

## 2023-10-19 PROCEDURE — 3044F HG A1C LEVEL LT 7.0%: CPT | Mod: CPTII,,, | Performed by: STUDENT IN AN ORGANIZED HEALTH CARE EDUCATION/TRAINING PROGRAM

## 2023-10-19 PROCEDURE — 99024 PR POST-OP FOLLOW-UP VISIT: ICD-10-PCS | Mod: ,,, | Performed by: STUDENT IN AN ORGANIZED HEALTH CARE EDUCATION/TRAINING PROGRAM

## 2023-10-19 PROCEDURE — 3080F DIAST BP >= 90 MM HG: CPT | Mod: CPTII,,, | Performed by: STUDENT IN AN ORGANIZED HEALTH CARE EDUCATION/TRAINING PROGRAM

## 2023-10-19 RX ORDER — HYDROXYZINE PAMOATE 25 MG/1
25 CAPSULE ORAL
Status: ON HOLD | COMMUNITY
Start: 2023-10-11 | End: 2024-01-31 | Stop reason: CLARIF

## 2023-10-19 NOTE — PROGRESS NOTES
Postop note     This is a 2nd postop note.  Patient is doing well.      Staples were removed.  Overall, patient is doing well.  Will set up with GI for colonoscopy through his ostomy and the rectal pouch.  Tentatively will plan to see back in the office in 2-3 months to discuss reversal.

## 2023-10-23 LAB — FUNGUS SPEC CULT: NORMAL

## 2023-11-03 LAB
ACID FAST MOD KINY STN SPEC: NORMAL
MYCOBACTERIUM SPEC QL CULT: NORMAL

## 2023-11-07 ENCOUNTER — ANESTHESIA EVENT (OUTPATIENT)
Dept: ENDOSCOPY | Facility: HOSPITAL | Age: 33
End: 2023-11-07
Payer: MEDICAID

## 2023-11-07 ENCOUNTER — ANESTHESIA (OUTPATIENT)
Dept: ENDOSCOPY | Facility: HOSPITAL | Age: 33
End: 2023-11-07
Payer: MEDICAID

## 2023-11-07 ENCOUNTER — HOSPITAL ENCOUNTER (OUTPATIENT)
Facility: HOSPITAL | Age: 33
Discharge: HOME | End: 2023-11-07
Attending: INTERNAL MEDICINE | Admitting: INTERNAL MEDICINE
Payer: MEDICAID

## 2023-11-07 DIAGNOSIS — K57.92 DIVERTICULITIS: ICD-10-CM

## 2023-11-07 PROCEDURE — D9220A PRA ANESTHESIA: Mod: ANES,,, | Performed by: ANESTHESIOLOGY

## 2023-11-07 PROCEDURE — 45378 DIAGNOSTIC COLONOSCOPY: CPT | Performed by: INTERNAL MEDICINE

## 2023-11-07 PROCEDURE — 37000009 HC ANESTHESIA EA ADD 15 MINS: Performed by: INTERNAL MEDICINE

## 2023-11-07 PROCEDURE — 45378 DIAGNOSTIC COLONOSCOPY: CPT | Mod: ,,, | Performed by: INTERNAL MEDICINE

## 2023-11-07 PROCEDURE — 45378 PR COLONOSCOPY,DIAGNOSTIC: ICD-10-PCS | Mod: ,,, | Performed by: INTERNAL MEDICINE

## 2023-11-07 PROCEDURE — D9220A PRA ANESTHESIA: Mod: CRNA,,, | Performed by: NURSE ANESTHETIST, CERTIFIED REGISTERED

## 2023-11-07 PROCEDURE — 63600175 PHARM REV CODE 636 W HCPCS: Performed by: NURSE ANESTHETIST, CERTIFIED REGISTERED

## 2023-11-07 PROCEDURE — D9220A PRA ANESTHESIA: ICD-10-PCS | Mod: ANES,,, | Performed by: ANESTHESIOLOGY

## 2023-11-07 PROCEDURE — 25000003 PHARM REV CODE 250: Performed by: NURSE ANESTHETIST, CERTIFIED REGISTERED

## 2023-11-07 PROCEDURE — 37000008 HC ANESTHESIA 1ST 15 MINUTES: Performed by: INTERNAL MEDICINE

## 2023-11-07 PROCEDURE — 25000003 PHARM REV CODE 250: Performed by: INTERNAL MEDICINE

## 2023-11-07 PROCEDURE — D9220A PRA ANESTHESIA: ICD-10-PCS | Mod: CRNA,,, | Performed by: NURSE ANESTHETIST, CERTIFIED REGISTERED

## 2023-11-07 RX ORDER — LIDOCAINE HYDROCHLORIDE 10 MG/ML
INJECTION, SOLUTION EPIDURAL; INFILTRATION; INTRACAUDAL; PERINEURAL
Status: DISCONTINUED | OUTPATIENT
Start: 2023-11-07 | End: 2023-11-07

## 2023-11-07 RX ORDER — PROPOFOL 10 MG/ML
VIAL (ML) INTRAVENOUS
Status: DISCONTINUED | OUTPATIENT
Start: 2023-11-07 | End: 2023-11-07

## 2023-11-07 RX ORDER — DEXTROMETHORPHAN/PSEUDOEPHED 2.5-7.5/.8
DROPS ORAL
Status: DISCONTINUED | OUTPATIENT
Start: 2023-11-07 | End: 2023-11-07 | Stop reason: HOSPADM

## 2023-11-07 RX ORDER — SODIUM CHLORIDE 9 MG/ML
INJECTION, SOLUTION INTRAVENOUS CONTINUOUS
Status: DISCONTINUED | OUTPATIENT
Start: 2023-11-07 | End: 2023-11-07 | Stop reason: HOSPADM

## 2023-11-07 RX ADMIN — PROPOFOL 30 MG: 10 INJECTION, EMULSION INTRAVENOUS at 10:11

## 2023-11-07 RX ADMIN — PROPOFOL 120 MG: 10 INJECTION, EMULSION INTRAVENOUS at 09:11

## 2023-11-07 RX ADMIN — LIDOCAINE HYDROCHLORIDE 20 MG: 10 INJECTION, SOLUTION EPIDURAL; INFILTRATION; INTRACAUDAL; PERINEURAL at 09:11

## 2023-11-07 RX ADMIN — SODIUM CHLORIDE: 9 INJECTION, SOLUTION INTRAVENOUS at 09:11

## 2023-11-07 NOTE — ANESTHESIA PREPROCEDURE EVALUATION
11/07/2023  Brayden Francois Jr. is a 33 y.o., male.      Pre-op Assessment    I have reviewed the NPO Status.   I have reviewed the Medications.     Review of Systems  Anesthesia Hx:  No problems with previous Anesthesia    Cardiovascular:   Exercise tolerance: good Hypertension    Pulmonary:  Pulmonary Normal    Hepatic/GI:   Bowel Prep.    Neurological:   Seizures    Endocrine:  Endocrine Normal    Psych:   Psychiatric History          Physical Exam  General: Well nourished        Anesthesia Plan  Type of Anesthesia, risks & benefits discussed:    Anesthesia Type: Gen Natural Airway  Intra-op Monitoring Plan: Standard ASA Monitors  Induction:  IV  Informed Consent: Informed consent signed with the Patient and all parties understand the risks and agree with anesthesia plan.  All questions answered.   ASA Score: 2    Ready For Surgery From Anesthesia Perspective.     .

## 2023-11-07 NOTE — H&P
CC: Abnormal CT scan    33 year old male with above. States that symptoms are absent, no alleviating/exacerbating factors. No family history of colorectal CA. No personal history of polyps. No bleeding or weight loss.     ROS:  No headache, no fever/chills, no chest pain/SOB, no nausea/vomiting/diarrhea/constipation/GI bleeding/abdominal pain, no dysuria/hematuria.    VSSAF   Exam:   Alert and oriented x 3; no apparent distress   PERRLA, sclera anicteric  CV: Regular rate/rhythm, normal PMI   Lungs: Clear bilaterally with no wheeze/rales   Abdomen: Soft, NT/ND, normal bowel sounds   Ext: No cyanosis, clubbing     Impression:   As above    Plan:   Proceed with endoscopy. Further recs to follow.

## 2023-11-07 NOTE — PLAN OF CARE
Vss, austyn po fluids, denies pain, ambulates easily. IV removed, catheter intact. Discharge instructions provided and states understanding. States ready to go home.  Discharged from facility with family per wheelchair.

## 2023-11-07 NOTE — PROVATION PATIENT INSTRUCTIONS
Discharge Summary/Instructions after an Endoscopic Procedure  Patient Name: Brayden Francois  Patient MRN: [id number]  Patient YOB: 1990 Tuesday, November 7, 2023  True Randolph MD  Dear patient,  As a result of recent federal legislation (The Federal Cures Act), you may   receive lab or pathology results from your procedure in your [facility]   account before your physician is able to contact you. Your physician or   their representative will relay the results to you with their   recommendations at their soonest availability.  Thank you,  RESTRICTIONS:  During your procedure today, you received medications for sedation.  These   medications may affect your judgment, balance and coordination.  Therefore,   for 24 hours, you have the following restrictions:   - DO NOT drive a car, operate machinery, make legal/financial decisions,   sign important papers or drink alcohol.    ACTIVITY:  Today: no heavy lifting, straining or running due to procedural   sedation/anesthesia.  The following day: return to full activity including work.  DIET:  Eat and drink normally unless instructed otherwise.     TREATMENT FOR COMMON SIDE EFFECTS:  - Mild abdominal pain, nausea, belching, bloating or excessive gas:  rest,   eat lightly and use a heating pad.  - Sore Throat: treat with throat lozenges and/or gargle with warm salt   water.  - Because air was used during the procedure, expelling large amounts of air   from your rectum or belching is normal.  - If a bowel prep was taken, you may not have a bowel movement for 1-3 days.    This is normal.  SYMPTOMS TO WATCH FOR AND REPORT TO YOUR PHYSICIAN:  1. Abdominal pain or bloating, other than gas cramps.  2. Chest pain.  3. Back pain.  4. Signs of infection such as: chills or fever occurring within 24 hours   after the procedure.  5. Rectal bleeding, which would show as bright red, maroon, or black stools.   (A tablespoon of blood from the rectum is not serious, especially  if   hemorrhoids are present.)  6. Vomiting.  7. Weakness or dizziness.  GO DIRECTLY TO THE NEAREST EMERGENCY ROOM IF YOU HAVE ANY OF THE FOLLOWING:      Difficulty breathing              Chills and/or fever over 101 F   Persistent vomiting and/or vomiting blood   Severe abdominal pain   Severe chest pain   Black, tarry stools   Bleeding- more than one tablespoon   Any other symptom or condition that you feel may need urgent attention  Your doctor recommends these additional instructions:  If any biopsies were taken, your doctors clinic will contact you in 1 to 2   weeks with any results.  - Patient has a contact number available for emergencies.  The signs and   symptoms of potential delayed complications were discussed with the   patient.  Return to normal activities tomorrow.  Written discharge   instructions were provided to the patient.   - High fiber diet.   - Continue present medications.   - Repeat colonoscopy at age 46 for screening purposes.   - Discharge patient to home (ambulatory).   - Return to referring physician.  For questions, problems or results please call your physician - True Randolph MD at Work:  [phone].  [facility], EMERGENCY ROOM PHONE NUMBER: [phone]  IF A COMPLICATION OR EMERGENCY SITUATION ARISES AND YOU ARE UNABLE TO REACH   YOUR PHYSICIAN - GO DIRECTLY TO THE EMERGENCY ROOM.  True Randolph MD  11/7/2023 10:21:40 AM  This report has been verified and signed electronically.  Dear patient,  As a result of recent federal legislation (The Federal Cures Act), you may   receive lab or pathology results from your procedure in your [facility]   account before your physician is able to contact you. Your physician or   their representative will relay the results to you with their   recommendations at their soonest availability.  Thank you,  PROVATION

## 2023-11-07 NOTE — ANESTHESIA POSTPROCEDURE EVALUATION
Anesthesia Post Evaluation    Patient: Brayden Francois JrAntoine    Procedure(s) Performed: Procedure(s) (LRB):  COLONOSCOPY (N/A)    Final Anesthesia Type: general      Patient location during evaluation: PACU  Patient participation: Yes- Able to Participate  Level of consciousness: awake and alert and oriented  Post-procedure vital signs: reviewed and stable  Pain management: adequate  Airway patency: patent    PONV status at discharge: No PONV  Anesthetic complications: no      Cardiovascular status: blood pressure returned to baseline  Respiratory status: unassisted, spontaneous ventilation and room air  Hydration status: euvolemic  Follow-up not needed.          Vitals Value Taken Time   /93 11/07/23 1040   Temp 36.7 °C (98.1 °F) 11/07/23 1040   Pulse 82 11/07/23 1041   Resp 18 11/07/23 1040   SpO2 100 % 11/07/23 1041   Vitals shown include unvalidated device data.      Event Time   Out of Recovery 10:50:00         Pain/Hilary Score: Hilary Score: 10 (11/7/2023 10:40 AM)

## 2023-11-08 VITALS
RESPIRATION RATE: 18 BRPM | TEMPERATURE: 98 F | DIASTOLIC BLOOD PRESSURE: 93 MMHG | OXYGEN SATURATION: 99 % | HEART RATE: 80 BPM | SYSTOLIC BLOOD PRESSURE: 127 MMHG

## 2023-12-27 ENCOUNTER — TELEPHONE (OUTPATIENT)
Dept: SURGERY | Facility: CLINIC | Age: 33
End: 2023-12-27
Payer: MEDICAID

## 2023-12-27 NOTE — TELEPHONE ENCOUNTER
----- Message from Nancy Adams sent at 12/27/2023  4:13 PM CST -----  Contact: Mother  Type: Needs Medical Advice  Who Called:  Mother     Best Call Back Number: [phone]    Additional Information: Ostomy is hurting in his stomach, red and blistered. Please call.

## 2023-12-27 NOTE — TELEPHONE ENCOUNTER
Spoke with mother, states the patient is crying in pain that is under the ostomy and the stoma has changed in size.  The skin around the stoma is red and blistered.  Advised that an ill fitting bag, no barrier paste can allow leakage on the skin and the acidity of the stool causes the skin to become irritated.  She states they do not have the powder or barrier paste and she asked me to call the supplier.  I spoke with Kristine at [facility] and she advised they get a shipment of all supplies on the 27th of the month.  I am going to get the number to the ostomy nurse here at [facility] and have her contact them as it sounds like they need some re-enforcement on how to do ostomy care and alos to check the stoma size to see if a different bag is needed.  She is also asking for pain medication for him because he is c/o pain under the stoma in his abdomen.  Advised the he would need to go to the ER for evaluation to see what is causing the pain.  She voiced understanding.

## 2024-01-08 ENCOUNTER — TELEPHONE (OUTPATIENT)
Dept: SURGERY | Facility: CLINIC | Age: 34
End: 2024-01-08
Payer: MEDICAID

## 2024-01-08 NOTE — TELEPHONE ENCOUNTER
Spoke with mom, advised Dr Pascual has clinic in [place] this morning, offered appointment there but does not want to drive there  appointment booked Thursday, 1-11-24 at 1045 am.  New address and directions given.

## 2024-01-08 NOTE — TELEPHONE ENCOUNTER
----- Message from Bebe Ho sent at 1/8/2024  9:02 AM CST -----  Type: Needs Medical Advice  Who Called:  pts mother Sade Arriaga Call Back Number: [phone]  Additional Information: caller states pt is suppose to have an appt today but there isn't one scheduled in the system, pl call bk to advise thanks

## 2024-01-11 ENCOUNTER — LAB VISIT (OUTPATIENT)
Dept: LAB | Facility: HOSPITAL | Age: 34
End: 2024-01-11
Attending: STUDENT IN AN ORGANIZED HEALTH CARE EDUCATION/TRAINING PROGRAM
Payer: MEDICAID

## 2024-01-11 ENCOUNTER — OFFICE VISIT (OUTPATIENT)
Dept: SURGERY | Facility: CLINIC | Age: 34
End: 2024-01-11
Payer: MEDICAID

## 2024-01-11 VITALS — DIASTOLIC BLOOD PRESSURE: 92 MMHG | HEART RATE: 92 BPM | SYSTOLIC BLOOD PRESSURE: 126 MMHG | TEMPERATURE: 97 F

## 2024-01-11 DIAGNOSIS — Z93.3 COLOSTOMY IN PLACE: ICD-10-CM

## 2024-01-11 DIAGNOSIS — K57.80 PERFORATED DIVERTICULUM: ICD-10-CM

## 2024-01-11 DIAGNOSIS — K57.80 PERFORATED DIVERTICULUM: Primary | ICD-10-CM

## 2024-01-11 LAB
ALBUMIN SERPL BCP-MCNC: 4.2 G/DL (ref 3.5–5.2)
ALP SERPL-CCNC: 68 U/L (ref 55–135)
ALT SERPL W/O P-5'-P-CCNC: 11 U/L (ref 10–44)
ANION GAP SERPL CALC-SCNC: 9 MMOL/L (ref 8–16)
AST SERPL-CCNC: 12 U/L (ref 10–40)
BASOPHILS # BLD AUTO: 0.05 K/UL (ref 0–0.2)
BASOPHILS NFR BLD: 0.5 % (ref 0–1.9)
BILIRUB SERPL-MCNC: 0.2 MG/DL (ref 0.1–1)
BUN SERPL-MCNC: 15 MG/DL (ref 6–20)
CALCIUM SERPL-MCNC: 9.7 MG/DL (ref 8.7–10.5)
CHLORIDE SERPL-SCNC: 104 MMOL/L (ref 95–110)
CO2 SERPL-SCNC: 25 MMOL/L (ref 23–29)
CREAT SERPL-MCNC: 1 MG/DL (ref 0.5–1.4)
DIFFERENTIAL METHOD BLD: ABNORMAL
EOSINOPHIL # BLD AUTO: 0.2 K/UL (ref 0–0.5)
EOSINOPHIL NFR BLD: 1.4 % (ref 0–8)
ERYTHROCYTE [DISTWIDTH] IN BLOOD BY AUTOMATED COUNT: 13.6 % (ref 11.5–14.5)
EST. GFR  (NO RACE VARIABLE): >60 ML/MIN/1.73 M^2
GLUCOSE SERPL-MCNC: 114 MG/DL (ref 70–110)
HCT VFR BLD AUTO: 44.1 % (ref 40–54)
HGB BLD-MCNC: 14.2 G/DL (ref 14–18)
IMM GRANULOCYTES # BLD AUTO: 0.07 K/UL (ref 0–0.04)
IMM GRANULOCYTES NFR BLD AUTO: 0.6 % (ref 0–0.5)
LYMPHOCYTES # BLD AUTO: 2.8 K/UL (ref 1–4.8)
LYMPHOCYTES NFR BLD: 25.9 % (ref 18–48)
MCH RBC QN AUTO: 27.2 PG (ref 27–31)
MCHC RBC AUTO-ENTMCNC: 32.2 G/DL (ref 32–36)
MCV RBC AUTO: 85 FL (ref 82–98)
MONOCYTES # BLD AUTO: 1.2 K/UL (ref 0.3–1)
MONOCYTES NFR BLD: 10.6 % (ref 4–15)
NEUTROPHILS # BLD AUTO: 6.7 K/UL (ref 1.8–7.7)
NEUTROPHILS NFR BLD: 61 % (ref 38–73)
NRBC BLD-RTO: 0 /100 WBC
PLATELET # BLD AUTO: 362 K/UL (ref 150–450)
PMV BLD AUTO: 8.2 FL (ref 9.2–12.9)
POTASSIUM SERPL-SCNC: 4.4 MMOL/L (ref 3.5–5.1)
PROT SERPL-MCNC: 7.2 G/DL (ref 6–8.4)
RBC # BLD AUTO: 5.22 M/UL (ref 4.6–6.2)
SODIUM SERPL-SCNC: 138 MMOL/L (ref 136–145)
WBC # BLD AUTO: 10.89 K/UL (ref 3.9–12.7)

## 2024-01-11 PROCEDURE — 99999 PR PBB SHADOW E&M-EST. PATIENT-LVL IV: CPT | Mod: PBBFAC,,, | Performed by: STUDENT IN AN ORGANIZED HEALTH CARE EDUCATION/TRAINING PROGRAM

## 2024-01-11 PROCEDURE — 3074F SYST BP LT 130 MM HG: CPT | Mod: CPTII,,, | Performed by: STUDENT IN AN ORGANIZED HEALTH CARE EDUCATION/TRAINING PROGRAM

## 2024-01-11 PROCEDURE — 3080F DIAST BP >= 90 MM HG: CPT | Mod: CPTII,,, | Performed by: STUDENT IN AN ORGANIZED HEALTH CARE EDUCATION/TRAINING PROGRAM

## 2024-01-11 PROCEDURE — 80053 COMPREHEN METABOLIC PANEL: CPT | Performed by: STUDENT IN AN ORGANIZED HEALTH CARE EDUCATION/TRAINING PROGRAM

## 2024-01-11 PROCEDURE — 99213 OFFICE O/P EST LOW 20 MIN: CPT | Mod: S$PBB,,, | Performed by: STUDENT IN AN ORGANIZED HEALTH CARE EDUCATION/TRAINING PROGRAM

## 2024-01-11 PROCEDURE — 85025 COMPLETE CBC W/AUTO DIFF WBC: CPT | Performed by: STUDENT IN AN ORGANIZED HEALTH CARE EDUCATION/TRAINING PROGRAM

## 2024-01-11 PROCEDURE — 99214 OFFICE O/P EST MOD 30 MIN: CPT | Mod: PBBFAC,PN | Performed by: STUDENT IN AN ORGANIZED HEALTH CARE EDUCATION/TRAINING PROGRAM

## 2024-01-11 PROCEDURE — 36415 COLL VENOUS BLD VENIPUNCTURE: CPT | Performed by: STUDENT IN AN ORGANIZED HEALTH CARE EDUCATION/TRAINING PROGRAM

## 2024-01-11 RX ORDER — SODIUM CHLORIDE 9 MG/ML
INJECTION, SOLUTION INTRAVENOUS CONTINUOUS
Status: CANCELLED | OUTPATIENT
Start: 2024-01-31

## 2024-01-11 RX ORDER — DIVALPROEX SODIUM 500 MG/1
500 TABLET, DELAYED RELEASE ORAL 3 TIMES DAILY
COMMUNITY
Start: 2023-12-30

## 2024-01-11 RX ORDER — CEFAZOLIN SODIUM 2 G/50ML
2 SOLUTION INTRAVENOUS
Status: CANCELLED | OUTPATIENT
Start: 2024-01-31

## 2024-01-11 NOTE — PROGRESS NOTES
History & Physical    SUBJECTIVE:     History of Present Illness:  Patient is a 33 y.o. male presents in follow-up.  He previously underwent exploratory laparotomy with Kamla's procedure for perforated diverticulitis.  Patient is still smoking.  Complaining of some stoma site pain and mild prolapse.    Chief Complaint   Patient presents with    Other     Post op       Review of patient's allergies indicates:  No Known Allergies    Current Outpatient Medications   Medication Sig Dispense Refill    divalproex (DEPAKOTE) 500 MG TbEC Take 500 mg by mouth 3 (three) times daily.      amLODIPine (NORVASC) 10 MG tablet Take 10 mg by mouth once daily.      DULoxetine (CYMBALTA) 60 MG capsule Take 1 capsule (60 mg total) by mouth 2 (two) times daily. 60 capsule 11    hydrOXYzine (VISTARIL) 100 MG capsule Take 100 mg by mouth once.      hydrOXYzine pamoate (VISTARIL) 25 MG Cap Take 25 mg by mouth.      meloxicam (MOBIC) 7.5 MG tablet Take 7.5 mg by mouth once daily.      mirtazapine (REMERON) 15 MG tablet Take 1 tablet (15 mg total) by mouth every evening. 30 tablet 0    omeprazole (PRILOSEC) 20 MG capsule Take 20 mg by mouth once daily.      ondansetron (ZOFRAN-ODT) 4 MG TbDL Take 1 tablet (4 mg total) by mouth every 6 (six) hours as needed. 15 tablet 0    oxyCODONE-acetaminophen (PERCOCET) 5-325 mg per tablet Take 1 tablet by mouth every 4 (four) hours as needed for Pain. 20 tablet 0    paliperidone (INVEGA) 3 MG TR24 Take 3 mg by mouth every evening.      risperiDONE (RISPERDAL) 2 MG tablet Take 1 tablet (2 mg total) by mouth every evening. 30 tablet 0    traZODone (DESYREL) 50 MG tablet Take 1 tablet (50 mg total) by mouth nightly as needed for Insomnia. 10 tablet 0     No current facility-administered medications for this visit.       Past Medical History:   Diagnosis Date    Hypertension     Psychoactive substance-induced psychosis     Seizures      Past Surgical History:   Procedure Laterality Date    APPENDECTOMY       COLECTOMY, SIGMOID N/A 9/20/2023    Procedure: COLECTOMY, SIGMOID;  Surgeon: Homer Pascual MD;  Location: [facility] OR;  Service: General;  Laterality: N/A;    COLONOSCOPY N/A 11/7/2023    Procedure: COLONOSCOPY;  Surgeon: True Collier MD;  Location: [facility] ENDO;  Service: Endoscopy;  Laterality: N/A;    COLOSTOMY N/A 9/20/2023    Procedure: CREATION, COLOSTOMY;  Surgeon: Homer Pascual MD;  Location: [facility] OR;  Service: General;  Laterality: N/A;    FRACTURE SURGERY      LAPAROTOMY, EXPLORATORY N/A 9/20/2023    Procedure: LAPAROTOMY, EXPLORATORY;  Surgeon: Homer Pascual MD;  Location: [facility] OR;  Service: General;  Laterality: N/A;    TONSILLECTOMY      WASHOUT N/A 9/20/2023    Procedure: ABDOMINAL WASHOUT;  Surgeon: Homer Pascual MD;  Location: [facility] OR;  Service: General;  Laterality: N/A;     Family History   Problem Relation Age of Onset    Breast cancer Maternal Grandfather     Breast cancer Other      Social History     Tobacco Use    Smoking status: Every Day     Current packs/day: 1.00     Average packs/day: 1 pack/day for 12.0 years (12.0 ttl pk-yrs)     Types: Cigarettes    Smokeless tobacco: Former   Substance Use Topics    Alcohol use: No    Drug use: No        Review of Systems:  Review of Systems   Constitutional: Negative.  Negative for fatigue and fever.   HENT: Negative.     Eyes: Negative.    Respiratory: Negative.  Negative for shortness of breath.    Cardiovascular: Negative.  Negative for chest pain.   Gastrointestinal:  Positive for abdominal pain.   Endocrine: Negative.    Genitourinary: Negative.    Musculoskeletal: Negative.    Skin: Negative.    Allergic/Immunologic: Negative.    Neurological: Negative.    Hematological: Negative.    Psychiatric/Behavioral: Negative.         OBJECTIVE:     Vital Signs (Most Recent)  Temp: 97.2 °F (36.2 °C) (01/11/24 1112)  Pulse: 92 (01/11/24 1112)  BP: (!) 126/92 (01/11/24 1112)           Physical Exam:  Physical Exam  Constitutional:        General: He is not in acute distress.     Appearance: Normal appearance. He is not ill-appearing, toxic-appearing or diaphoretic.   HENT:      Head: Normocephalic.      Nose: Nose normal.   Eyes:      Conjunctiva/sclera: Conjunctivae normal.   Cardiovascular:      Rate and Rhythm: Normal rate and regular rhythm.   Pulmonary:      Effort: Pulmonary effort is normal.   Abdominal:      Palpations: Abdomen is soft.      Comments: Ostomy in the left lower quadrant.   Musculoskeletal:         General: Normal range of motion.      Cervical back: Normal range of motion.   Skin:     General: Skin is warm.   Neurological:      General: No focal deficit present.      Mental Status: He is alert.   Psychiatric:         Mood and Affect: Mood normal.           ASSESSMENT/PLAN:     33-year-old male who underwent Kamla's procedure for perforated diverticulitis.  He recently underwent colonoscopy without significant findings.  He presents today to discuss reversal.    PLAN:  Risks versus benefits of exploratory laparotomy lysis of adhesions colostomy takedown and colorectal anastomosis were all discussed.  Recommend smoking cessation over the next month to limit risk of operative complications.  Patient ultimately elected to pursue surgical intervention.  We will schedule surgery at the end of the month.

## 2024-01-11 NOTE — PATIENT INSTRUCTIONS
Your procedure has been scheduled at:  [facility]pre-admit nurse  [phone]  [facility].........................................................pre_admit  [phone]  [facility]  [address]    DAY   Wednesday    DATE   January 31, 2024       Someone from the hospital will call you the evening before surgery ( it may be after 5 pm) to let you know what time you need to be at the hospital for surgery.                                               1:  DO NOT EAT OR DRINK ANYTHING AFTER MIDNIGHT THE NIGHT BEFORE SURGERY.     2:  You will need to stop all blood thinners 7 days prior to surgery except Eliquis which is 48 hours.      3:  Pre-admit nurse will go over your medicines and let you know which ones not to take the morning of surgery    4:  Plan to have someone drive you home after you are released from the hospital.  You WILL NOT be able to drive yourself.    5:  If you have any questions or need to change your surgery date, please call Mercedez or Yahir  at [phone] or [phone]        AFTER SURGERY:    **You can shower 24-48 hours after surgery, incision can get wet but do not soak. You  may have bandages and steri strips or you may just have glue over the incision with no bandage.  The glue will peel away after a few days, steri strips you can remove after 1 week.   **After surgery you will get pain medication to take every 6 hours.  If you are not getting relief you can take the pain medicine every 4 hours and you can also take 2 tabs  if needed.  Also if you are able to take Ibuprofen you can take 600 mg every 6 hours.  Applying an ice pack for 15-20 minutes 3-4 times a day will be helpful.  **If you have not had a bowel movement within 3 days after surgery you may take a laxative of your choice.  ** Do not lift anything over 5-10 pounds.    You need to have a follow up appointment 2 weeks after surgery, call the office to  schedule or if you have questions or concerns.

## 2024-01-11 NOTE — H&P (VIEW-ONLY)
History & Physical    SUBJECTIVE:     History of Present Illness:  Patient is a 33 y.o. male presents in follow-up.  He previously underwent exploratory laparotomy with Kamla's procedure for perforated diverticulitis.  Patient is still smoking.  Complaining of some stoma site pain and mild prolapse.    Chief Complaint   Patient presents with    Other     Post op       Review of patient's allergies indicates:  No Known Allergies    Current Outpatient Medications   Medication Sig Dispense Refill    divalproex (DEPAKOTE) 500 MG TbEC Take 500 mg by mouth 3 (three) times daily.      amLODIPine (NORVASC) 10 MG tablet Take 10 mg by mouth once daily.      DULoxetine (CYMBALTA) 60 MG capsule Take 1 capsule (60 mg total) by mouth 2 (two) times daily. 60 capsule 11    hydrOXYzine (VISTARIL) 100 MG capsule Take 100 mg by mouth once.      hydrOXYzine pamoate (VISTARIL) 25 MG Cap Take 25 mg by mouth.      meloxicam (MOBIC) 7.5 MG tablet Take 7.5 mg by mouth once daily.      mirtazapine (REMERON) 15 MG tablet Take 1 tablet (15 mg total) by mouth every evening. 30 tablet 0    omeprazole (PRILOSEC) 20 MG capsule Take 20 mg by mouth once daily.      ondansetron (ZOFRAN-ODT) 4 MG TbDL Take 1 tablet (4 mg total) by mouth every 6 (six) hours as needed. 15 tablet 0    oxyCODONE-acetaminophen (PERCOCET) 5-325 mg per tablet Take 1 tablet by mouth every 4 (four) hours as needed for Pain. 20 tablet 0    paliperidone (INVEGA) 3 MG TR24 Take 3 mg by mouth every evening.      risperiDONE (RISPERDAL) 2 MG tablet Take 1 tablet (2 mg total) by mouth every evening. 30 tablet 0    traZODone (DESYREL) 50 MG tablet Take 1 tablet (50 mg total) by mouth nightly as needed for Insomnia. 10 tablet 0     No current facility-administered medications for this visit.       Past Medical History:   Diagnosis Date    Hypertension     Psychoactive substance-induced psychosis     Seizures      Past Surgical History:   Procedure Laterality Date    APPENDECTOMY       COLECTOMY, SIGMOID N/A 9/20/2023    Procedure: COLECTOMY, SIGMOID;  Surgeon: Homer Pascual MD;  Location: [facility] OR;  Service: General;  Laterality: N/A;    COLONOSCOPY N/A 11/7/2023    Procedure: COLONOSCOPY;  Surgeon: True Collier MD;  Location: [facility] ENDO;  Service: Endoscopy;  Laterality: N/A;    COLOSTOMY N/A 9/20/2023    Procedure: CREATION, COLOSTOMY;  Surgeon: Homer Pascual MD;  Location: [facility] OR;  Service: General;  Laterality: N/A;    FRACTURE SURGERY      LAPAROTOMY, EXPLORATORY N/A 9/20/2023    Procedure: LAPAROTOMY, EXPLORATORY;  Surgeon: Homer Pascual MD;  Location: [facility] OR;  Service: General;  Laterality: N/A;    TONSILLECTOMY      WASHOUT N/A 9/20/2023    Procedure: ABDOMINAL WASHOUT;  Surgeon: Homer Pascual MD;  Location: [facility] OR;  Service: General;  Laterality: N/A;     Family History   Problem Relation Age of Onset    Breast cancer Maternal Grandfather     Breast cancer Other      Social History     Tobacco Use    Smoking status: Every Day     Current packs/day: 1.00     Average packs/day: 1 pack/day for 12.0 years (12.0 ttl pk-yrs)     Types: Cigarettes    Smokeless tobacco: Former   Substance Use Topics    Alcohol use: No    Drug use: No        Review of Systems:  Review of Systems   Constitutional: Negative.  Negative for fatigue and fever.   HENT: Negative.     Eyes: Negative.    Respiratory: Negative.  Negative for shortness of breath.    Cardiovascular: Negative.  Negative for chest pain.   Gastrointestinal:  Positive for abdominal pain.   Endocrine: Negative.    Genitourinary: Negative.    Musculoskeletal: Negative.    Skin: Negative.    Allergic/Immunologic: Negative.    Neurological: Negative.    Hematological: Negative.    Psychiatric/Behavioral: Negative.         OBJECTIVE:     Vital Signs (Most Recent)  Temp: 97.2 °F (36.2 °C) (01/11/24 1112)  Pulse: 92 (01/11/24 1112)  BP: (!) 126/92 (01/11/24 1112)           Physical Exam:  Physical Exam  Constitutional:        General: He is not in acute distress.     Appearance: Normal appearance. He is not ill-appearing, toxic-appearing or diaphoretic.   HENT:      Head: Normocephalic.      Nose: Nose normal.   Eyes:      Conjunctiva/sclera: Conjunctivae normal.   Cardiovascular:      Rate and Rhythm: Normal rate and regular rhythm.   Pulmonary:      Effort: Pulmonary effort is normal.   Abdominal:      Palpations: Abdomen is soft.      Comments: Ostomy in the left lower quadrant.   Musculoskeletal:         General: Normal range of motion.      Cervical back: Normal range of motion.   Skin:     General: Skin is warm.   Neurological:      General: No focal deficit present.      Mental Status: He is alert.   Psychiatric:         Mood and Affect: Mood normal.           ASSESSMENT/PLAN:     33-year-old male who underwent Kamla's procedure for perforated diverticulitis.  He recently underwent colonoscopy without significant findings.  He presents today to discuss reversal.    PLAN:  Risks versus benefits of exploratory laparotomy lysis of adhesions colostomy takedown and colorectal anastomosis were all discussed.  Recommend smoking cessation over the next month to limit risk of operative complications.  Patient ultimately elected to pursue surgical intervention.  We will schedule surgery at the end of the month.

## 2024-01-26 ENCOUNTER — HOSPITAL ENCOUNTER (OUTPATIENT)
Dept: PREADMISSION TESTING | Facility: HOSPITAL | Age: 34
Discharge: HOME | End: 2024-01-26
Payer: MEDICAID

## 2024-01-26 DIAGNOSIS — Z01.818 PREOP TESTING: Primary | ICD-10-CM

## 2024-01-26 RX ORDER — ACETAMINOPHEN 325 MG/1
325 TABLET ORAL
COMMUNITY

## 2024-01-29 ENCOUNTER — PATIENT MESSAGE (OUTPATIENT)
Dept: SURGERY | Facility: CLINIC | Age: 34
End: 2024-01-29
Payer: MEDICAID

## 2024-01-29 ENCOUNTER — TELEPHONE (OUTPATIENT)
Dept: SURGERY | Facility: CLINIC | Age: 34
End: 2024-01-29
Payer: MEDICAID

## 2024-01-29 NOTE — TELEPHONE ENCOUNTER
Spoke to mother, pt not available provided bowel prep instruction and access per he instruction that she does all of pt's portal communication.  Sent written instruction.

## 2024-01-30 ENCOUNTER — ANESTHESIA EVENT (OUTPATIENT)
Dept: SURGERY | Facility: HOSPITAL | Age: 34
DRG: 345 | End: 2024-01-30
Payer: MEDICAID

## 2024-01-31 ENCOUNTER — HOSPITAL ENCOUNTER (INPATIENT)
Facility: HOSPITAL | Age: 34
LOS: 8 days | Discharge: HOME | DRG: 345 | End: 2024-02-08
Attending: STUDENT IN AN ORGANIZED HEALTH CARE EDUCATION/TRAINING PROGRAM | Admitting: STUDENT IN AN ORGANIZED HEALTH CARE EDUCATION/TRAINING PROGRAM
Payer: MEDICAID

## 2024-01-31 ENCOUNTER — ANESTHESIA (OUTPATIENT)
Dept: SURGERY | Facility: HOSPITAL | Age: 34
DRG: 345 | End: 2024-01-31
Payer: MEDICAID

## 2024-01-31 DIAGNOSIS — Z93.3 COLOSTOMY IN PLACE: ICD-10-CM

## 2024-01-31 DIAGNOSIS — K57.80 PERFORATED DIVERTICULUM: ICD-10-CM

## 2024-01-31 DIAGNOSIS — Z01.818 PREOP TESTING: ICD-10-CM

## 2024-01-31 DIAGNOSIS — Z98.890 HISTORY OF COLOSTOMY REVERSAL: Primary | ICD-10-CM

## 2024-01-31 PROCEDURE — 25000003 PHARM REV CODE 250: Performed by: ANESTHESIOLOGY

## 2024-01-31 PROCEDURE — 36000711: Performed by: STUDENT IN AN ORGANIZED HEALTH CARE EDUCATION/TRAINING PROGRAM

## 2024-01-31 PROCEDURE — 25000003 PHARM REV CODE 250: Performed by: STUDENT IN AN ORGANIZED HEALTH CARE EDUCATION/TRAINING PROGRAM

## 2024-01-31 PROCEDURE — 27000221 HC OXYGEN, UP TO 24 HOURS

## 2024-01-31 PROCEDURE — 63600175 PHARM REV CODE 636 W HCPCS: Performed by: STUDENT IN AN ORGANIZED HEALTH CARE EDUCATION/TRAINING PROGRAM

## 2024-01-31 PROCEDURE — 71000033 HC RECOVERY, INTIAL HOUR: Performed by: STUDENT IN AN ORGANIZED HEALTH CARE EDUCATION/TRAINING PROGRAM

## 2024-01-31 PROCEDURE — 0DSM0ZZ REPOSITION DESCENDING COLON, OPEN APPROACH: ICD-10-PCS | Performed by: STUDENT IN AN ORGANIZED HEALTH CARE EDUCATION/TRAINING PROGRAM

## 2024-01-31 PROCEDURE — 25000003 PHARM REV CODE 250: Performed by: NURSE ANESTHETIST, CERTIFIED REGISTERED

## 2024-01-31 PROCEDURE — 94761 N-INVAS EAR/PLS OXIMETRY MLT: CPT

## 2024-01-31 PROCEDURE — 63600175 PHARM REV CODE 636 W HCPCS: Performed by: NURSE ANESTHETIST, CERTIFIED REGISTERED

## 2024-01-31 PROCEDURE — 94799 UNLISTED PULMONARY SVC/PX: CPT | Mod: XB

## 2024-01-31 PROCEDURE — 0DJD8ZZ INSPECTION OF LOWER INTESTINAL TRACT, VIA NATURAL OR ARTIFICIAL OPENING ENDOSCOPIC: ICD-10-PCS | Performed by: STUDENT IN AN ORGANIZED HEALTH CARE EDUCATION/TRAINING PROGRAM

## 2024-01-31 PROCEDURE — 99900031 HC PATIENT EDUCATION (STAT)

## 2024-01-31 PROCEDURE — 25000003 PHARM REV CODE 250: Performed by: NURSE PRACTITIONER

## 2024-01-31 PROCEDURE — 36000710: Performed by: STUDENT IN AN ORGANIZED HEALTH CARE EDUCATION/TRAINING PROGRAM

## 2024-01-31 PROCEDURE — 63600175 PHARM REV CODE 636 W HCPCS: Performed by: ANESTHESIOLOGY

## 2024-01-31 PROCEDURE — 93005 ELECTROCARDIOGRAM TRACING: CPT

## 2024-01-31 PROCEDURE — 93010 ELECTROCARDIOGRAM REPORT: CPT | Mod: ,,, | Performed by: GENERAL PRACTICE

## 2024-01-31 PROCEDURE — 11000001 HC ACUTE MED/SURG PRIVATE ROOM

## 2024-01-31 PROCEDURE — 44626 REPAIR BOWEL OPENING: CPT | Mod: ,,, | Performed by: STUDENT IN AN ORGANIZED HEALTH CARE EDUCATION/TRAINING PROGRAM

## 2024-01-31 PROCEDURE — 37000009 HC ANESTHESIA EA ADD 15 MINS: Performed by: STUDENT IN AN ORGANIZED HEALTH CARE EDUCATION/TRAINING PROGRAM

## 2024-01-31 PROCEDURE — 37000008 HC ANESTHESIA 1ST 15 MINUTES: Performed by: STUDENT IN AN ORGANIZED HEALTH CARE EDUCATION/TRAINING PROGRAM

## 2024-01-31 PROCEDURE — 27201423 OPTIME MED/SURG SUP & DEVICES STERILE SUPPLY: Performed by: STUDENT IN AN ORGANIZED HEALTH CARE EDUCATION/TRAINING PROGRAM

## 2024-01-31 PROCEDURE — C9290 INJ, BUPIVACAINE LIPOSOME: HCPCS | Performed by: STUDENT IN AN ORGANIZED HEALTH CARE EDUCATION/TRAINING PROGRAM

## 2024-01-31 PROCEDURE — 99900035 HC TECH TIME PER 15 MIN (STAT)

## 2024-01-31 PROCEDURE — 71000039 HC RECOVERY, EACH ADD'L HOUR: Performed by: STUDENT IN AN ORGANIZED HEALTH CARE EDUCATION/TRAINING PROGRAM

## 2024-01-31 RX ORDER — PROPOFOL 10 MG/ML
VIAL (ML) INTRAVENOUS
Status: DISCONTINUED | OUTPATIENT
Start: 2024-01-31 | End: 2024-01-31

## 2024-01-31 RX ORDER — AMLODIPINE BESYLATE 5 MG/1
10 TABLET ORAL DAILY
Status: DISCONTINUED | OUTPATIENT
Start: 2024-02-01 | End: 2024-02-08 | Stop reason: HOSPADM

## 2024-01-31 RX ORDER — SODIUM CHLORIDE 9 MG/ML
INJECTION, SOLUTION INTRAVENOUS CONTINUOUS
Status: DISCONTINUED | OUTPATIENT
Start: 2024-01-31 | End: 2024-02-04

## 2024-01-31 RX ORDER — DIPHENHYDRAMINE HYDROCHLORIDE 50 MG/ML
25 INJECTION INTRAMUSCULAR; INTRAVENOUS EVERY 6 HOURS PRN
Status: DISCONTINUED | OUTPATIENT
Start: 2024-01-31 | End: 2024-01-31 | Stop reason: HOSPADM

## 2024-01-31 RX ORDER — LIDOCAINE HYDROCHLORIDE 20 MG/ML
INJECTION INTRAVENOUS
Status: DISCONTINUED | OUTPATIENT
Start: 2024-01-31 | End: 2024-01-31

## 2024-01-31 RX ORDER — FENTANYL CITRATE 50 UG/ML
INJECTION, SOLUTION INTRAMUSCULAR; INTRAVENOUS
Status: DISCONTINUED | OUTPATIENT
Start: 2024-01-31 | End: 2024-01-31

## 2024-01-31 RX ORDER — ONDANSETRON HYDROCHLORIDE 2 MG/ML
INJECTION, SOLUTION INTRAMUSCULAR; INTRAVENOUS
Status: DISCONTINUED | OUTPATIENT
Start: 2024-01-31 | End: 2024-01-31

## 2024-01-31 RX ORDER — HYDROXYZINE PAMOATE 25 MG/1
100 CAPSULE ORAL ONCE
Status: DISCONTINUED | OUTPATIENT
Start: 2024-01-31 | End: 2024-02-08 | Stop reason: HOSPADM

## 2024-01-31 RX ORDER — BUPIVACAINE HYDROCHLORIDE 2.5 MG/ML
INJECTION, SOLUTION EPIDURAL; INFILTRATION; INTRACAUDAL
Status: DISCONTINUED | OUTPATIENT
Start: 2024-01-31 | End: 2024-01-31 | Stop reason: HOSPADM

## 2024-01-31 RX ORDER — ROCURONIUM BROMIDE 10 MG/ML
INJECTION, SOLUTION INTRAVENOUS
Status: DISCONTINUED | OUTPATIENT
Start: 2024-01-31 | End: 2024-01-31

## 2024-01-31 RX ORDER — SODIUM CHLORIDE 9 MG/ML
INJECTION, SOLUTION INTRAVENOUS CONTINUOUS
Status: DISCONTINUED | OUTPATIENT
Start: 2024-01-31 | End: 2024-01-31

## 2024-01-31 RX ORDER — MIRTAZAPINE 15 MG/1
15 TABLET, FILM COATED ORAL NIGHTLY
Status: DISCONTINUED | OUTPATIENT
Start: 2024-01-31 | End: 2024-02-08 | Stop reason: HOSPADM

## 2024-01-31 RX ORDER — DIVALPROEX SODIUM 250 MG/1
500 TABLET, DELAYED RELEASE ORAL 3 TIMES DAILY
Status: DISCONTINUED | OUTPATIENT
Start: 2024-01-31 | End: 2024-02-08 | Stop reason: HOSPADM

## 2024-01-31 RX ORDER — DEXMEDETOMIDINE HYDROCHLORIDE 100 UG/ML
INJECTION, SOLUTION INTRAVENOUS
Status: DISCONTINUED | OUTPATIENT
Start: 2024-01-31 | End: 2024-01-31

## 2024-01-31 RX ORDER — RISPERIDONE 1 MG/1
2 TABLET ORAL NIGHTLY
Status: DISCONTINUED | OUTPATIENT
Start: 2024-01-31 | End: 2024-02-08 | Stop reason: HOSPADM

## 2024-01-31 RX ORDER — HYDROMORPHONE HYDROCHLORIDE 2 MG/ML
0.2 INJECTION, SOLUTION INTRAMUSCULAR; INTRAVENOUS; SUBCUTANEOUS EVERY 5 MIN PRN
Status: DISCONTINUED | OUTPATIENT
Start: 2024-01-31 | End: 2024-01-31 | Stop reason: HOSPADM

## 2024-01-31 RX ORDER — PANTOPRAZOLE SODIUM 40 MG/1
40 TABLET, DELAYED RELEASE ORAL DAILY
Status: DISCONTINUED | OUTPATIENT
Start: 2024-02-01 | End: 2024-02-08 | Stop reason: HOSPADM

## 2024-01-31 RX ORDER — KETOROLAC TROMETHAMINE 30 MG/ML
INJECTION, SOLUTION INTRAMUSCULAR; INTRAVENOUS
Status: DISCONTINUED | OUTPATIENT
Start: 2024-01-31 | End: 2024-01-31

## 2024-01-31 RX ORDER — DEXAMETHASONE SODIUM PHOSPHATE 4 MG/ML
INJECTION, SOLUTION INTRA-ARTICULAR; INTRALESIONAL; INTRAMUSCULAR; INTRAVENOUS; SOFT TISSUE
Status: DISCONTINUED | OUTPATIENT
Start: 2024-01-31 | End: 2024-01-31

## 2024-01-31 RX ORDER — MEPERIDINE HYDROCHLORIDE 50 MG/ML
12.5 INJECTION INTRAMUSCULAR; INTRAVENOUS; SUBCUTANEOUS ONCE
Status: DISCONTINUED | OUTPATIENT
Start: 2024-01-31 | End: 2024-01-31 | Stop reason: HOSPADM

## 2024-01-31 RX ORDER — PROCHLORPERAZINE EDISYLATE 5 MG/ML
5 INJECTION INTRAMUSCULAR; INTRAVENOUS EVERY 4 HOURS PRN
Status: DISCONTINUED | OUTPATIENT
Start: 2024-01-31 | End: 2024-01-31 | Stop reason: HOSPADM

## 2024-01-31 RX ORDER — MUPIROCIN 20 MG/G
OINTMENT TOPICAL 2 TIMES DAILY
Status: COMPLETED | OUTPATIENT
Start: 2024-01-31 | End: 2024-02-05

## 2024-01-31 RX ORDER — OXYCODONE AND ACETAMINOPHEN 5; 325 MG/1; MG/1
2 TABLET ORAL EVERY 4 HOURS PRN
Status: DISCONTINUED | OUTPATIENT
Start: 2024-01-31 | End: 2024-02-01

## 2024-01-31 RX ORDER — FENTANYL CITRATE 50 UG/ML
25 INJECTION, SOLUTION INTRAMUSCULAR; INTRAVENOUS EVERY 5 MIN PRN
Status: DISCONTINUED | OUTPATIENT
Start: 2024-01-31 | End: 2024-01-31 | Stop reason: HOSPADM

## 2024-01-31 RX ORDER — HYDROMORPHONE HYDROCHLORIDE 2 MG/ML
0.2 INJECTION, SOLUTION INTRAMUSCULAR; INTRAVENOUS; SUBCUTANEOUS EVERY 5 MIN PRN
Status: DISCONTINUED | OUTPATIENT
Start: 2024-01-31 | End: 2024-01-31

## 2024-01-31 RX ORDER — METRONIDAZOLE 500 MG/100ML
500 INJECTION, SOLUTION INTRAVENOUS ONCE
Status: COMPLETED | OUTPATIENT
Start: 2024-01-31 | End: 2024-01-31

## 2024-01-31 RX ORDER — ACETAMINOPHEN 10 MG/ML
INJECTION, SOLUTION INTRAVENOUS
Status: DISCONTINUED | OUTPATIENT
Start: 2024-01-31 | End: 2024-01-31

## 2024-01-31 RX ORDER — LIDOCAINE HYDROCHLORIDE 10 MG/ML
1 INJECTION, SOLUTION EPIDURAL; INFILTRATION; INTRACAUDAL; PERINEURAL ONCE
Status: DISCONTINUED | OUTPATIENT
Start: 2024-01-31 | End: 2024-01-31

## 2024-01-31 RX ORDER — TRAZODONE HYDROCHLORIDE 50 MG/1
50 TABLET ORAL NIGHTLY PRN
Status: DISCONTINUED | OUTPATIENT
Start: 2024-01-31 | End: 2024-02-08 | Stop reason: HOSPADM

## 2024-01-31 RX ORDER — ONDANSETRON HYDROCHLORIDE 2 MG/ML
4 INJECTION, SOLUTION INTRAVENOUS EVERY 6 HOURS PRN
Status: DISCONTINUED | OUTPATIENT
Start: 2024-01-31 | End: 2024-02-08 | Stop reason: HOSPADM

## 2024-01-31 RX ORDER — OXYCODONE AND ACETAMINOPHEN 5; 325 MG/1; MG/1
1 TABLET ORAL EVERY 4 HOURS PRN
Status: DISCONTINUED | OUTPATIENT
Start: 2024-01-31 | End: 2024-02-02

## 2024-01-31 RX ORDER — MIDAZOLAM HYDROCHLORIDE 1 MG/ML
INJECTION INTRAMUSCULAR; INTRAVENOUS
Status: DISCONTINUED | OUTPATIENT
Start: 2024-01-31 | End: 2024-01-31

## 2024-01-31 RX ORDER — OXYCODONE HYDROCHLORIDE 5 MG/1
5 TABLET ORAL
Status: DISCONTINUED | OUTPATIENT
Start: 2024-01-31 | End: 2024-01-31 | Stop reason: HOSPADM

## 2024-01-31 RX ORDER — HYDROMORPHONE HYDROCHLORIDE 2 MG/ML
INJECTION, SOLUTION INTRAMUSCULAR; INTRAVENOUS; SUBCUTANEOUS
Status: DISCONTINUED | OUTPATIENT
Start: 2024-01-31 | End: 2024-01-31

## 2024-01-31 RX ORDER — DULOXETIN HYDROCHLORIDE 30 MG/1
60 CAPSULE, DELAYED RELEASE ORAL 2 TIMES DAILY
Status: DISCONTINUED | OUTPATIENT
Start: 2024-01-31 | End: 2024-02-08 | Stop reason: HOSPADM

## 2024-01-31 RX ORDER — ONDANSETRON HYDROCHLORIDE 2 MG/ML
4 INJECTION, SOLUTION INTRAVENOUS ONCE
Status: DISCONTINUED | OUTPATIENT
Start: 2024-01-31 | End: 2024-01-31 | Stop reason: HOSPADM

## 2024-01-31 RX ORDER — HYDROMORPHONE HYDROCHLORIDE 2 MG/ML
0.5 INJECTION, SOLUTION INTRAMUSCULAR; INTRAVENOUS; SUBCUTANEOUS
Status: DISCONTINUED | OUTPATIENT
Start: 2024-01-31 | End: 2024-02-07

## 2024-01-31 RX ORDER — SUCCINYLCHOLINE CHLORIDE 20 MG/ML
INJECTION INTRAMUSCULAR; INTRAVENOUS
Status: DISCONTINUED | OUTPATIENT
Start: 2024-01-31 | End: 2024-01-31

## 2024-01-31 RX ORDER — CEFAZOLIN SODIUM 2 G/50ML
2 SOLUTION INTRAVENOUS
Status: COMPLETED | OUTPATIENT
Start: 2024-01-31 | End: 2024-01-31

## 2024-01-31 RX ORDER — OXYCODONE HYDROCHLORIDE 5 MG/1
5 TABLET ORAL
Status: DISCONTINUED | OUTPATIENT
Start: 2024-01-31 | End: 2024-01-31

## 2024-01-31 RX ADMIN — PROCHLORPERAZINE EDISYLATE 5 MG: 5 INJECTION INTRAMUSCULAR; INTRAVENOUS at 03:01

## 2024-01-31 RX ADMIN — LIDOCAINE HYDROCHLORIDE 50 MG: 20 INJECTION, SOLUTION INTRAVENOUS at 03:01

## 2024-01-31 RX ADMIN — DEXMEDETOMIDINE HYDROCHLORIDE 6 MCG: 100 INJECTION, SOLUTION INTRAVENOUS at 03:01

## 2024-01-31 RX ADMIN — HYDROMORPHONE HYDROCHLORIDE 0.2 MG: 2 INJECTION INTRAMUSCULAR; INTRAVENOUS; SUBCUTANEOUS at 03:01

## 2024-01-31 RX ADMIN — ACETAMINOPHEN 1000 MG: 10 INJECTION, SOLUTION INTRAVENOUS at 02:01

## 2024-01-31 RX ADMIN — OXYCODONE HYDROCHLORIDE 5 MG: 5 TABLET ORAL at 03:01

## 2024-01-31 RX ADMIN — DIVALPROEX SODIUM 500 MG: 250 TABLET, DELAYED RELEASE ORAL at 09:01

## 2024-01-31 RX ADMIN — HYDROMORPHONE HYDROCHLORIDE 0.5 MG: 2 INJECTION INTRAMUSCULAR; INTRAVENOUS; SUBCUTANEOUS at 08:01

## 2024-01-31 RX ADMIN — HYDROMORPHONE HYDROCHLORIDE 1 MG: 2 INJECTION INTRAMUSCULAR; INTRAVENOUS; SUBCUTANEOUS at 01:01

## 2024-01-31 RX ADMIN — ROCURONIUM BROMIDE 30 MG: 10 INJECTION, SOLUTION INTRAVENOUS at 01:01

## 2024-01-31 RX ADMIN — SODIUM CHLORIDE, SODIUM GLUCONATE, SODIUM ACETATE, POTASSIUM CHLORIDE AND MAGNESIUM CHLORIDE: 526; 502; 368; 37; 30 INJECTION, SOLUTION INTRAVENOUS at 01:01

## 2024-01-31 RX ADMIN — MIDAZOLAM HYDROCHLORIDE 2 MG: 1 INJECTION, SOLUTION INTRAMUSCULAR; INTRAVENOUS at 12:01

## 2024-01-31 RX ADMIN — SUCCINYLCHOLINE CHLORIDE 160 MG: 20 INJECTION, SOLUTION INTRAMUSCULAR; INTRAVENOUS at 12:01

## 2024-01-31 RX ADMIN — CEFAZOLIN SODIUM 2 G: 2 SOLUTION INTRAVENOUS at 12:01

## 2024-01-31 RX ADMIN — OXYCODONE HYDROCHLORIDE AND ACETAMINOPHEN 2 TABLET: 5; 325 TABLET ORAL at 06:01

## 2024-01-31 RX ADMIN — MIRTAZAPINE 15 MG: 15 TABLET, FILM COATED ORAL at 09:01

## 2024-01-31 RX ADMIN — SUGAMMADEX 200 MG: 100 INJECTION, SOLUTION INTRAVENOUS at 02:01

## 2024-01-31 RX ADMIN — ROCURONIUM BROMIDE 5 MG: 10 INJECTION, SOLUTION INTRAVENOUS at 12:01

## 2024-01-31 RX ADMIN — SODIUM CHLORIDE, SODIUM GLUCONATE, SODIUM ACETATE, POTASSIUM CHLORIDE AND MAGNESIUM CHLORIDE: 526; 502; 368; 37; 30 INJECTION, SOLUTION INTRAVENOUS at 11:01

## 2024-01-31 RX ADMIN — METRONIDAZOLE 500 MG: 500 INJECTION, SOLUTION INTRAVENOUS at 01:01

## 2024-01-31 RX ADMIN — HYDROMORPHONE HYDROCHLORIDE 0.5 MG: 2 INJECTION INTRAMUSCULAR; INTRAVENOUS; SUBCUTANEOUS at 02:01

## 2024-01-31 RX ADMIN — DEXAMETHASONE SODIUM PHOSPHATE 8 MG: 4 INJECTION, SOLUTION INTRA-ARTICULAR; INTRALESIONAL; INTRAMUSCULAR; INTRAVENOUS; SOFT TISSUE at 12:01

## 2024-01-31 RX ADMIN — TRAZODONE HYDROCHLORIDE 50 MG: 50 TABLET ORAL at 09:01

## 2024-01-31 RX ADMIN — FENTANYL CITRATE 100 MCG: 50 INJECTION, SOLUTION INTRAMUSCULAR; INTRAVENOUS at 12:01

## 2024-01-31 RX ADMIN — SODIUM CHLORIDE, SODIUM GLUCONATE, SODIUM ACETATE, POTASSIUM CHLORIDE AND MAGNESIUM CHLORIDE: 526; 502; 368; 37; 30 INJECTION, SOLUTION INTRAVENOUS at 02:01

## 2024-01-31 RX ADMIN — HYDROMORPHONE HYDROCHLORIDE 0.5 MG: 2 INJECTION INTRAMUSCULAR; INTRAVENOUS; SUBCUTANEOUS at 03:01

## 2024-01-31 RX ADMIN — LIDOCAINE HYDROCHLORIDE 100 MG: 20 INJECTION, SOLUTION INTRAVENOUS at 12:01

## 2024-01-31 RX ADMIN — HYDROMORPHONE HYDROCHLORIDE 0.2 MG: 2 INJECTION INTRAMUSCULAR; INTRAVENOUS; SUBCUTANEOUS at 04:01

## 2024-01-31 RX ADMIN — OXYCODONE HYDROCHLORIDE AND ACETAMINOPHEN 2 TABLET: 5; 325 TABLET ORAL at 10:01

## 2024-01-31 RX ADMIN — DULOXETINE HYDROCHLORIDE 60 MG: 30 CAPSULE, DELAYED RELEASE ORAL at 09:01

## 2024-01-31 RX ADMIN — PROPOFOL 180 MG: 10 INJECTION, EMULSION INTRAVENOUS at 12:01

## 2024-01-31 RX ADMIN — MUPIROCIN: 20 OINTMENT TOPICAL at 09:01

## 2024-01-31 RX ADMIN — KETOROLAC TROMETHAMINE 30 MG: 30 INJECTION, SOLUTION INTRAMUSCULAR; INTRAVENOUS at 02:01

## 2024-01-31 RX ADMIN — ONDANSETRON 4 MG: 2 INJECTION INTRAMUSCULAR; INTRAVENOUS at 12:01

## 2024-01-31 RX ADMIN — SODIUM CHLORIDE: 9 INJECTION, SOLUTION INTRAVENOUS at 05:01

## 2024-01-31 RX ADMIN — RISPERIDONE 2 MG: 1 TABLET ORAL at 09:01

## 2024-01-31 NOTE — ANESTHESIA PREPROCEDURE EVALUATION
01/31/2024  Brayden Francois Jr. is a 34 y.o., male.      Pre-op Assessment    I have reviewed the Patient Summary Reports.     I have reviewed the Nursing Notes. I have reviewed the NPO Status.   I have reviewed the Medications.     Review of Systems  Anesthesia Hx:  No problems with previous Anesthesia                Social:  Smoker       Cardiovascular:     Hypertension, well controlled                                        Pulmonary:  Pulmonary Normal                       Renal/:  Renal/ Normal                 Hepatic/GI:        Diverticulosis s/p colostomy            Neurological:       Seizures, well controlled    Psychoactive substance induced psychosis                                Endocrine:  Endocrine Normal            Psych:  Psychiatric History (Homocidal Ideation, Psychosis, Flat Affect)                Physical Exam  General: Well nourished, Cooperative, Alert and Oriented    Airway:  Mallampati: II   Mouth Opening: Normal  TM Distance: Normal  Neck ROM: Normal ROM    Anesthesia Plan  Type of Anesthesia, risks & benefits discussed:    Anesthesia Type: Gen ETT, Gen Supraglottic Airway, Gen Natural Airway, MAC  Intra-op Monitoring Plan: Standard ASA Monitors  Post Op Pain Control Plan: multimodal analgesia  Induction:  IV  Airway Plan: Direct, Video and Fiberoptic, Post-Induction  Informed Consent: Informed consent signed with the Patient and all parties understand the risks and agree with anesthesia plan.  All questions answered.   ASA Score: 2    Ready For Surgery From Anesthesia Perspective.   .

## 2024-01-31 NOTE — H&P
[facility] Medicine  History & Physical    Patient Name: Brayden Francois Jr.  MRN: [id number]  Patient Class: IP- Inpatient  Admission Date: 1/31/2024  Attending Physician: Homer Pascual MD   Primary Care Provider: Ibis Rolle NP         Patient information was obtained from patient, past medical records, and ER records.     Subjective:     Principal Problem:Colostomy in place    Chief Complaint: No chief complaint on file.       HPI: Brayden Francois is a 34-year-old male who was seen in PACU.  He is postop day 0 colostomy reversal.  He has a patient Dr. Pascual's.  Patient had colostomy secondary to perforated diverticulum.  Previous medical history includes colostomy status, substance abuse history, psychiatric history.  Preop labs and imaging were reviewed.  Patient be admitted to Hospital Medicine for treatment and management.  Will follow Dr. Pascual's recommendations.    Past Medical History:   Diagnosis Date    Diverticulitis     Hypertension     Psychoactive substance-induced psychosis     Seizures        Past Surgical History:   Procedure Laterality Date    APPENDECTOMY      COLECTOMY, SIGMOID N/A 9/20/2023    Procedure: COLECTOMY, SIGMOID;  Surgeon: Homer Pascual MD;  Location: [facility] OR;  Service: General;  Laterality: N/A;    COLONOSCOPY N/A 11/7/2023    Procedure: COLONOSCOPY;  Surgeon: True Collier MD;  Location: [facility];  Service: Endoscopy;  Laterality: N/A;    COLOSTOMY N/A 9/20/2023    Procedure: CREATION, COLOSTOMY;  Surgeon: Homer Pascual MD;  Location: [facility] OR;  Service: General;  Laterality: N/A;    FRACTURE SURGERY      LAPAROTOMY, EXPLORATORY N/A 9/20/2023    Procedure: LAPAROTOMY, EXPLORATORY;  Surgeon: Homer Pascual MD;  Location: [facility] OR;  Service: General;  Laterality: N/A;    TONSILLECTOMY      WASHOUT N/A 9/20/2023    Procedure: ABDOMINAL WASHOUT;  Surgeon: Homer Pascual MD;  Location: [facility] OR;  Service: General;  Laterality:  N/A;       Review of patient's allergies indicates:  No Known Allergies    No current facility-administered medications on file prior to encounter.     Current Outpatient Medications on File Prior to Encounter   Medication Sig    amLODIPine (NORVASC) 10 MG tablet Take 10 mg by mouth once daily.    divalproex (DEPAKOTE) 500 MG TbEC Take 500 mg by mouth 3 (three) times daily.    DULoxetine (CYMBALTA) 60 MG capsule Take 1 capsule (60 mg total) by mouth 2 (two) times daily.    hydrOXYzine (VISTARIL) 100 MG capsule Take 100 mg by mouth once.    mirtazapine (REMERON) 15 MG tablet Take 1 tablet (15 mg total) by mouth every evening.    omeprazole (PRILOSEC) 20 MG capsule Take 20 mg by mouth once daily.    paliperidone (INVEGA) 3 MG TR24 Take 3 mg by mouth every evening.    risperiDONE (RISPERDAL) 2 MG tablet Take 1 tablet (2 mg total) by mouth every evening.    traZODone (DESYREL) 50 MG tablet Take 1 tablet (50 mg total) by mouth nightly as needed for Insomnia.    meloxicam (MOBIC) 7.5 MG tablet Take 7.5 mg by mouth once daily.    [DISCONTINUED] hydrOXYzine pamoate (VISTARIL) 25 MG Cap Take 25 mg by mouth.    [DISCONTINUED] ondansetron (ZOFRAN-ODT) 4 MG TbDL Take 1 tablet (4 mg total) by mouth every 6 (six) hours as needed.    [DISCONTINUED] oxyCODONE-acetaminophen (PERCOCET) 5-325 mg per tablet Take 1 tablet by mouth every 4 (four) hours as needed for Pain. (Patient not taking: Reported on 1/26/2024)     Family History       Problem Relation (Age of Onset)    Breast cancer Maternal Grandfather, Other          Tobacco Use    Smoking status: Every Day     Current packs/day: 1.00     Average packs/day: 1 pack/day for 12.0 years (12.0 ttl pk-yrs)     Types: Cigarettes, Vaping with nicotine    Smokeless tobacco: Former   Substance and Sexual Activity    Alcohol use: No    Drug use: No    Sexual activity: Not on file     Review of Systems   Constitutional:  Negative for activity change, chills, diaphoresis and fever.   HENT:   Negative for congestion, nosebleeds and tinnitus.    Eyes:  Negative for photophobia and visual disturbance.   Respiratory:  Negative for cough, chest tightness, shortness of breath and wheezing.    Cardiovascular:  Negative for chest pain, palpitations and leg swelling.   Gastrointestinal:  Positive for abdominal pain. Negative for abdominal distention, constipation, diarrhea, nausea and vomiting.   Endocrine: Negative for cold intolerance and heat intolerance.   Genitourinary:  Negative for difficulty urinating, dysuria, frequency, hematuria and urgency.   Musculoskeletal:  Negative for arthralgias, back pain and myalgias.   Skin:  Negative for pallor, rash and wound.   Allergic/Immunologic: Negative for immunocompromised state.   Neurological:  Negative for dizziness, tremors, facial asymmetry, speech difficulty and weakness.   Hematological:  Negative for adenopathy. Does not bruise/bleed easily.   Psychiatric/Behavioral:  Negative for confusion and sleep disturbance. The patient is not nervous/anxious.      Objective:     Vital Signs (Most Recent):  Temp: 97.8 °F (36.6 °C) (01/31/24 1632)  Pulse: 97 (01/31/24 1632)  Resp: 14 (01/31/24 1632)  BP: 120/87 (01/31/24 1632)  SpO2: 95 % (01/31/24 1632) Vital Signs (24h Range):  Temp:  [97.3 °F (36.3 °C)-98.4 °F (36.9 °C)] 97.8 °F (36.6 °C)  Pulse:  [64-97] 97  Resp:  [8-16] 14  SpO2:  [91 %-100 %] 95 %  BP: (110-153)/(63-96) 120/87     Weight: 111.6 kg (246 lb)  Body mass index is 35.3 kg/m².     Physical Exam  Vitals and nursing note reviewed.   Constitutional:       General: He is not in acute distress.     Appearance: He is well-developed. He is not diaphoretic.   HENT:      Head: Normocephalic.      Mouth/Throat:      Mouth: Mucous membranes are moist.      Pharynx: Oropharynx is clear.   Eyes:      General: No scleral icterus.     Conjunctiva/sclera: Conjunctivae normal.      Pupils: Pupils are equal, round, and reactive to light.   Neck:      Vascular: No JVD.    Cardiovascular:      Rate and Rhythm: Normal rate and regular rhythm.      Heart sounds: Normal heart sounds. No murmur heard.     No friction rub. No gallop.   Pulmonary:      Effort: Pulmonary effort is normal. No respiratory distress.      Breath sounds: Normal breath sounds. No wheezing or rales.   Abdominal:      General: Bowel sounds are normal. There is no distension.      Palpations: Abdomen is soft.      Tenderness: There is no abdominal tenderness. There is no guarding or rebound.   Musculoskeletal:         General: No tenderness. Normal range of motion.      Cervical back: Normal range of motion and neck supple.   Lymphadenopathy:      Cervical: No cervical adenopathy.   Skin:     General: Skin is warm and dry.      Capillary Refill: Capillary refill takes less than 2 seconds.      Coloration: Skin is not pale.      Findings: No erythema or rash.   Neurological:      Mental Status: He is alert and oriented to person, place, and time.      Cranial Nerves: No cranial nerve deficit.      Sensory: No sensory deficit.      Coordination: Coordination normal.      Deep Tendon Reflexes: Reflexes normal.   Psychiatric:         Behavior: Behavior normal.         Thought Content: Thought content normal.         Judgment: Judgment normal.              CRANIAL NERVES     CN III, IV, VI   Pupils are equal, round, and reactive to light.       Significant Labs: All pertinent labs within the past 24 hours have been reviewed.    Significant Imaging: I have reviewed all pertinent imaging results/findings within the past 24 hours.  Assessment/Plan:     * Colostomy in place  Acute problem   Status post colostomy reversal  Postop day 0   Follow general surgery recommendations         Psychosis  Chronic problem  Continue psychiatric medications   Monitor closely        VTE Risk Mitigation (From admission, onward)      None                            Brayden Crow NP  Department of Hospital Medicine  Critical access hospital  - Med/Surg

## 2024-01-31 NOTE — ASSESSMENT & PLAN NOTE
Acute problem   Status post colostomy reversal  Postop day 0   Follow general surgery recommendations

## 2024-01-31 NOTE — OP NOTE
[facility]  Surgery Department  Operative Note    SUMMARY     Date of Procedure: 1/31/2024     Procedure: Procedure(s) (LRB):  LAPAROTOMY, EXPLORATORY (N/A)  CLOSURE, COLOSTOMY  Partial colectomy  Colorectal anastomosis  Flexible sigmoidoscopy    Surgeon(s) and Role:     * Homer Pascual MD - Primary    Assisting Surgeon: Sallie WRIGHT    Pre-Operative Diagnosis: Perforated diverticulum [K57.80]  Colostomy in place [Z93.3]    Post-Operative Diagnosis: Post-Op Diagnosis Codes:     * Perforated diverticulum [K57.80]     * Colostomy in place [Z93.3]    Anesthesia: General    Operative Findings (including complications, if any):  Takedown of left lower quadrant end colostomy.  Creation 31 mm EEA colorectal anastomosis.  Flexible sigmoidoscopy with negative leak test    Description of Technical Procedures:  This is a 34-year-old male who previously underwent Kamla's procedure for perforated diverticulitis.  He did consent to operative reversal.  He was taken to the OR, placed supine, general endotracheal anesthesia was induced, the abdomen was prepped and draped in the usual fashion, a time-out was completed.  A generous midline incision was made sharply at the site of his old surgical site.  Deep tissues were divided using electrocautery down to the midline fascia.  There was early hernia changes with multiple Swiss cheese defects.  The abdomen was entered without apparent injury to underlying viscera.  Midline fascia was opened widely.  Adhesions were lysed moving towards the pelvis until the rectal stump was identified.  A circular incision was then made around the left lower quadrant ostomy.  The colon was freed from the surrounding fatty tissues using electrocautery and sharp dissection as needed and fascial attachments were divided.  The end of the descending colon was delivered back into the abdominal cavity.  The distal portion the colon was divided using electrocautery to  fresh in the edges.  A 31 EEA anvil was placed within the lumen of the distal descending colon and the colotomy was then closed with a blue staple.  The tip of the anvil was delivered through the anti mesenteric border of the colon.  Attention was then turned to the rectal stump.  Rectal sizers were used to dilate the rectal stump up to a 31 mm EEA stapler.  The stapler was placed within the rectal cavity and the spike was brought out through the superior and anterior aspect of the rectal stump.  A colorectal anastomosis was then created by connecting the stapler to the anvil.  A flexible sigmoidoscopy was then performed while occluding the descending colon.  The staple line was visualized and was intact.  The rectum was then insufflated while the anastomosis was underwater noting no bubbles consistent with a negative test.  Insufflation was then evacuated and the scope was withdrawn.  Finally, the abdominal cavity was inspected for hemostasis which was adequate.  Fascia at the colostomy was closed in 2 layers with PDS sutures.  Midline fascia was then also closed with running PDS sutures.  Both skin incisions were closed with dermal Vicryl sutures and staples.  Dressings were applied.  Patient tolerated the entire procedure without apparent complication, was extubated in the OR, brought to recovery in stable condition.  All counts were correct.    Significant Surgical Tasks Conducted by the Assistant(s), if Applicable:  Patient positioning and prep, bedside assist, skin closure, bandage application.    Estimated Blood Loss (EBL): 50 mL           Implants: * No implants in log *    Specimens:   Specimen (24h ago, onward)       Start     Ordered    01/31/24 1443  Specimen to Pathology - Surgery  Once        Comments: Pre-op Diagnosis: Perforated diverticulum [K57.80]Colostomy in place [Z93.3]Procedure(s):LAPAROTOMY, EXPLORATORYCLOSURE, COLOSTOMY, LAPAROSCOPIC Number of specimens: 1Name of specimens: 1. Distal  descending colon with anastomotic donuts     Question:  Release to patient  Answer:  Immediate    01/31/24 7413                            Condition: Good    Disposition: PACU - hemodynamically stable.    Attestation: I was present and scrubbed for the entire procedure.

## 2024-01-31 NOTE — ANESTHESIA POSTPROCEDURE EVALUATION
Anesthesia Post Evaluation    Patient: Brayden Francois     Procedure(s) Performed: Procedure(s) (LRB):  LAPAROTOMY, EXPLORATORY (N/A)  CLOSURE, COLOSTOMY, LAPAROSCOPIC (N/A)    Final Anesthesia Type: general      Patient location during evaluation: PACU  Patient participation: Yes- Able to Participate  Level of consciousness: awake and alert and oriented  Post-procedure vital signs: reviewed and stable  Pain management: adequate  Airway patency: patent    PONV status at discharge: No PONV  Anesthetic complications: no      Cardiovascular status: blood pressure returned to baseline and stable  Respiratory status: unassisted and spontaneous ventilation  Hydration status: euvolemic  Follow-up not needed.              Vitals Value Taken Time   /87 01/31/24 1632   Temp 36.6 °C (97.8 °F) 01/31/24 1632   Pulse 97 01/31/24 1632   Resp 14 01/31/24 1632   SpO2 95 % 01/31/24 1632         Event Time   Out of Recovery 16:25:00         Pain/Hilary Score: Pain Rating Prior to Med Admin: 6 (1/31/2024  4:10 PM)  Pain Rating Post Med Admin: 3 (1/31/2024  4:41 PM)  Hilary Score: 9 (1/31/2024  4:15 PM)

## 2024-01-31 NOTE — PLAN OF CARE
Patient prepared for surgery. Surgical and anesthesia consents signed. Performed incentive spirometer teaching. Belongings given to mom.Text message alerts set up with mom. Scds on and fall risk band.

## 2024-01-31 NOTE — SUBJECTIVE & OBJECTIVE
Past Medical History:   Diagnosis Date    Diverticulitis     Hypertension     Psychoactive substance-induced psychosis     Seizures        Past Surgical History:   Procedure Laterality Date    APPENDECTOMY      COLECTOMY, SIGMOID N/A 9/20/2023    Procedure: COLECTOMY, SIGMOID;  Surgeon: Homer Pascual MD;  Location: [facility] OR;  Service: General;  Laterality: N/A;    COLONOSCOPY N/A 11/7/2023    Procedure: COLONOSCOPY;  Surgeon: True Collier MD;  Location: [facility] ENDO;  Service: Endoscopy;  Laterality: N/A;    COLOSTOMY N/A 9/20/2023    Procedure: CREATION, COLOSTOMY;  Surgeon: Homer Pascual MD;  Location: [facility] OR;  Service: General;  Laterality: N/A;    FRACTURE SURGERY      LAPAROTOMY, EXPLORATORY N/A 9/20/2023    Procedure: LAPAROTOMY, EXPLORATORY;  Surgeon: Homer Pascual MD;  Location: [facility] OR;  Service: General;  Laterality: N/A;    TONSILLECTOMY      WASHOUT N/A 9/20/2023    Procedure: ABDOMINAL WASHOUT;  Surgeon: Homer Pascual MD;  Location: [facility] OR;  Service: General;  Laterality: N/A;       Review of patient's allergies indicates:  No Known Allergies    No current facility-administered medications on file prior to encounter.     Current Outpatient Medications on File Prior to Encounter   Medication Sig    amLODIPine (NORVASC) 10 MG tablet Take 10 mg by mouth once daily.    divalproex (DEPAKOTE) 500 MG TbEC Take 500 mg by mouth 3 (three) times daily.    DULoxetine (CYMBALTA) 60 MG capsule Take 1 capsule (60 mg total) by mouth 2 (two) times daily.    hydrOXYzine (VISTARIL) 100 MG capsule Take 100 mg by mouth once.    mirtazapine (REMERON) 15 MG tablet Take 1 tablet (15 mg total) by mouth every evening.    omeprazole (PRILOSEC) 20 MG capsule Take 20 mg by mouth once daily.    paliperidone (INVEGA) 3 MG TR24 Take 3 mg by mouth every evening.    risperiDONE (RISPERDAL) 2 MG tablet Take 1 tablet (2 mg total) by mouth every evening.    traZODone (DESYREL) 50 MG tablet Take 1 tablet (50 mg total) by  mouth nightly as needed for Insomnia.    meloxicam (MOBIC) 7.5 MG tablet Take 7.5 mg by mouth once daily.    [DISCONTINUED] hydrOXYzine pamoate (VISTARIL) 25 MG Cap Take 25 mg by mouth.    [DISCONTINUED] ondansetron (ZOFRAN-ODT) 4 MG TbDL Take 1 tablet (4 mg total) by mouth every 6 (six) hours as needed.    [DISCONTINUED] oxyCODONE-acetaminophen (PERCOCET) 5-325 mg per tablet Take 1 tablet by mouth every 4 (four) hours as needed for Pain. (Patient not taking: Reported on 1/26/2024)     Family History       Problem Relation (Age of Onset)    Breast cancer Maternal Grandfather, Other          Tobacco Use    Smoking status: Every Day     Current packs/day: 1.00     Average packs/day: 1 pack/day for 12.0 years (12.0 ttl pk-yrs)     Types: Cigarettes, Vaping with nicotine    Smokeless tobacco: Former   Substance and Sexual Activity    Alcohol use: No    Drug use: No    Sexual activity: Not on file     Review of Systems   Constitutional:  Negative for activity change, chills, diaphoresis and fever.   HENT:  Negative for congestion, nosebleeds and tinnitus.    Eyes:  Negative for photophobia and visual disturbance.   Respiratory:  Negative for cough, chest tightness, shortness of breath and wheezing.    Cardiovascular:  Negative for chest pain, palpitations and leg swelling.   Gastrointestinal:  Positive for abdominal pain. Negative for abdominal distention, constipation, diarrhea, nausea and vomiting.   Endocrine: Negative for cold intolerance and heat intolerance.   Genitourinary:  Negative for difficulty urinating, dysuria, frequency, hematuria and urgency.   Musculoskeletal:  Negative for arthralgias, back pain and myalgias.   Skin:  Negative for pallor, rash and wound.   Allergic/Immunologic: Negative for immunocompromised state.   Neurological:  Negative for dizziness, tremors, facial asymmetry, speech difficulty and weakness.   Hematological:  Negative for adenopathy. Does not bruise/bleed easily.    Psychiatric/Behavioral:  Negative for confusion and sleep disturbance. The patient is not nervous/anxious.      Objective:     Vital Signs (Most Recent):  Temp: 97.8 °F (36.6 °C) (01/31/24 1632)  Pulse: 97 (01/31/24 1632)  Resp: 14 (01/31/24 1632)  BP: 120/87 (01/31/24 1632)  SpO2: 95 % (01/31/24 1632) Vital Signs (24h Range):  Temp:  [97.3 °F (36.3 °C)-98.4 °F (36.9 °C)] 97.8 °F (36.6 °C)  Pulse:  [64-97] 97  Resp:  [8-16] 14  SpO2:  [91 %-100 %] 95 %  BP: (110-153)/(63-96) 120/87     Weight: 111.6 kg (246 lb)  Body mass index is 35.3 kg/m².     Physical Exam  Vitals and nursing note reviewed.   Constitutional:       General: He is not in acute distress.     Appearance: He is well-developed. He is not diaphoretic.   HENT:      Head: Normocephalic.      Mouth/Throat:      Mouth: Mucous membranes are moist.      Pharynx: Oropharynx is clear.   Eyes:      General: No scleral icterus.     Conjunctiva/sclera: Conjunctivae normal.      Pupils: Pupils are equal, round, and reactive to light.   Neck:      Vascular: No JVD.   Cardiovascular:      Rate and Rhythm: Normal rate and regular rhythm.      Heart sounds: Normal heart sounds. No murmur heard.     No friction rub. No gallop.   Pulmonary:      Effort: Pulmonary effort is normal. No respiratory distress.      Breath sounds: Normal breath sounds. No wheezing or rales.   Abdominal:      General: Bowel sounds are normal. There is no distension.      Palpations: Abdomen is soft.      Tenderness: There is no abdominal tenderness. There is no guarding or rebound.   Musculoskeletal:         General: No tenderness. Normal range of motion.      Cervical back: Normal range of motion and neck supple.   Lymphadenopathy:      Cervical: No cervical adenopathy.   Skin:     General: Skin is warm and dry.      Capillary Refill: Capillary refill takes less than 2 seconds.      Coloration: Skin is not pale.      Findings: No erythema or rash.   Neurological:      Mental Status: He is  alert and oriented to person, place, and time.      Cranial Nerves: No cranial nerve deficit.      Sensory: No sensory deficit.      Coordination: Coordination normal.      Deep Tendon Reflexes: Reflexes normal.   Psychiatric:         Behavior: Behavior normal.         Thought Content: Thought content normal.         Judgment: Judgment normal.              CRANIAL NERVES     CN III, IV, VI   Pupils are equal, round, and reactive to light.       Significant Labs: All pertinent labs within the past 24 hours have been reviewed.    Significant Imaging: I have reviewed all pertinent imaging results/findings within the past 24 hours.

## 2024-01-31 NOTE — TRANSFER OF CARE
"Anesthesia Transfer of Care Note    Patient: Brayden Francois Jr.    Procedure(s) Performed: Procedure(s) (LRB):  LAPAROTOMY, EXPLORATORY (N/A)  CLOSURE, COLOSTOMY, LAPAROSCOPIC (N/A)    Patient location: PACU    Anesthesia Type: general    Transport from OR: Transported from OR on 2-3 L/min O2 by NC with adequate spontaneous ventilation    Post pain: adequate analgesia    Post assessment: no apparent anesthetic complications and tolerated procedure well    Post vital signs: stable    Level of consciousness: sedated and responds to stimulation    Nausea/Vomiting: no nausea/vomiting    Complications: none    Transfer of care protocol was followed      Last vitals: Visit Vitals  /81 (BP Location: Right arm)   Pulse 74   Temp 36.9 °C (98.4 °F) (Temporal)   Resp 16   Ht 5' 10" (1.778 m)   Wt 111.6 kg (246 lb)   SpO2 97%   BMI 35.30 kg/m²     "

## 2024-01-31 NOTE — HPI
Brayden Francois is a 34-year-old male who was seen in PACU.  He is postop day 0 colostomy reversal.  He has a patient Dr. Pascual's.  Patient had colostomy secondary to perforated diverticulum.  Previous medical history includes colostomy status, substance abuse history, psychiatric history.  Preop labs and imaging were reviewed.  Patient be admitted to Hospital Medicine for treatment and management.  Will follow Dr. Pascual's recommendations.

## 2024-01-31 NOTE — CARE UPDATE
01/31/24 1734   Patient Assessment/Suction   Level of Consciousness (AVPU) alert   Respiratory Effort Normal;Shallow   Rhythm/Pattern, Respiratory no shortness of breath reported   PRE-TX-O2   Device (Oxygen Therapy) nasal cannula   $ Is the patient on Low Flow Oxygen? Yes   Flow (L/min) 3   SpO2 95 %   Pulse Oximetry Type Intermittent   $ Pulse Oximetry - Multiple Charge Pulse Oximetry - Multiple   Pulse 97   Resp 14   Positioning HOB elevated 30 degrees   Tobacco Cessation Intervention   Do you use any type of tobacco product? Yes   Are you interested in quitting use of tobacco products? Not interested   Respiratory Evaluation   $ Care Plan Tech Time 15 min   Admitting Diagnosis colostomy   Home Oxygen   Has Home Oxygen? No   Oxygen Care Plan   Oxygen Care Plan Per Protocol   SPO2 Goal (%) 92% non-cardiac   Rationale SpO2 is <92% (Non-cardiac Pt.)   Bronchodilator Care Plan   Rationale No Rationale found   Atelectasis Care Plan   Atelectasis Care Plan Incentive Spiromentry   Frequency TID   Rationale Post-op abdominal   Airway Clearance Care Plan   Rationale No rationale found

## 2024-01-31 NOTE — ANESTHESIA PROCEDURE NOTES
Intubation    Date/Time: 1/31/2024 12:38 PM    Performed by: SharonPeter Jr. CRNA  Authorized by: Paulo Hernandez MD    Intubation:     Induction:  Intravenous    Intubated:  Postinduction    Mask Ventilation:  Easy with oral airway    Attempts:  1    Attempted By:  Student    Method of Intubation:  Video laryngoscopy    Blade:  Kaba 3    Laryngeal View Grade: Grade I - full view of cords      Difficult Airway Encountered?: No      Complications:  None    Airway Device:  Oral endotracheal tube    Airway Device Size:  7.5    Style/Cuff Inflation:  Cuffed (inflated to minimal occlusive pressure)    Tube secured:  23    Secured at:  The lips    Placement Verified By:  Capnometry and Revisualization with laryngoscopy    Complicating Factors:  None    Findings Post-Intubation:  BS equal bilateral

## 2024-02-01 LAB
ANION GAP SERPL CALC-SCNC: 9 MMOL/L (ref 8–16)
BUN SERPL-MCNC: 12 MG/DL (ref 6–20)
CALCIUM SERPL-MCNC: 8 MG/DL (ref 8.7–10.5)
CHLORIDE SERPL-SCNC: 101 MMOL/L (ref 95–110)
CO2 SERPL-SCNC: 25 MMOL/L (ref 23–29)
CREAT SERPL-MCNC: 0.8 MG/DL (ref 0.5–1.4)
ERYTHROCYTE [DISTWIDTH] IN BLOOD BY AUTOMATED COUNT: 13.5 % (ref 11.5–14.5)
EST. GFR  (NO RACE VARIABLE): >60 ML/MIN/1.73 M^2
GLUCOSE SERPL-MCNC: 122 MG/DL (ref 70–110)
HCT VFR BLD AUTO: 41.2 % (ref 40–54)
HGB BLD-MCNC: 13.3 G/DL (ref 14–18)
MCH RBC QN AUTO: 27.3 PG (ref 27–31)
MCHC RBC AUTO-ENTMCNC: 32.3 G/DL (ref 32–36)
MCV RBC AUTO: 84 FL (ref 82–98)
PLATELET # BLD AUTO: 276 K/UL (ref 150–450)
PMV BLD AUTO: 8.4 FL (ref 9.2–12.9)
POTASSIUM SERPL-SCNC: 4.6 MMOL/L (ref 3.5–5.1)
RBC # BLD AUTO: 4.88 M/UL (ref 4.6–6.2)
SODIUM SERPL-SCNC: 135 MMOL/L (ref 136–145)
WBC # BLD AUTO: 15.07 K/UL (ref 3.9–12.7)

## 2024-02-01 PROCEDURE — 94799 UNLISTED PULMONARY SVC/PX: CPT | Mod: XB

## 2024-02-01 PROCEDURE — 85027 COMPLETE CBC AUTOMATED: CPT | Performed by: STUDENT IN AN ORGANIZED HEALTH CARE EDUCATION/TRAINING PROGRAM

## 2024-02-01 PROCEDURE — 63600175 PHARM REV CODE 636 W HCPCS: Performed by: STUDENT IN AN ORGANIZED HEALTH CARE EDUCATION/TRAINING PROGRAM

## 2024-02-01 PROCEDURE — 80048 BASIC METABOLIC PNL TOTAL CA: CPT | Performed by: STUDENT IN AN ORGANIZED HEALTH CARE EDUCATION/TRAINING PROGRAM

## 2024-02-01 PROCEDURE — 99406 BEHAV CHNG SMOKING 3-10 MIN: CPT

## 2024-02-01 PROCEDURE — 25000003 PHARM REV CODE 250: Performed by: NURSE PRACTITIONER

## 2024-02-01 PROCEDURE — 94761 N-INVAS EAR/PLS OXIMETRY MLT: CPT

## 2024-02-01 PROCEDURE — 99900035 HC TECH TIME PER 15 MIN (STAT)

## 2024-02-01 PROCEDURE — S4991 NICOTINE PATCH NONLEGEND: HCPCS | Performed by: NURSE PRACTITIONER

## 2024-02-01 PROCEDURE — 36415 COLL VENOUS BLD VENIPUNCTURE: CPT | Performed by: STUDENT IN AN ORGANIZED HEALTH CARE EDUCATION/TRAINING PROGRAM

## 2024-02-01 PROCEDURE — 25000003 PHARM REV CODE 250: Performed by: STUDENT IN AN ORGANIZED HEALTH CARE EDUCATION/TRAINING PROGRAM

## 2024-02-01 PROCEDURE — 11000001 HC ACUTE MED/SURG PRIVATE ROOM

## 2024-02-01 RX ORDER — OXYCODONE HYDROCHLORIDE 5 MG/1
15 TABLET ORAL EVERY 4 HOURS PRN
Status: DISCONTINUED | OUTPATIENT
Start: 2024-02-01 | End: 2024-02-02

## 2024-02-01 RX ORDER — OXYCODONE HYDROCHLORIDE 5 MG/1
15 TABLET ORAL EVERY 6 HOURS PRN
Status: DISCONTINUED | OUTPATIENT
Start: 2024-02-01 | End: 2024-02-01

## 2024-02-01 RX ORDER — IBUPROFEN 200 MG
1 TABLET ORAL DAILY
Status: DISCONTINUED | OUTPATIENT
Start: 2024-02-01 | End: 2024-02-08 | Stop reason: HOSPADM

## 2024-02-01 RX ADMIN — Medication 1 PATCH: at 08:02

## 2024-02-01 RX ADMIN — DULOXETINE HYDROCHLORIDE 60 MG: 30 CAPSULE, DELAYED RELEASE ORAL at 09:02

## 2024-02-01 RX ADMIN — DIVALPROEX SODIUM 500 MG: 250 TABLET, DELAYED RELEASE ORAL at 09:02

## 2024-02-01 RX ADMIN — HYDROMORPHONE HYDROCHLORIDE 0.5 MG: 2 INJECTION INTRAMUSCULAR; INTRAVENOUS; SUBCUTANEOUS at 02:02

## 2024-02-01 RX ADMIN — OXYCODONE HYDROCHLORIDE 15 MG: 5 TABLET ORAL at 09:02

## 2024-02-01 RX ADMIN — MIRTAZAPINE 15 MG: 15 TABLET, FILM COATED ORAL at 09:02

## 2024-02-01 RX ADMIN — OXYCODONE HYDROCHLORIDE 15 MG: 5 TABLET ORAL at 12:02

## 2024-02-01 RX ADMIN — RISPERIDONE 2 MG: 1 TABLET ORAL at 09:02

## 2024-02-01 RX ADMIN — DIVALPROEX SODIUM 500 MG: 250 TABLET, DELAYED RELEASE ORAL at 03:02

## 2024-02-01 RX ADMIN — PANTOPRAZOLE SODIUM 40 MG: 40 TABLET, DELAYED RELEASE ORAL at 08:02

## 2024-02-01 RX ADMIN — HYDROMORPHONE HYDROCHLORIDE 0.5 MG: 2 INJECTION INTRAMUSCULAR; INTRAVENOUS; SUBCUTANEOUS at 06:02

## 2024-02-01 RX ADMIN — HYDROMORPHONE HYDROCHLORIDE 0.5 MG: 2 INJECTION INTRAMUSCULAR; INTRAVENOUS; SUBCUTANEOUS at 07:02

## 2024-02-01 RX ADMIN — OXYCODONE HYDROCHLORIDE AND ACETAMINOPHEN 2 TABLET: 5; 325 TABLET ORAL at 08:02

## 2024-02-01 RX ADMIN — MUPIROCIN: 20 OINTMENT TOPICAL at 09:02

## 2024-02-01 RX ADMIN — SODIUM CHLORIDE: 9 INJECTION, SOLUTION INTRAVENOUS at 03:02

## 2024-02-01 RX ADMIN — HYDROMORPHONE HYDROCHLORIDE 0.5 MG: 2 INJECTION INTRAMUSCULAR; INTRAVENOUS; SUBCUTANEOUS at 11:02

## 2024-02-01 RX ADMIN — DIVALPROEX SODIUM 500 MG: 250 TABLET, DELAYED RELEASE ORAL at 08:02

## 2024-02-01 RX ADMIN — OXYCODONE HYDROCHLORIDE AND ACETAMINOPHEN 2 TABLET: 5; 325 TABLET ORAL at 04:02

## 2024-02-01 RX ADMIN — OXYCODONE HYDROCHLORIDE 15 MG: 5 TABLET ORAL at 05:02

## 2024-02-01 RX ADMIN — DULOXETINE HYDROCHLORIDE 60 MG: 30 CAPSULE, DELAYED RELEASE ORAL at 08:02

## 2024-02-01 RX ADMIN — MUPIROCIN: 20 OINTMENT TOPICAL at 08:02

## 2024-02-01 RX ADMIN — HYDROMORPHONE HYDROCHLORIDE 0.5 MG: 2 INJECTION INTRAMUSCULAR; INTRAVENOUS; SUBCUTANEOUS at 03:02

## 2024-02-01 RX ADMIN — AMLODIPINE BESYLATE 10 MG: 5 TABLET ORAL at 08:02

## 2024-02-01 NOTE — PLAN OF CARE
Problem: Infection  Goal: Absence of Infection Signs and Symptoms  Outcome: Ongoing, Progressing     Problem: Infection (Bowel Resection)  Goal: Absence of Infection Signs and Symptoms  Outcome: Ongoing, Progressing     Problem: Pain (Bowel Resection)  Goal: Acceptable Pain Control  Outcome: Ongoing, Progressing     Problem: Postoperative Urinary Retention (Bowel Resection)  Goal: Effective Urinary Elimination  Outcome: Ongoing, Progressing     Pt remained stable this shift. Dressing c/d/I with old drainage noted to LLQ abdomen. Some scant bleeding noted to rectum. IVF infusing to R hand c/d/I with no drainage, redness or swelling noted. Medicated as needed for pain. Safety and Q@H rounding maintained throughout shift. Danielle catheter was D/C'd. Dressings were changed to LLQ abdomen. Pt tolerated well. Urinal provided.

## 2024-02-01 NOTE — RESPIRATORY THERAPY
02/01/24 0805   Patient Assessment/Suction   Level of Consciousness (AVPU) alert   Respiratory Effort Normal;Unlabored   Expansion/Accessory Muscles/Retractions expansion symmetric;no retractions;no use of accessory muscles   All Lung Fields Breath Sounds Anterior:;Lateral:;clear;equal bilaterally   Rhythm/Pattern, Respiratory depth regular;pattern regular;unlabored   Cough Frequency no cough   PRE-TX-O2   Device (Oxygen Therapy) room air   SpO2 96 %   Pulse Oximetry Type Intermittent   $ Pulse Oximetry - Multiple Charge Pulse Oximetry - Multiple   Pulse 100   Resp 18   Positioning HOB elevated 45 degrees   Incentive Spirometer   $ Incentive Spirometer Charges done with encouragement   Incentive Spirometer Predicted Level (mL) 2480   Administration (IS) proper technique demonstrated   Number of Repetitions (IS) 5   Level Incentive Spirometer (mL) 3500   Patient Tolerance (IS) good;no adverse signs/symptoms present

## 2024-02-01 NOTE — PROGRESS NOTES
Patient seen and examined.  Resting comfortably.      Vitals are stable   Afebrile  Abdomen soft, appropriately tender   Mild distention, expected     Labs reviewed.  Mild leukocytosis     Overall seems to be doing well.  Pain is reasonably controlled.    Keep on clear liquids today   Following

## 2024-02-01 NOTE — CARE UPDATE
02/01/24 0800   Tobacco Cessation Intervention   Do you use any type of tobacco product? Yes   Are you interested in quitting use of tobacco products? Not interested   Are you interested in Nicotine Replacement for symptom relief? Yes   $ Smoking Cessation Charges Smoking Cessation - Intermediate (CTTS)     Nicotine patch requested

## 2024-02-01 NOTE — ASSESSMENT & PLAN NOTE
Acute problem   Status post colostomy reversal  Postop day 1   Follow general surgery recommendations   Monitor H/H closely   Aggressive Incentive spirometry  Pain control with p.r.n. narcotics   Antiemetics p.r.n.   Encourage early ambulation   Diet per General surgery recommendations  SCDs DVT prophylaxis per General surgery recommendations

## 2024-02-01 NOTE — SUBJECTIVE & OBJECTIVE
Interval History:  Patient seen and examined.  No acute events overnight.  Is tolerating his clear liquid diet well.  Does report that he has had some belching with reflux.  In his past flatus.  He reports his abdominal pain is currently 8/10 scale and described as heaviness.  He feels bloated.  He is currently sitting in the chair during my examination.  Ambulation has been encouraged.    Review of Systems   Gastrointestinal:  Positive for abdominal distention and abdominal pain.   All other systems reviewed and are negative.    Objective:     Vital Signs (Most Recent):  Temp: 98 °F (36.7 °C) (02/01/24 1130)  Pulse: 97 (02/01/24 1130)  Resp: 18 (02/01/24 1130)  BP: (!) 154/91 (02/01/24 1130)  SpO2: 96 % (02/01/24 1130) Vital Signs (24h Range):  Temp:  [97.3 °F (36.3 °C)-98.2 °F (36.8 °C)] 98 °F (36.7 °C)  Pulse:  [] 97  Resp:  [8-20] 18  SpO2:  [91 %-100 %] 96 %  BP: (110-157)/(63-98) 154/91     Weight: 111.6 kg (246 lb)  Body mass index is 35.3 kg/m².    Intake/Output Summary (Last 24 hours) at 2/1/2024 1143  Last data filed at 2/1/2024 0620  Gross per 24 hour   Intake 3890 ml   Output 2600 ml   Net 1290 ml         Physical Exam  Constitutional:       General: He is not in acute distress.     Appearance: He is not ill-appearing, toxic-appearing or diaphoretic.   Cardiovascular:      Rate and Rhythm: Normal rate and regular rhythm.      Pulses: Normal pulses.      Heart sounds: Normal heart sounds.   Pulmonary:      Effort: Pulmonary effort is normal. No respiratory distress.      Breath sounds: Normal breath sounds.   Abdominal:      General: There is distension (mild).      Tenderness: There is abdominal tenderness (appropriately tender). There is no guarding.      Comments: Hypoactive bowel sounds.  Abdominal dressing clean dry and intact.   Skin:     General: Skin is warm and dry.      Coloration: Skin is not pale.   Neurological:      Mental Status: He is alert and oriented to person, place, and time.  Mental status is at baseline.             Significant Labs: All pertinent labs within the past 24 hours have been reviewed.  CBC:   Recent Labs   Lab 02/01/24  0701   WBC 15.07*   HGB 13.3*   HCT 41.2        CMP:   Recent Labs   Lab 02/01/24  0701   *   K 4.6      CO2 25   *   BUN 12   CREATININE 0.8   CALCIUM 8.0*   ANIONGAP 9       Significant Imaging: I have reviewed all pertinent imaging results/findings within the past 24 hours.   Adequate: hears normal conversation without difficulty

## 2024-02-01 NOTE — PLAN OF CARE
[facility] - Med/Surg  Initial Discharge Assessment       Primary Care Provider: Ibis Rolle NP    Admission Diagnosis: Perforated diverticulum [K57.80]  Colostomy in place [Z93.3]    Admission Date: 1/31/2024  Expected Discharge Date: 2/3/2024    Transition of Care Barriers: None    Payor: MEDICAID / Plan: [employer] / Product Type: Managed Medicaid /     Extended Emergency Contact Information  Primary Emergency Contact: HERNÁN ABERNATHY  Mobile Phone: [phone]  Relation: Mother  Preferred language: English   needed? No  Secondary Emergency Contact: Sr Brayden Abernathy  Mobile Phone: [phone]  Relation: Father   needed? No    Discharge Plan A: Home with family  Discharge Plan B: Home      [facility] [address] [address]  [address]  [place] [address]  Phone: [phone] Fax: [phone]      Attempted to complete DC assessment at bedside with pt but pt requested for me to contact his mother. CM completed DC assessment over the phone with pt's mother, Hernán. Verified information on facesheet as correct. Denies POA. MIR is his mother. Lives at listed address with parents. Does not have PCP and does not want follow up at [facility]- requested for PCP referral to be placed to Dr. Torres. Pharmacy for DC is [facility]. Denies hh/hd/blood thinners/outpt services. DME- bp monitor. Independent at baseline. Does not drive.  Mother provides transportation to \[facility]\"" and will provide transportation home upon DC. Verified insurance on file. Reports that pt takes home medications as prescribed and can currently afford them. Denies recent inpt stay in last 30 days. DC plan is home.     Initial Assessment (most recent)       Adult Discharge Assessment - 02/01/24 1444          Discharge Assessment    Assessment Type Discharge Planning Assessment     Confirmed/corrected address, phone number and insurance Yes     Confirmed  Demographics Correct on Facesheet     Source of Information family     If unable to respond/provide information was family/caregiver contacted? Yes     Contact Name/Number felipe Desir [phone]     Communicated YOLANDA with patient/caregiver Yes     Reason For Admission perforated diverticulum     People in Home parent(s)     Facility Arrived From: home     Do you expect to return to your current living situation? Yes     Do you have help at home or someone to help you manage your care at home? Yes     Prior to hospitilization cognitive status: Alert/Oriented     Current cognitive status: Alert/Oriented     Walking or Climbing Stairs Difficulty no     Dressing/Bathing Difficulty no     Equipment Currently Used at Home blood pressure machine     Readmission within 30 days? No     Patient currently being followed by outpatient case management? No     Do you currently have service(s) that help you manage your care at home? No     Do you take prescription medications? Yes     Do you have prescription coverage? Yes     Do you have any problems affording any of your prescribed medications? No     Is the patient taking medications as prescribed? yes     Who is going to help you get home at discharge? mom     How do you get to doctors appointments? family or friend will provide     Are you on dialysis? No     Do you take coumadin? No     Discharge Plan A Home with family     Discharge Plan B Home     DME Needed Upon Discharge  none     Discharge Plan discussed with: Parent(s)     Transition of Care Barriers None

## 2024-02-01 NOTE — PROGRESS NOTES
[facility] Medicine  Progress Note    Patient Name: Brayden Francois Jr.  MRN: [id number]  Patient Class: IP- Inpatient   Admission Date: 1/31/2024  Length of Stay: 1 days  Attending Physician: Homer Pascual MD  Primary Care Provider: Ibis Rolle NP        Subjective:     Principal Problem:Colostomy in place        HPI:  Brayden Francois is a 34-year-old male who was seen in PACU.  He is postop day 0 colostomy reversal.  He has a patient Dr. Pascual's.  Patient had colostomy secondary to perforated diverticulum.  Previous medical history includes colostomy status, substance abuse history, psychiatric history.  Preop labs and imaging were reviewed.  Patient be admitted to Hospital Medicine for treatment and management.  Will follow Dr. Pascual's recommendations.    Overview/Hospital Course:  No notes on file    Interval History:  Patient seen and examined.  No acute events overnight.  Is tolerating his clear liquid diet well.  Does report that he has had some belching with reflux.  In his past flatus.  He reports his abdominal pain is currently 8/10 scale and described as heaviness.  He feels bloated.  He is currently sitting in the chair during my examination.  Ambulation has been encouraged.    Review of Systems   Gastrointestinal:  Positive for abdominal distention and abdominal pain.   All other systems reviewed and are negative.    Objective:     Vital Signs (Most Recent):  Temp: 98 °F (36.7 °C) (02/01/24 1130)  Pulse: 97 (02/01/24 1130)  Resp: 18 (02/01/24 1130)  BP: (!) 154/91 (02/01/24 1130)  SpO2: 96 % (02/01/24 1130) Vital Signs (24h Range):  Temp:  [97.3 °F (36.3 °C)-98.2 °F (36.8 °C)] 98 °F (36.7 °C)  Pulse:  [] 97  Resp:  [8-20] 18  SpO2:  [91 %-100 %] 96 %  BP: (110-157)/(63-98) 154/91     Weight: 111.6 kg (246 lb)  Body mass index is 35.3 kg/m².    Intake/Output Summary (Last 24 hours) at 2/1/2024 1143  Last data filed at 2/1/2024 0620  Gross per 24 hour    Intake 3890 ml   Output 2600 ml   Net 1290 ml         Physical Exam  Constitutional:       General: He is not in acute distress.     Appearance: He is not ill-appearing, toxic-appearing or diaphoretic.   Cardiovascular:      Rate and Rhythm: Normal rate and regular rhythm.      Pulses: Normal pulses.      Heart sounds: Normal heart sounds.   Pulmonary:      Effort: Pulmonary effort is normal. No respiratory distress.      Breath sounds: Normal breath sounds.   Abdominal:      General: There is distension (mild).      Tenderness: There is abdominal tenderness (appropriately tender). There is no guarding.      Comments: Hypoactive bowel sounds.  Abdominal dressing clean dry and intact.   Skin:     General: Skin is warm and dry.      Coloration: Skin is not pale.   Neurological:      Mental Status: He is alert and oriented to person, place, and time. Mental status is at baseline.             Significant Labs: All pertinent labs within the past 24 hours have been reviewed.  CBC:   Recent Labs   Lab 02/01/24  0701   WBC 15.07*   HGB 13.3*   HCT 41.2        CMP:   Recent Labs   Lab 02/01/24  0701   *   K 4.6      CO2 25   *   BUN 12   CREATININE 0.8   CALCIUM 8.0*   ANIONGAP 9       Significant Imaging: I have reviewed all pertinent imaging results/findings within the past 24 hours.    Assessment/Plan:      * Colostomy in place  Acute problem   Status post colostomy reversal  Postop day 1   Follow general surgery recommendations   Monitor H/H closely   Aggressive Incentive spirometry  Pain control with p.r.n. narcotics   Antiemetics p.r.n.   Encourage early ambulation   Diet per General surgery recommendations  SCDs DVT prophylaxis per General surgery recommendations    Psychosis  Chronic problem  Continue psychiatric medications   Monitor closely        VTE Risk Mitigation (From admission, onward)      None            Discharge Planning   YOLANDA: 2/3/2024     Code Status: Full Code   Is the  patient medically ready for discharge?:     Reason for patient still in hospital (select all that apply): Patient trending condition and Treatment                     Jackie Reynaga NP  Department of Hospital Medicine   [facility]/Surg

## 2024-02-02 LAB
ANION GAP SERPL CALC-SCNC: 10 MMOL/L (ref 8–16)
BUN SERPL-MCNC: 7 MG/DL (ref 6–20)
CALCIUM SERPL-MCNC: 8 MG/DL (ref 8.7–10.5)
CHLORIDE SERPL-SCNC: 102 MMOL/L (ref 95–110)
CO2 SERPL-SCNC: 27 MMOL/L (ref 23–29)
CREAT SERPL-MCNC: 0.8 MG/DL (ref 0.5–1.4)
ERYTHROCYTE [DISTWIDTH] IN BLOOD BY AUTOMATED COUNT: 13.7 % (ref 11.5–14.5)
EST. GFR  (NO RACE VARIABLE): >60 ML/MIN/1.73 M^2
GLUCOSE SERPL-MCNC: 113 MG/DL (ref 70–110)
HCT VFR BLD AUTO: 38.4 % (ref 40–54)
HGB BLD-MCNC: 12.4 G/DL (ref 14–18)
MCH RBC QN AUTO: 27.4 PG (ref 27–31)
MCHC RBC AUTO-ENTMCNC: 32.3 G/DL (ref 32–36)
MCV RBC AUTO: 85 FL (ref 82–98)
PLATELET # BLD AUTO: 253 K/UL (ref 150–450)
PMV BLD AUTO: 8.6 FL (ref 9.2–12.9)
POTASSIUM SERPL-SCNC: 4.4 MMOL/L (ref 3.5–5.1)
RBC # BLD AUTO: 4.52 M/UL (ref 4.6–6.2)
SODIUM SERPL-SCNC: 139 MMOL/L (ref 136–145)
WBC # BLD AUTO: 11.94 K/UL (ref 3.9–12.7)

## 2024-02-02 PROCEDURE — 85027 COMPLETE CBC AUTOMATED: CPT | Performed by: STUDENT IN AN ORGANIZED HEALTH CARE EDUCATION/TRAINING PROGRAM

## 2024-02-02 PROCEDURE — S4991 NICOTINE PATCH NONLEGEND: HCPCS | Performed by: NURSE PRACTITIONER

## 2024-02-02 PROCEDURE — 63600175 PHARM REV CODE 636 W HCPCS: Performed by: STUDENT IN AN ORGANIZED HEALTH CARE EDUCATION/TRAINING PROGRAM

## 2024-02-02 PROCEDURE — 94761 N-INVAS EAR/PLS OXIMETRY MLT: CPT

## 2024-02-02 PROCEDURE — 11000001 HC ACUTE MED/SURG PRIVATE ROOM

## 2024-02-02 PROCEDURE — 80048 BASIC METABOLIC PNL TOTAL CA: CPT | Performed by: STUDENT IN AN ORGANIZED HEALTH CARE EDUCATION/TRAINING PROGRAM

## 2024-02-02 PROCEDURE — 99900035 HC TECH TIME PER 15 MIN (STAT)

## 2024-02-02 PROCEDURE — 25000003 PHARM REV CODE 250: Performed by: NURSE PRACTITIONER

## 2024-02-02 PROCEDURE — 25000003 PHARM REV CODE 250: Performed by: STUDENT IN AN ORGANIZED HEALTH CARE EDUCATION/TRAINING PROGRAM

## 2024-02-02 PROCEDURE — 36415 COLL VENOUS BLD VENIPUNCTURE: CPT | Performed by: STUDENT IN AN ORGANIZED HEALTH CARE EDUCATION/TRAINING PROGRAM

## 2024-02-02 PROCEDURE — 94799 UNLISTED PULMONARY SVC/PX: CPT | Mod: XB

## 2024-02-02 RX ORDER — OXYCODONE AND ACETAMINOPHEN 5; 325 MG/1; MG/1
2 TABLET ORAL EVERY 4 HOURS PRN
Status: DISCONTINUED | OUTPATIENT
Start: 2024-02-02 | End: 2024-02-08 | Stop reason: HOSPADM

## 2024-02-02 RX ORDER — DIPHENHYDRAMINE HCL 25 MG
25 CAPSULE ORAL EVERY 6 HOURS PRN
Status: DISCONTINUED | OUTPATIENT
Start: 2024-02-02 | End: 2024-02-08 | Stop reason: HOSPADM

## 2024-02-02 RX ORDER — OXYCODONE HYDROCHLORIDE 5 MG/1
20 TABLET ORAL EVERY 4 HOURS PRN
Status: DISCONTINUED | OUTPATIENT
Start: 2024-02-02 | End: 2024-02-08 | Stop reason: HOSPADM

## 2024-02-02 RX ADMIN — DIPHENHYDRAMINE HYDROCHLORIDE 25 MG: 25 CAPSULE ORAL at 08:02

## 2024-02-02 RX ADMIN — MUPIROCIN: 20 OINTMENT TOPICAL at 08:02

## 2024-02-02 RX ADMIN — OXYCODONE HYDROCHLORIDE 20 MG: 5 TABLET ORAL at 08:02

## 2024-02-02 RX ADMIN — DIVALPROEX SODIUM 500 MG: 250 TABLET, DELAYED RELEASE ORAL at 08:02

## 2024-02-02 RX ADMIN — AMLODIPINE BESYLATE 10 MG: 5 TABLET ORAL at 08:02

## 2024-02-02 RX ADMIN — PANTOPRAZOLE SODIUM 40 MG: 40 TABLET, DELAYED RELEASE ORAL at 08:02

## 2024-02-02 RX ADMIN — Medication 1 PATCH: at 08:02

## 2024-02-02 RX ADMIN — DIVALPROEX SODIUM 500 MG: 250 TABLET, DELAYED RELEASE ORAL at 03:02

## 2024-02-02 RX ADMIN — MIRTAZAPINE 15 MG: 15 TABLET, FILM COATED ORAL at 08:02

## 2024-02-02 RX ADMIN — RISPERIDONE 2 MG: 1 TABLET ORAL at 08:02

## 2024-02-02 RX ADMIN — OXYCODONE HYDROCHLORIDE 15 MG: 5 TABLET ORAL at 01:02

## 2024-02-02 RX ADMIN — OXYCODONE HYDROCHLORIDE 15 MG: 5 TABLET ORAL at 05:02

## 2024-02-02 RX ADMIN — DULOXETINE HYDROCHLORIDE 60 MG: 30 CAPSULE, DELAYED RELEASE ORAL at 08:02

## 2024-02-02 RX ADMIN — TRAZODONE HYDROCHLORIDE 50 MG: 50 TABLET ORAL at 08:02

## 2024-02-02 RX ADMIN — DIPHENHYDRAMINE HYDROCHLORIDE 25 MG: 25 CAPSULE ORAL at 12:02

## 2024-02-02 RX ADMIN — SODIUM CHLORIDE: 9 INJECTION, SOLUTION INTRAVENOUS at 04:02

## 2024-02-02 RX ADMIN — OXYCODONE HYDROCHLORIDE 20 MG: 5 TABLET ORAL at 04:02

## 2024-02-02 RX ADMIN — HYDROMORPHONE HYDROCHLORIDE 0.5 MG: 2 INJECTION INTRAMUSCULAR; INTRAVENOUS; SUBCUTANEOUS at 04:02

## 2024-02-02 RX ADMIN — OXYCODONE HYDROCHLORIDE 15 MG: 5 TABLET ORAL at 08:02

## 2024-02-02 RX ADMIN — OXYCODONE HYDROCHLORIDE 20 MG: 5 TABLET ORAL at 12:02

## 2024-02-02 RX ADMIN — SODIUM CHLORIDE: 9 INJECTION, SOLUTION INTRAVENOUS at 05:02

## 2024-02-02 RX ADMIN — HYDROMORPHONE HYDROCHLORIDE 0.5 MG: 2 INJECTION INTRAMUSCULAR; INTRAVENOUS; SUBCUTANEOUS at 08:02

## 2024-02-02 NOTE — CARE UPDATE
02/01/24 2007   Patient Assessment/Suction   Level of Consciousness (AVPU) alert   Respiratory Effort Unlabored   PRE-TX-O2   Device (Oxygen Therapy) room air   SpO2 96 %   Pulse Oximetry Type Intermittent   $ Pulse Oximetry - Multiple Charge Pulse Oximetry - Multiple   Pulse 100   Resp 19   Incentive Spirometer   $ Incentive Spirometer Charges done with encouragement;proper technique demonstrated   Administration (IS) proper technique demonstrated   Number of Repetitions (IS) 8   Level Incentive Spirometer (mL) 2500   Patient Tolerance (IS) no adverse signs/symptoms present;good

## 2024-02-02 NOTE — PLAN OF CARE
Plan of care reviewed with patient, verbalized understanding. Tolerating clears. Ambulates in room independently, up in chair. Reports abdominal pain, mild relief obtained with prn IV and oral medication. Hiccups and belching reported. Bloody bowel movement reported by patient. Call light within reach.

## 2024-02-02 NOTE — PROGRESS NOTES
[facility] Medicine  Progress Note    Patient Name: Brayden Francois Jr.  MRN: [id number]  Patient Class: IP- Inpatient   Admission Date: 1/31/2024  Length of Stay: 2 days  Attending Physician: Carlyle Cohen MD  Primary Care Provider: Ibis Rolle NP        Subjective:     Principal Problem:Colostomy in place        HPI:  Brayden Francois is a 34-year-old male who was seen in PACU.  He is postop day 0 colostomy reversal.  He has a patient Dr. Pascual's.  Patient had colostomy secondary to perforated diverticulum.  Previous medical history includes colostomy status, substance abuse history, psychiatric history.  Preop labs and imaging were reviewed.  Patient be admitted to Hospital Medicine for treatment and management.  Will follow Dr. Pascual's recommendations.    Overview/Hospital Course:  No notes on file    Interval History:  Patient seen and examined.  No acute events overnight.  Tolerating clear liquids.  Still has belching, abdominal distention, and abdominal pain.  He states that he had a small bowel movement that was bloody last night.  Not passing much flatus.  No nausea or vomiting.  He reports that his pain is unrelieved with oral pain medicines.  Will adjust oral pain regimen to avoid IV if possible.     Review of Systems   Gastrointestinal:  Positive for abdominal distention and abdominal pain.   All other systems reviewed and are negative.    Objective:     Vital Signs (Most Recent):  Temp: 97.5 °F (36.4 °C) (02/02/24 1156)  Pulse: 100 (02/02/24 1156)  Resp: 18 (02/02/24 1223)  BP: (!) 134/94 (02/02/24 1156)  SpO2: 98 % (02/02/24 1156) Vital Signs (24h Range):  Temp:  [96.5 °F (35.8 °C)-97.9 °F (36.6 °C)] 97.5 °F (36.4 °C)  Pulse:  [] 100  Resp:  [15-19] 18  SpO2:  [89 %-98 %] 98 %  BP: (125-170)/() 134/94     Weight: 111.6 kg (246 lb)  Body mass index is 35.3 kg/m².    Intake/Output Summary (Last 24 hours) at 2/2/2024 1225  Last data filed at 2/2/2024  1017  Gross per 24 hour   Intake 3964.18 ml   Output 1275 ml   Net 2689.18 ml           Physical Exam  Constitutional:       General: He is not in acute distress.     Appearance: He is not ill-appearing, toxic-appearing or diaphoretic.   Cardiovascular:      Rate and Rhythm: Normal rate and regular rhythm.      Pulses: Normal pulses.      Heart sounds: Normal heart sounds.   Pulmonary:      Effort: Pulmonary effort is normal. No respiratory distress.      Breath sounds: Normal breath sounds.   Abdominal:      General: There is distension (mild).      Tenderness: There is abdominal tenderness (appropriately tender). There is no guarding.      Comments: Hypoactive bowel sounds.  Abdominal dressing clean dry and intact.   Skin:     General: Skin is warm and dry.      Coloration: Skin is not pale.   Neurological:      Mental Status: He is alert and oriented to person, place, and time. Mental status is at baseline.             Significant Labs: All pertinent labs within the past 24 hours have been reviewed.  CBC:   Recent Labs   Lab 02/01/24  0701 02/02/24  0425   WBC 15.07* 11.94   HGB 13.3* 12.4*   HCT 41.2 38.4*    253       CMP:   Recent Labs   Lab 02/01/24  0701 02/02/24  0420   * 139   K 4.6 4.4    102   CO2 25 27   * 113*   BUN 12 7   CREATININE 0.8 0.8   CALCIUM 8.0* 8.0*   ANIONGAP 9 10         Significant Imaging: I have reviewed all pertinent imaging results/findings within the past 24 hours.    Assessment/Plan:      * Colostomy in place  Acute problem   Status post colostomy reversal  Postop day 2   Follow general surgery recommendations   Monitor H/H closely   Aggressive Incentive spirometry  Pain control with p.r.n. narcotics   Antiemetics p.r.n.   Encourage early ambulation   Diet per General surgery recommendations  SCDs DVT prophylaxis per General surgery recommendations    Psychosis  Chronic problem  Continue psychiatric medications   Monitor closely        VTE Risk Mitigation  (From admission, onward)           Ordered     Place XUAN hose  Until discontinued         02/01/24 1147                    Discharge Planning   YOLANDA: 2/3/2024     Code Status: Full Code   Is the patient medically ready for discharge?:     Reason for patient still in hospital (select all that apply): Patient trending condition  Discharge Plan A: Home with family                  Jackie Reynaga NP  Department of Hospital Medicine   [facility]/Surg

## 2024-02-02 NOTE — PLAN OF CARE
Problem: Adult Inpatient Plan of Care  Goal: Plan of Care Review  Outcome: Ongoing, Progressing  Goal: Patient-Specific Goal (Individualized)  Outcome: Ongoing, Progressing  Goal: Optimal Comfort and Wellbeing  Outcome: Ongoing, Progressing     Problem: Infection  Goal: Absence of Infection Signs and Symptoms  Outcome: Ongoing, Progressing     Problem: Fall Injury Risk  Goal: Absence of Fall and Fall-Related Injury  Outcome: Ongoing, Progressing     Problem: Bleeding (Bowel Resection)  Goal: Absence of Bleeding  Outcome: Ongoing, Progressing     Problem: Bowel Motility Impaired (Bowel Resection)  Goal: Effective Bowel Motility and Elimination  Outcome: Ongoing, Progressing     Problem: Infection (Bowel Resection)  Goal: Absence of Infection Signs and Symptoms  Outcome: Ongoing, Progressing     Problem: Ongoing Anesthesia Effects (Bowel Resection)  Goal: Anesthesia/Sedation Recovery  Outcome: Ongoing, Progressing     Problem: Pain (Bowel Resection)  Goal: Acceptable Pain Control  Outcome: Ongoing, Progressing     Problem: Postoperative Nausea and Vomiting (Bowel Resection)  Goal: Nausea and Vomiting Relief  Outcome: Ongoing, Progressing     Problem: Postoperative Urinary Retention (Bowel Resection)  Goal: Effective Urinary Elimination  Outcome: Ongoing, Progressing     Problem: Respiratory Compromise (Bowel Resection)  Goal: Effective Oxygenation and Ventilation  Outcome: Ongoing, Progressing     Problem: Pain Acute  Goal: Acceptable Pain Control and Functional Ability  Outcome: Ongoing, Progressing

## 2024-02-02 NOTE — SUBJECTIVE & OBJECTIVE
Interval History:  Patient seen and examined.  No acute events overnight.  Tolerating clear liquids.  Still has belching, abdominal distention, and abdominal pain.  He states that he had a small bowel movement that was bloody last night.  Not passing much flatus.  No nausea or vomiting.  He reports that his pain is unrelieved with oral pain medicines.  Will adjust oral pain regimen to avoid IV if possible.     Review of Systems   Gastrointestinal:  Positive for abdominal distention and abdominal pain.   All other systems reviewed and are negative.    Objective:     Vital Signs (Most Recent):  Temp: 97.5 °F (36.4 °C) (02/02/24 1156)  Pulse: 100 (02/02/24 1156)  Resp: 18 (02/02/24 1223)  BP: (!) 134/94 (02/02/24 1156)  SpO2: 98 % (02/02/24 1156) Vital Signs (24h Range):  Temp:  [96.5 °F (35.8 °C)-97.9 °F (36.6 °C)] 97.5 °F (36.4 °C)  Pulse:  [] 100  Resp:  [15-19] 18  SpO2:  [89 %-98 %] 98 %  BP: (125-170)/() 134/94     Weight: 111.6 kg (246 lb)  Body mass index is 35.3 kg/m².    Intake/Output Summary (Last 24 hours) at 2/2/2024 1225  Last data filed at 2/2/2024 1017  Gross per 24 hour   Intake 3964.18 ml   Output 1275 ml   Net 2689.18 ml           Physical Exam  Constitutional:       General: He is not in acute distress.     Appearance: He is not ill-appearing, toxic-appearing or diaphoretic.   Cardiovascular:      Rate and Rhythm: Normal rate and regular rhythm.      Pulses: Normal pulses.      Heart sounds: Normal heart sounds.   Pulmonary:      Effort: Pulmonary effort is normal. No respiratory distress.      Breath sounds: Normal breath sounds.   Abdominal:      General: There is distension (mild).      Tenderness: There is abdominal tenderness (appropriately tender). There is no guarding.      Comments: Hypoactive bowel sounds.  Abdominal dressing clean dry and intact.   Skin:     General: Skin is warm and dry.      Coloration: Skin is not pale.   Neurological:      Mental Status: He is alert and  oriented to person, place, and time. Mental status is at baseline.             Significant Labs: All pertinent labs within the past 24 hours have been reviewed.  CBC:   Recent Labs   Lab 02/01/24  0701 02/02/24  0425   WBC 15.07* 11.94   HGB 13.3* 12.4*   HCT 41.2 38.4*    253       CMP:   Recent Labs   Lab 02/01/24  0701 02/02/24  0420   * 139   K 4.6 4.4    102   CO2 25 27   * 113*   BUN 12 7   CREATININE 0.8 0.8   CALCIUM 8.0* 8.0*   ANIONGAP 9 10         Significant Imaging: I have reviewed all pertinent imaging results/findings within the past 24 hours.

## 2024-02-02 NOTE — PLAN OF CARE
Plan of care reviewed with patient. Patient verbalized complete understanding. Advanced to and tolerating full liquids. PRN medications administered for pain control. IVF infusing as ordered. All fall precautions maintained. Bed in lowest position, locked, call light within reach. Side rails up x's 2. Slip resistant socks maintained.

## 2024-02-02 NOTE — PROGRESS NOTES
Patient seen and examined.  Resting comfortably in bed.  Had a small bowel movement with some blood.    Vitals are stable, mildly tachycardic  Afebrile  Abdomen is appropriately tender, mild distention    Labs reviewed, leukocytosis normalized    Okay to advance to full liquids.  Start trending CRP tomorrow.

## 2024-02-03 LAB
ANION GAP SERPL CALC-SCNC: 9 MMOL/L (ref 8–16)
BUN SERPL-MCNC: 4 MG/DL (ref 6–20)
CALCIUM SERPL-MCNC: 8.4 MG/DL (ref 8.7–10.5)
CHLORIDE SERPL-SCNC: 101 MMOL/L (ref 95–110)
CO2 SERPL-SCNC: 27 MMOL/L (ref 23–29)
CREAT SERPL-MCNC: 0.7 MG/DL (ref 0.5–1.4)
CRP SERPL-MCNC: 158.3 MG/L (ref 0–8.2)
ERYTHROCYTE [DISTWIDTH] IN BLOOD BY AUTOMATED COUNT: 13.5 % (ref 11.5–14.5)
EST. GFR  (NO RACE VARIABLE): >60 ML/MIN/1.73 M^2
GLUCOSE SERPL-MCNC: 114 MG/DL (ref 70–110)
HCT VFR BLD AUTO: 33.2 % (ref 40–54)
HGB BLD-MCNC: 10.8 G/DL (ref 14–18)
MCH RBC QN AUTO: 27.5 PG (ref 27–31)
MCHC RBC AUTO-ENTMCNC: 32.5 G/DL (ref 32–36)
MCV RBC AUTO: 85 FL (ref 82–98)
PLATELET # BLD AUTO: 255 K/UL (ref 150–450)
PMV BLD AUTO: 8.7 FL (ref 9.2–12.9)
POTASSIUM SERPL-SCNC: 3.6 MMOL/L (ref 3.5–5.1)
RBC # BLD AUTO: 3.93 M/UL (ref 4.6–6.2)
SODIUM SERPL-SCNC: 137 MMOL/L (ref 136–145)
WBC # BLD AUTO: 9.99 K/UL (ref 3.9–12.7)

## 2024-02-03 PROCEDURE — 36415 COLL VENOUS BLD VENIPUNCTURE: CPT | Performed by: STUDENT IN AN ORGANIZED HEALTH CARE EDUCATION/TRAINING PROGRAM

## 2024-02-03 PROCEDURE — 25000003 PHARM REV CODE 250: Performed by: NURSE PRACTITIONER

## 2024-02-03 PROCEDURE — 85027 COMPLETE CBC AUTOMATED: CPT | Performed by: STUDENT IN AN ORGANIZED HEALTH CARE EDUCATION/TRAINING PROGRAM

## 2024-02-03 PROCEDURE — 11000001 HC ACUTE MED/SURG PRIVATE ROOM

## 2024-02-03 PROCEDURE — S4991 NICOTINE PATCH NONLEGEND: HCPCS | Performed by: NURSE PRACTITIONER

## 2024-02-03 PROCEDURE — 86140 C-REACTIVE PROTEIN: CPT | Performed by: STUDENT IN AN ORGANIZED HEALTH CARE EDUCATION/TRAINING PROGRAM

## 2024-02-03 PROCEDURE — 94799 UNLISTED PULMONARY SVC/PX: CPT | Mod: XB

## 2024-02-03 PROCEDURE — 25000003 PHARM REV CODE 250: Performed by: STUDENT IN AN ORGANIZED HEALTH CARE EDUCATION/TRAINING PROGRAM

## 2024-02-03 PROCEDURE — 80048 BASIC METABOLIC PNL TOTAL CA: CPT | Performed by: STUDENT IN AN ORGANIZED HEALTH CARE EDUCATION/TRAINING PROGRAM

## 2024-02-03 PROCEDURE — 99900035 HC TECH TIME PER 15 MIN (STAT)

## 2024-02-03 PROCEDURE — 94761 N-INVAS EAR/PLS OXIMETRY MLT: CPT

## 2024-02-03 RX ADMIN — OXYCODONE HYDROCHLORIDE 20 MG: 5 TABLET ORAL at 08:02

## 2024-02-03 RX ADMIN — DULOXETINE HYDROCHLORIDE 60 MG: 30 CAPSULE, DELAYED RELEASE ORAL at 08:02

## 2024-02-03 RX ADMIN — Medication 1 PATCH: at 08:02

## 2024-02-03 RX ADMIN — DIVALPROEX SODIUM 500 MG: 250 TABLET, DELAYED RELEASE ORAL at 03:02

## 2024-02-03 RX ADMIN — SODIUM CHLORIDE: 9 INJECTION, SOLUTION INTRAVENOUS at 04:02

## 2024-02-03 RX ADMIN — PANTOPRAZOLE SODIUM 40 MG: 40 TABLET, DELAYED RELEASE ORAL at 08:02

## 2024-02-03 RX ADMIN — DIVALPROEX SODIUM 500 MG: 250 TABLET, DELAYED RELEASE ORAL at 08:02

## 2024-02-03 RX ADMIN — DIPHENHYDRAMINE HYDROCHLORIDE 25 MG: 25 CAPSULE ORAL at 04:02

## 2024-02-03 RX ADMIN — OXYCODONE HYDROCHLORIDE 20 MG: 5 TABLET ORAL at 12:02

## 2024-02-03 RX ADMIN — TRAZODONE HYDROCHLORIDE 50 MG: 50 TABLET ORAL at 08:02

## 2024-02-03 RX ADMIN — OXYCODONE HYDROCHLORIDE 20 MG: 5 TABLET ORAL at 04:02

## 2024-02-03 RX ADMIN — AMLODIPINE BESYLATE 10 MG: 5 TABLET ORAL at 08:02

## 2024-02-03 RX ADMIN — MUPIROCIN: 20 OINTMENT TOPICAL at 08:02

## 2024-02-03 RX ADMIN — MIRTAZAPINE 15 MG: 15 TABLET, FILM COATED ORAL at 08:02

## 2024-02-03 RX ADMIN — SODIUM CHLORIDE: 9 INJECTION, SOLUTION INTRAVENOUS at 06:02

## 2024-02-03 RX ADMIN — RISPERIDONE 2 MG: 1 TABLET ORAL at 08:02

## 2024-02-03 NOTE — CARE UPDATE
02/02/24 1957   Patient Assessment/Suction   Level of Consciousness (AVPU) alert   Respiratory Effort Unlabored   Expansion/Accessory Muscles/Retractions no use of accessory muscles;no retractions   PRE-TX-O2   Device (Oxygen Therapy) room air   SpO2 (!) 94 %   Pulse Oximetry Type Intermittent   $ Pulse Oximetry - Multiple Charge Pulse Oximetry - Multiple   Pulse (!) 111   Resp 18   Incentive Spirometer   $ Incentive Spirometer Charges done with encouragement;proper technique demonstrated   Administration (IS) proper technique demonstrated   Number of Repetitions (IS) 10   Level Incentive Spirometer (mL) 3000   Patient Tolerance (IS) good;no adverse signs/symptoms present        02/02/24 1957   Patient Assessment/Suction   Level of Consciousness (AVPU) alert   Respiratory Effort Unlabored   Expansion/Accessory Muscles/Retractions no use of accessory muscles;no retractions   PRE-TX-O2   Device (Oxygen Therapy) room air   SpO2 (!) 94 %   Pulse Oximetry Type Intermittent   $ Pulse Oximetry - Multiple Charge Pulse Oximetry - Multiple   Pulse (!) 111   Resp 18   Incentive Spirometer   $ Incentive Spirometer Charges done with encouragement;proper technique demonstrated   Administration (IS) proper technique demonstrated   Number of Repetitions (IS) 10   Level Incentive Spirometer (mL) 3000   Patient Tolerance (IS) good;no adverse signs/symptoms present

## 2024-02-03 NOTE — ASSESSMENT & PLAN NOTE
Acute problem   Status post colostomy reversal  Postop day 3  Follow general surgery recommendations   Monitor H/H closely   Aggressive Incentive spirometry  Pain control with p.r.n. narcotics   Antiemetics p.r.n.   Encourage early ambulation   Diet per General surgery recommendations  SCDs DVT prophylaxis per General surgery recommendations

## 2024-02-03 NOTE — PROGRESS NOTES
Patient seen and examined.  Passing gas but no other bowel function.      Vitals are stable  Afebrile  Abdomen is appropriately tender, distended     Labs reviewed, no leukocytosis, CRP is about 150    No significant changes from surgical perspective.  Given distention, leave on full liquids today and discuss going very slowly.  Okay to back down to clears or NPO if patient becomes nauseous.    Trend out CRP   Ambulate  Dressing changes as needed  Okay to shower  Following

## 2024-02-03 NOTE — PROGRESS NOTES
[facility] Medicine  Progress Note    Patient Name: Brayden Francois Jr.  MRN: [id number]  Patient Class: IP- Inpatient   Admission Date: 1/31/2024  Length of Stay: 3 days  Attending Physician: Roya Mackenzie MD  Primary Care Provider: Ibis Rolle NP        Subjective:     Principal Problem:Colostomy in place        HPI:  Brayden Francois is a 34-year-old male who was seen in PACU.  He is postop day 0 colostomy reversal.  He has a patient Dr. Pascual's.  Patient had colostomy secondary to perforated diverticulum.  Previous medical history includes colostomy status, substance abuse history, psychiatric history.  Preop labs and imaging were reviewed.  Patient be admitted to Hospital Medicine for treatment and management.  Will follow Dr. Pascual's recommendations.    Overview/Hospital Course:  No notes on file    Interval History:  Patient seen and examined.  No acute events overnight.  Tolerating full liquids.  Still with belching.  States he is passing flatus.  He has not had any further bowel movements.  Reports that his pain is better controlled today.  He is not required IV pain medicines since adjusting his oral medications.    Review of Systems   Gastrointestinal:  Positive for abdominal distention and abdominal pain.   All other systems reviewed and are negative.    Objective:     Vital Signs (Most Recent):  Temp: 98.5 °F (36.9 °C) (02/03/24 0746)  Pulse: 97 (02/03/24 0746)  Resp: 16 (02/03/24 0834)  BP: 137/85 (02/03/24 0746)  SpO2: (!) 92 % (02/03/24 0746) Vital Signs (24h Range):  Temp:  [97.5 °F (36.4 °C)-98.5 °F (36.9 °C)] 98.5 °F (36.9 °C)  Pulse:  [] 97  Resp:  [16-20] 16  SpO2:  [91 %-98 %] 92 %  BP: (134-174)/() 137/85     Weight: 111.6 kg (246 lb)  Body mass index is 35.3 kg/m².    Intake/Output Summary (Last 24 hours) at 2/3/2024 1103  Last data filed at 2/3/2024 0500  Gross per 24 hour   Intake 2496.98 ml   Output 1600 ml   Net 896.98 ml            Physical Exam  Constitutional:       General: He is not in acute distress.     Appearance: He is not ill-appearing, toxic-appearing or diaphoretic.   Cardiovascular:      Rate and Rhythm: Normal rate and regular rhythm.      Pulses: Normal pulses.      Heart sounds: Normal heart sounds.   Pulmonary:      Effort: Pulmonary effort is normal. No respiratory distress.      Breath sounds: Normal breath sounds.   Abdominal:      General: There is distension (mild).      Tenderness: There is abdominal tenderness (appropriately tender). There is no guarding.      Comments: Hypoactive bowel sounds.  Abdominal dressing clean dry and intact.   Skin:     General: Skin is warm and dry.      Coloration: Skin is not pale.   Neurological:      Mental Status: He is alert and oriented to person, place, and time. Mental status is at baseline.             Significant Labs: All pertinent labs within the past 24 hours have been reviewed.  CBC:   Recent Labs   Lab 02/02/24 0425 02/03/24  0435   WBC 11.94 9.99   HGB 12.4* 10.8*   HCT 38.4* 33.2*    255       CMP:   Recent Labs   Lab 02/02/24 0420 02/03/24  0435    137   K 4.4 3.6    101   CO2 27 27   * 114*   BUN 7 4*   CREATININE 0.8 0.7   CALCIUM 8.0* 8.4*   ANIONGAP 10 9         Significant Imaging: I have reviewed all pertinent imaging results/findings within the past 24 hours.    Assessment/Plan:      * Colostomy in place  Acute problem   Status post colostomy reversal  Postop day 3  Follow general surgery recommendations   Monitor H/H closely   Aggressive Incentive spirometry  Pain control with p.r.n. narcotics   Antiemetics p.r.n.   Encourage early ambulation   Diet per General surgery recommendations  SCDs DVT prophylaxis per General surgery recommendations       Psychosis  Chronic problem  Continue psychiatric medications   Monitor closely        VTE Risk Mitigation (From admission, onward)           Ordered     Place XUAN hose  Until  discontinued         02/01/24 1147                    Discharge Planning   YOLANDA: 2/3/2024     Code Status: Full Code   Is the patient medically ready for discharge?:     Reason for patient still in hospital (select all that apply): Patient trending condition  Discharge Plan A: Home with family                  Jackie Reynaga NP  Department of Hospital Medicine   [facility]/Surg

## 2024-02-03 NOTE — SUBJECTIVE & OBJECTIVE
Interval History:  Patient seen and examined.  No acute events overnight.  Tolerating full liquids.  Still with belching.  States he is passing flatus.  He has not had any further bowel movements.  Reports that his pain is better controlled today.  He is not required IV pain medicines since adjusting his oral medications.    Review of Systems   Gastrointestinal:  Positive for abdominal distention and abdominal pain.   All other systems reviewed and are negative.    Objective:     Vital Signs (Most Recent):  Temp: 98.5 °F (36.9 °C) (02/03/24 0746)  Pulse: 97 (02/03/24 0746)  Resp: 16 (02/03/24 0834)  BP: 137/85 (02/03/24 0746)  SpO2: (!) 92 % (02/03/24 0746) Vital Signs (24h Range):  Temp:  [97.5 °F (36.4 °C)-98.5 °F (36.9 °C)] 98.5 °F (36.9 °C)  Pulse:  [] 97  Resp:  [16-20] 16  SpO2:  [91 %-98 %] 92 %  BP: (134-174)/() 137/85     Weight: 111.6 kg (246 lb)  Body mass index is 35.3 kg/m².    Intake/Output Summary (Last 24 hours) at 2/3/2024 1103  Last data filed at 2/3/2024 0500  Gross per 24 hour   Intake 2496.98 ml   Output 1600 ml   Net 896.98 ml           Physical Exam  Constitutional:       General: He is not in acute distress.     Appearance: He is not ill-appearing, toxic-appearing or diaphoretic.   Cardiovascular:      Rate and Rhythm: Normal rate and regular rhythm.      Pulses: Normal pulses.      Heart sounds: Normal heart sounds.   Pulmonary:      Effort: Pulmonary effort is normal. No respiratory distress.      Breath sounds: Normal breath sounds.   Abdominal:      General: There is distension (mild).      Tenderness: There is abdominal tenderness (appropriately tender). There is no guarding.      Comments: Hypoactive bowel sounds.  Abdominal dressing clean dry and intact.   Skin:     General: Skin is warm and dry.      Coloration: Skin is not pale.   Neurological:      Mental Status: He is alert and oriented to person, place, and time. Mental status is at baseline.             Significant  Labs: All pertinent labs within the past 24 hours have been reviewed.  CBC:   Recent Labs   Lab 02/02/24  0425 02/03/24  0435   WBC 11.94 9.99   HGB 12.4* 10.8*   HCT 38.4* 33.2*    255       CMP:   Recent Labs   Lab 02/02/24  0420 02/03/24  0435    137   K 4.4 3.6    101   CO2 27 27   * 114*   BUN 7 4*   CREATININE 0.8 0.7   CALCIUM 8.0* 8.4*   ANIONGAP 10 9         Significant Imaging: I have reviewed all pertinent imaging results/findings within the past 24 hours.

## 2024-02-03 NOTE — PLAN OF CARE
Problem: Adult Inpatient Plan of Care  Goal: Plan of Care Review  Outcome: Ongoing, Progressing  Goal: Patient-Specific Goal (Individualized)  Outcome: Ongoing, Progressing  Goal: Absence of Hospital-Acquired Illness or Injury  Outcome: Ongoing, Progressing  Goal: Optimal Comfort and Wellbeing  Outcome: Ongoing, Progressing  Goal: Readiness for Transition of Care  Outcome: Ongoing, Progressing     Problem: Infection  Goal: Absence of Infection Signs and Symptoms  Outcome: Ongoing, Progressing     Problem: Fall Injury Risk  Goal: Absence of Fall and Fall-Related Injury  Outcome: Ongoing, Progressing     Problem: Pain (Bowel Resection)  Goal: Acceptable Pain Control  Outcome: Ongoing, Progressing

## 2024-02-03 NOTE — RESPIRATORY THERAPY
02 saturation 91% on room .  Patient performed IS and saturation increased to 96%.  Encouraged patient to use IS every 2-3hrs.  Patient averaging 3000ml on IS.

## 2024-02-04 LAB
ANION GAP SERPL CALC-SCNC: 8 MMOL/L (ref 8–16)
BUN SERPL-MCNC: 3 MG/DL (ref 6–20)
CALCIUM SERPL-MCNC: 8.5 MG/DL (ref 8.7–10.5)
CHLORIDE SERPL-SCNC: 101 MMOL/L (ref 95–110)
CO2 SERPL-SCNC: 28 MMOL/L (ref 23–29)
CREAT SERPL-MCNC: 0.7 MG/DL (ref 0.5–1.4)
CRP SERPL-MCNC: 116.5 MG/L (ref 0–8.2)
ERYTHROCYTE [DISTWIDTH] IN BLOOD BY AUTOMATED COUNT: 13.7 % (ref 11.5–14.5)
EST. GFR  (NO RACE VARIABLE): >60 ML/MIN/1.73 M^2
GLUCOSE SERPL-MCNC: 116 MG/DL (ref 70–110)
HCT VFR BLD AUTO: 33.6 % (ref 40–54)
HGB BLD-MCNC: 10.9 G/DL (ref 14–18)
MCH RBC QN AUTO: 27.2 PG (ref 27–31)
MCHC RBC AUTO-ENTMCNC: 32.4 G/DL (ref 32–36)
MCV RBC AUTO: 84 FL (ref 82–98)
PLATELET # BLD AUTO: 277 K/UL (ref 150–450)
PMV BLD AUTO: 8.8 FL (ref 9.2–12.9)
POTASSIUM SERPL-SCNC: 3.9 MMOL/L (ref 3.5–5.1)
RBC # BLD AUTO: 4.01 M/UL (ref 4.6–6.2)
SODIUM SERPL-SCNC: 137 MMOL/L (ref 136–145)
WBC # BLD AUTO: 9.51 K/UL (ref 3.9–12.7)

## 2024-02-04 PROCEDURE — 36415 COLL VENOUS BLD VENIPUNCTURE: CPT | Performed by: STUDENT IN AN ORGANIZED HEALTH CARE EDUCATION/TRAINING PROGRAM

## 2024-02-04 PROCEDURE — 99900035 HC TECH TIME PER 15 MIN (STAT)

## 2024-02-04 PROCEDURE — 80048 BASIC METABOLIC PNL TOTAL CA: CPT | Performed by: STUDENT IN AN ORGANIZED HEALTH CARE EDUCATION/TRAINING PROGRAM

## 2024-02-04 PROCEDURE — 25000003 PHARM REV CODE 250: Performed by: NURSE PRACTITIONER

## 2024-02-04 PROCEDURE — 85027 COMPLETE CBC AUTOMATED: CPT | Performed by: STUDENT IN AN ORGANIZED HEALTH CARE EDUCATION/TRAINING PROGRAM

## 2024-02-04 PROCEDURE — 94799 UNLISTED PULMONARY SVC/PX: CPT | Mod: XB

## 2024-02-04 PROCEDURE — S4991 NICOTINE PATCH NONLEGEND: HCPCS | Performed by: NURSE PRACTITIONER

## 2024-02-04 PROCEDURE — 94761 N-INVAS EAR/PLS OXIMETRY MLT: CPT

## 2024-02-04 PROCEDURE — 11000001 HC ACUTE MED/SURG PRIVATE ROOM

## 2024-02-04 PROCEDURE — 86140 C-REACTIVE PROTEIN: CPT | Performed by: STUDENT IN AN ORGANIZED HEALTH CARE EDUCATION/TRAINING PROGRAM

## 2024-02-04 PROCEDURE — 63600175 PHARM REV CODE 636 W HCPCS: Performed by: STUDENT IN AN ORGANIZED HEALTH CARE EDUCATION/TRAINING PROGRAM

## 2024-02-04 PROCEDURE — 25000003 PHARM REV CODE 250: Performed by: STUDENT IN AN ORGANIZED HEALTH CARE EDUCATION/TRAINING PROGRAM

## 2024-02-04 PROCEDURE — 97161 PT EVAL LOW COMPLEX 20 MIN: CPT

## 2024-02-04 RX ADMIN — OXYCODONE HYDROCHLORIDE 20 MG: 5 TABLET ORAL at 10:02

## 2024-02-04 RX ADMIN — DULOXETINE HYDROCHLORIDE 60 MG: 30 CAPSULE, DELAYED RELEASE ORAL at 10:02

## 2024-02-04 RX ADMIN — ONDANSETRON 4 MG: 2 INJECTION INTRAMUSCULAR; INTRAVENOUS at 09:02

## 2024-02-04 RX ADMIN — ONDANSETRON 4 MG: 2 INJECTION INTRAMUSCULAR; INTRAVENOUS at 02:02

## 2024-02-04 RX ADMIN — OXYCODONE HYDROCHLORIDE 20 MG: 5 TABLET ORAL at 02:02

## 2024-02-04 RX ADMIN — MUPIROCIN: 20 OINTMENT TOPICAL at 08:02

## 2024-02-04 RX ADMIN — DIPHENHYDRAMINE HYDROCHLORIDE 25 MG: 25 CAPSULE ORAL at 06:02

## 2024-02-04 RX ADMIN — DIVALPROEX SODIUM 500 MG: 250 TABLET, DELAYED RELEASE ORAL at 02:02

## 2024-02-04 RX ADMIN — DIPHENHYDRAMINE HYDROCHLORIDE 25 MG: 25 CAPSULE ORAL at 07:02

## 2024-02-04 RX ADMIN — MIRTAZAPINE 15 MG: 15 TABLET, FILM COATED ORAL at 10:02

## 2024-02-04 RX ADMIN — TRAZODONE HYDROCHLORIDE 50 MG: 50 TABLET ORAL at 10:02

## 2024-02-04 RX ADMIN — DULOXETINE HYDROCHLORIDE 60 MG: 30 CAPSULE, DELAYED RELEASE ORAL at 08:02

## 2024-02-04 RX ADMIN — AMLODIPINE BESYLATE 10 MG: 5 TABLET ORAL at 08:02

## 2024-02-04 RX ADMIN — Medication 1 PATCH: at 08:02

## 2024-02-04 RX ADMIN — DIVALPROEX SODIUM 500 MG: 250 TABLET, DELAYED RELEASE ORAL at 08:02

## 2024-02-04 RX ADMIN — OXYCODONE HYDROCHLORIDE 20 MG: 5 TABLET ORAL at 06:02

## 2024-02-04 RX ADMIN — RISPERIDONE 2 MG: 1 TABLET ORAL at 10:02

## 2024-02-04 RX ADMIN — MUPIROCIN: 20 OINTMENT TOPICAL at 10:02

## 2024-02-04 RX ADMIN — PANTOPRAZOLE SODIUM 40 MG: 40 TABLET, DELAYED RELEASE ORAL at 08:02

## 2024-02-04 RX ADMIN — DIVALPROEX SODIUM 500 MG: 250 TABLET, DELAYED RELEASE ORAL at 10:02

## 2024-02-04 NOTE — ASSESSMENT & PLAN NOTE
Acute problem   Status post colostomy reversal  Postop day 3  Follow general surgery recommendations   Monitor H/H closely   Aggressive Incentive spirometry  Pain control with p.r.n. narcotics   Antiemetics p.r.n.   Encourage early ambulation   Diet per General surgery recommendations  SCDs DVT prophylaxis per General surgery recommendations   --> 116

## 2024-02-04 NOTE — PROGRESS NOTES
Patient seen and examined.  Reports having bowel function.  Still distended.  Still with similar pain.      Vitals are stable   Afebrile  Abdomen is distended, tender, stable     Labs reviewed, CRP is slightly decreased   No leukocytosis     No significant changes from surgical perspective.  Patient seems to be eating some solid food.  Likely plan to progress to low residue diet tomorrow and possible DC tomorrow afternoon as long as he continues to improve.

## 2024-02-04 NOTE — CARE UPDATE
02/03/24 1929   Patient Assessment/Suction   Level of Consciousness (AVPU) alert   Respiratory Effort Unlabored   Expansion/Accessory Muscles/Retractions no retractions;no use of accessory muscles   PRE-TX-O2   Device (Oxygen Therapy) room air   SpO2 98 %   Pulse Oximetry Type Intermittent   $ Pulse Oximetry - Multiple Charge Pulse Oximetry - Multiple   Pulse (!) 123   Resp 20   Incentive Spirometer   $ Incentive Spirometer Charges done with encouragement;proper technique demonstrated   Administration (IS) proper technique demonstrated   Number of Repetitions (IS) 10   Level Incentive Spirometer (mL) 3000   Patient Tolerance (IS) good;no adverse signs/symptoms present

## 2024-02-04 NOTE — PT/OT/SLP EVAL
Physical Therapy Evaluation    Patient Name:  Brayden Francois Jr.   MRN:  [id number]    Recommendations:     Discharge Recommendations: No Therapy Indicated   Discharge Equipment Recommendations:  (TBD (may benefit from walker))     Assessment:     Brayden Francois Jr. is a 34 y.o. male admitted with a medical diagnosis of Colostomy in place.  He presents with the following impairments/functional limitations: weakness, impaired endurance, impaired balance, gait instability, impaired functional mobility, decreased lower extremity function, impaired skin  .    Rehab Prognosis: Good; patient would benefit from acute skilled PT services to address these deficits and reach maximum level of function.    Recent Surgery: Procedure(s) (LRB):  LAPAROTOMY, EXPLORATORY (N/A)  CLOSURE, COLOSTOMY, LAPAROSCOPIC (N/A) 4 Days Post-Op    Plan:     During this hospitalization, patient to be seen 6 x/week to address the identified rehab impairments via gait training, therapeutic activities, therapeutic exercises and progress toward the following goals:    Plan of Care Expires:  02/15/24    Subjective     Patient/Family Comments/goals: agrees to work with PT to walk in [place]    Living Environment:  House with 1 small step to enter  Prior to admission, patients level of function was independent without AD.  Equipment used at home: none.  DME owned (not currently used): none.  Upon discharge, patient will have assistance from family.    Objective:     Communicated with RN (Sivakumar) prior to session.  Patient found HOB elevated with peripheral IV  upon PT entry to room.    General Precautions: Standard, fall  Orthopedic Precautions:N/A   Braces: N/A  Respiratory Status: Room air    Functional Mobility training:  Bed Mobility:     Rolling Right: stand by assistance  Supine to Sit: stand by assistance  Transfers:     Sit to Stand:  stand by assistance with rolling walker  Gait: 250' with RW with CGA (and assist for IV pole)      AM-PAC 6 CLICK  MOBILITY  Total Score:18       Treatment & Education:  Mobility training as above with cues for technique    Patient left up in chair with all lines intact, call button in reach, and RN present.    GOALS:   Multidisciplinary Problems       Physical Therapy Goals          Problem: Physical Therapy    Goal Priority Disciplines Outcome Goal Variances Interventions   Physical Therapy Goal     PT, PT/OT Ongoing, Progressing     Description: Goals to be met by: 02/15/2024     Patient will increase functional independence with mobility by performin). Supine to sit with Modified [place]  2). Sit to supine with Modified [place]  3). Sit to stand transfer with Modified [place]  4). Gait  x > 200 feet with Modified [place]                          History:     Past Medical History:   Diagnosis Date    Diverticulitis     Hypertension     Psychoactive substance-induced psychosis     Seizures        Past Surgical History:   Procedure Laterality Date    APPENDECTOMY      COLECTOMY, SIGMOID N/A 2023    Procedure: COLECTOMY, SIGMOID;  Surgeon: Homer Pascual MD;  Location: [facility] OR;  Service: General;  Laterality: N/A;    COLONOSCOPY N/A 2023    Procedure: COLONOSCOPY;  Surgeon: True Collier MD;  Location: [facility];  Service: Endoscopy;  Laterality: N/A;    COLOSTOMY N/A 2023    Procedure: CREATION, COLOSTOMY;  Surgeon: Homer Pascual MD;  Location: [facility] OR;  Service: General;  Laterality: N/A;    FRACTURE SURGERY      LAPAROSCOPIC CLOSURE OF COLOSTOMY N/A 2024    Procedure: CLOSURE, COLOSTOMY, LAPAROSCOPIC;  Surgeon: Homer Pascual MD;  Location: [facility] OR;  Service: General;  Laterality: N/A;    LAPAROTOMY, EXPLORATORY N/A 2023    Procedure: LAPAROTOMY, EXPLORATORY;  Surgeon: Homer Pascual MD;  Location: [facility] OR;  Service: General;  Laterality: N/A;    LAPAROTOMY, EXPLORATORY N/A 2024    Procedure: LAPAROTOMY, EXPLORATORY;  Surgeon: Homer Pascual MD;   Location: [facility] OR;  Service: General;  Laterality: N/A;    TONSILLECTOMY      WASHOUT N/A 9/20/2023    Procedure: ABDOMINAL WASHOUT;  Surgeon: Homer Pascual MD;  Location: [facility] OR;  Service: General;  Laterality: N/A;       Time Tracking:     PT Received On: 02/04/24  PT Start Time: 1035     PT Stop Time: 1050  PT Total Time (min): 15 min     Billable Minutes: Evaluation 15      02/04/2024

## 2024-02-04 NOTE — PLAN OF CARE
Problem: Adult Inpatient Plan of Care  Goal: Plan of Care Review  Outcome: Ongoing, Progressing  Goal: Patient-Specific Goal (Individualized)  Outcome: Ongoing, Progressing  Goal: Absence of Hospital-Acquired Illness or Injury  Outcome: Ongoing, Progressing  Goal: Optimal Comfort and Wellbeing  Outcome: Ongoing, Progressing  Goal: Readiness for Transition of Care  Outcome: Ongoing, Progressing     Problem: Infection  Goal: Absence of Infection Signs and Symptoms  Outcome: Ongoing, Progressing     Problem: Fall Injury Risk  Goal: Absence of Fall and Fall-Related Injury  Outcome: Ongoing, Progressing     Problem: Bowel Motility Impaired (Bowel Resection)  Goal: Effective Bowel Motility and Elimination  Outcome: Ongoing, Progressing     Problem: Fluid and Electrolyte Imbalance (Bowel Resection)  Goal: Fluid and Electrolyte Balance  Outcome: Ongoing, Progressing     Problem: Infection (Bowel Resection)  Goal: Absence of Infection Signs and Symptoms  Outcome: Ongoing, Progressing     Problem: Pain (Bowel Resection)  Goal: Acceptable Pain Control  Outcome: Ongoing, Progressing

## 2024-02-04 NOTE — PLAN OF CARE
Plan of care reviewed with patient. Patient verbalized complete understanding. Advanced to and tolerating full liquids. PRN medications administered for pain control. All fall precautions maintained. Bed in lowest position, locked, call light within reach. Side rails up x's 2. Slip resistant socks maintained.

## 2024-02-04 NOTE — NURSING
"Patient medicated with Oxycodone 20mg as ordered for complaints of surgical pain. Bed alarm refused at this time per patient. Informed about bed alarm, patient refused at this time. Agreed to sign AMA form for bed alarm. Patient then signed form. Patients mother at bedside asked why he had to sign a form. Explained to her about hospital policy of bed alarm. She then went on to say " you want him to walk, but nobody is in here to watch him". I then went on to say that when he wants to walk around to call us and we would assist him... she then said she did not want him to sign the form. I then gave patient back the form which he took and tore in half. Bed alarm set.  Patients mother then asked for the tore up paper which I explained would go in the paper adriana. Explained to patient again it was for his safety. Voiced understanding.   "

## 2024-02-04 NOTE — PLAN OF CARE
Problem: Physical Therapy  Goal: Physical Therapy Goal  Description: Goals to be met by: 02/15/2024     Patient will increase functional independence with mobility by performin). Supine to sit with Modified [place]  2). Sit to supine with Modified [place]  3). Sit to stand transfer with Modified [place]  4). Gait  x > 200 feet with Modified [place]     Outcome: Ongoing, Progressing

## 2024-02-04 NOTE — NURSING
Pt with elevated bp of 181/113 on dinamap and 164/110 manual. States pain is 7/10.     Abdomen is distended and bowel sounds are hypoactive. Pt and his mother state he is passing a lot of flatus and drinking a lot of fluids. Pt was questioned about what he had eaten today as there was some questioning about whether he had eaten a hamburger and stated that he only ate what was brought to him by hospital staff. Pt did admit to eating a hamburger yesterday that his mother brought him but pt stated that he told Dr Pascual and that Dr Pascual stated it was okay. Pt was encouraged to follow the  orders in place for now for the full liquid diet so as to avoid further abdominal pain and/or complications.  Pt was also encouraged by this nurse as well as charge nurse Monica to ambulate in [place]opez to help bowels move better.   Pt and mother adamantly refused for patient to ambulate in [place]opez and stated that he has been walking around in the the room as well as sitting up in the chair a lot.  Pt was also encouraged to use urinal and to call for staff to come empty so that intake and outputs could be measured and recorded. Pt and mother both verbalize understanding.      2014 KAREN Crow NP notified by charge nurse Monica and that his pain meds as well as other hs meds were due in about 30 min. NP stated to go ahead and give ordered meds and then see how blood pressure responds.     2140 BP now 164/93 after meds

## 2024-02-04 NOTE — SUBJECTIVE & OBJECTIVE
Interval History:  Patient seen and examined.   No acute events overnight.  Nursing advises that patient has had a bowel movement today and that he ate part of his mother's hamburger yesterday, despite being on full liquid diet.  Today upon exam, he is hiccuping.  He still feels distended and continues to have abdominal pain. He states he has been moving around in his room but has not been ambulating in the halls.   Will consult PT for mobilization.    Review of Systems   Gastrointestinal:  Positive for abdominal distention and abdominal pain.   All other systems reviewed and are negative.    Objective:     Vital Signs (Most Recent):  Temp: 98 °F (36.7 °C) (02/04/24 0732)  Pulse: 107 (02/04/24 0732)  Resp: 16 (02/04/24 1044)  BP: (!) 148/84 (02/04/24 0732)  SpO2: 96 % (02/04/24 0732) Vital Signs (24h Range):  Temp:  [98 °F (36.7 °C)-98.5 °F (36.9 °C)] 98 °F (36.7 °C)  Pulse:  [107-123] 107  Resp:  [16-20] 16  SpO2:  [94 %-98 %] 96 %  BP: (138-181)/() 148/84     Weight: 111.6 kg (246 lb)  Body mass index is 35.3 kg/m².    Intake/Output Summary (Last 24 hours) at 2/4/2024 1108  Last data filed at 2/4/2024 0647  Gross per 24 hour   Intake 2770.84 ml   Output 1300 ml   Net 1470.84 ml           Physical Exam  Constitutional:       General: He is not in acute distress.     Appearance: He is not ill-appearing, toxic-appearing or diaphoretic.   Cardiovascular:      Rate and Rhythm: Normal rate and regular rhythm.      Pulses: Normal pulses.      Heart sounds: Normal heart sounds.   Pulmonary:      Effort: Pulmonary effort is normal. No respiratory distress.      Breath sounds: Normal breath sounds.   Abdominal:      General: There is distension (mild).      Tenderness: There is abdominal tenderness (appropriately tender). There is no guarding.      Comments: Hypoactive bowel sounds.  Abdominal dressing clean dry and intact.   Skin:     General: Skin is warm and dry.      Coloration: Skin is not pale.   Neurological:       Mental Status: He is alert and oriented to person, place, and time. Mental status is at baseline.             Significant Labs: All pertinent labs within the past 24 hours have been reviewed.  CBC:   Recent Labs   Lab 02/03/24 0435 02/04/24 0419   WBC 9.99 9.51   HGB 10.8* 10.9*   HCT 33.2* 33.6*    277       CMP:   Recent Labs   Lab 02/03/24 0435 02/04/24 0419    137   K 3.6 3.9    101   CO2 27 28   * 116*   BUN 4* 3*   CREATININE 0.7 0.7   CALCIUM 8.4* 8.5*   ANIONGAP 9 8         Significant Imaging: I have reviewed all pertinent imaging results/findings within the past 24 hours.

## 2024-02-04 NOTE — PROGRESS NOTES
[facility] Medicine  Progress Note    Patient Name: Brayedn Francois Jr.  MRN: [id number]  Patient Class: IP- Inpatient   Admission Date: 1/31/2024  Length of Stay: 4 days  Attending Physician: Roya Mackenzie MD  Primary Care Provider: Ibis Rolle NP        Subjective:     Principal Problem:Colostomy in place        HPI:  Brayden Francois is a 34-year-old male who was seen in PACU.  He is postop day 0 colostomy reversal.  He has a patient Dr. Pascual's.  Patient had colostomy secondary to perforated diverticulum.  Previous medical history includes colostomy status, substance abuse history, psychiatric history.  Preop labs and imaging were reviewed.  Patient be admitted to Hospital Medicine for treatment and management.  Will follow Dr. Pascual's recommendations.    Overview/Hospital Course:  No notes on file    Interval History:  Patient seen and examined.   No acute events overnight.  Nursing advises that patient has had a bowel movement today and that he ate part of his mother's hamburger yesterday, despite being on full liquid diet.  Today upon exam, he is hiccuping.  He still feels distended and continues to have abdominal pain. He states he has been moving around in his room but has not been ambulating in the halls.   Will consult PT for mobilization.    Review of Systems   Gastrointestinal:  Positive for abdominal distention and abdominal pain.   All other systems reviewed and are negative.    Objective:     Vital Signs (Most Recent):  Temp: 98 °F (36.7 °C) (02/04/24 0732)  Pulse: 107 (02/04/24 0732)  Resp: 16 (02/04/24 1044)  BP: (!) 148/84 (02/04/24 0732)  SpO2: 96 % (02/04/24 0732) Vital Signs (24h Range):  Temp:  [98 °F (36.7 °C)-98.5 °F (36.9 °C)] 98 °F (36.7 °C)  Pulse:  [107-123] 107  Resp:  [16-20] 16  SpO2:  [94 %-98 %] 96 %  BP: (138-181)/() 148/84     Weight: 111.6 kg (246 lb)  Body mass index is 35.3 kg/m².    Intake/Output Summary (Last 24 hours) at  2/4/2024 1108  Last data filed at 2/4/2024 0647  Gross per 24 hour   Intake 2770.84 ml   Output 1300 ml   Net 1470.84 ml           Physical Exam  Constitutional:       General: He is not in acute distress.     Appearance: He is not ill-appearing, toxic-appearing or diaphoretic.   Cardiovascular:      Rate and Rhythm: Normal rate and regular rhythm.      Pulses: Normal pulses.      Heart sounds: Normal heart sounds.   Pulmonary:      Effort: Pulmonary effort is normal. No respiratory distress.      Breath sounds: Normal breath sounds.   Abdominal:      General: There is distension (mild).      Tenderness: There is abdominal tenderness (appropriately tender). There is no guarding.      Comments: Hypoactive bowel sounds.  Abdominal dressing clean dry and intact.   Skin:     General: Skin is warm and dry.      Coloration: Skin is not pale.   Neurological:      Mental Status: He is alert and oriented to person, place, and time. Mental status is at baseline.             Significant Labs: All pertinent labs within the past 24 hours have been reviewed.  CBC:   Recent Labs   Lab 02/03/24  0435 02/04/24  0419   WBC 9.99 9.51   HGB 10.8* 10.9*   HCT 33.2* 33.6*    277       CMP:   Recent Labs   Lab 02/03/24  0435 02/04/24  0419    137   K 3.6 3.9    101   CO2 27 28   * 116*   BUN 4* 3*   CREATININE 0.7 0.7   CALCIUM 8.4* 8.5*   ANIONGAP 9 8         Significant Imaging: I have reviewed all pertinent imaging results/findings within the past 24 hours.    Assessment/Plan:      * Colostomy in place  Acute problem   Status post colostomy reversal  Postop day 3  Follow general surgery recommendations   Monitor H/H closely   Aggressive Incentive spirometry  Pain control with p.r.n. narcotics   Antiemetics p.r.n.   Encourage early ambulation   Diet per General surgery recommendations  SCDs DVT prophylaxis per General surgery recommendations   --> 116    Psychosis  Chronic problem  Continue psychiatric  medications   Monitor closely        VTE Risk Mitigation (From admission, onward)           Ordered     Place XUAN hose  Until discontinued         02/01/24 1147                    Discharge Planning   YOLANDA: 2/3/2024     Code Status: Full Code   Is the patient medically ready for discharge?:     Reason for patient still in hospital (select all that apply): Patient trending condition and Treatment  Discharge Plan A: Home with family                  Jackie Reynaga NP  Department of Hospital Medicine   [facility]/Surg

## 2024-02-05 LAB
ANION GAP SERPL CALC-SCNC: 10 MMOL/L (ref 8–16)
BUN SERPL-MCNC: 6 MG/DL (ref 6–20)
CALCIUM SERPL-MCNC: 8.7 MG/DL (ref 8.7–10.5)
CHLORIDE SERPL-SCNC: 96 MMOL/L (ref 95–110)
CO2 SERPL-SCNC: 29 MMOL/L (ref 23–29)
CREAT SERPL-MCNC: 0.7 MG/DL (ref 0.5–1.4)
CRP SERPL-MCNC: 101.9 MG/L (ref 0–8.2)
ERYTHROCYTE [DISTWIDTH] IN BLOOD BY AUTOMATED COUNT: 13.8 % (ref 11.5–14.5)
EST. GFR  (NO RACE VARIABLE): >60 ML/MIN/1.73 M^2
GLUCOSE SERPL-MCNC: 121 MG/DL (ref 70–110)
HCT VFR BLD AUTO: 35.3 % (ref 40–54)
HGB BLD-MCNC: 11.6 G/DL (ref 14–18)
MCH RBC QN AUTO: 27.2 PG (ref 27–31)
MCHC RBC AUTO-ENTMCNC: 32.9 G/DL (ref 32–36)
MCV RBC AUTO: 83 FL (ref 82–98)
PLATELET # BLD AUTO: 351 K/UL (ref 150–450)
PMV BLD AUTO: 8.2 FL (ref 9.2–12.9)
POTASSIUM SERPL-SCNC: 4.1 MMOL/L (ref 3.5–5.1)
RBC # BLD AUTO: 4.26 M/UL (ref 4.6–6.2)
SODIUM SERPL-SCNC: 135 MMOL/L (ref 136–145)
WBC # BLD AUTO: 12.9 K/UL (ref 3.9–12.7)

## 2024-02-05 PROCEDURE — 25000003 PHARM REV CODE 250: Performed by: NURSE PRACTITIONER

## 2024-02-05 PROCEDURE — 86140 C-REACTIVE PROTEIN: CPT | Performed by: STUDENT IN AN ORGANIZED HEALTH CARE EDUCATION/TRAINING PROGRAM

## 2024-02-05 PROCEDURE — 63600175 PHARM REV CODE 636 W HCPCS: Performed by: STUDENT IN AN ORGANIZED HEALTH CARE EDUCATION/TRAINING PROGRAM

## 2024-02-05 PROCEDURE — 99900035 HC TECH TIME PER 15 MIN (STAT)

## 2024-02-05 PROCEDURE — 94761 N-INVAS EAR/PLS OXIMETRY MLT: CPT

## 2024-02-05 PROCEDURE — 85027 COMPLETE CBC AUTOMATED: CPT | Performed by: STUDENT IN AN ORGANIZED HEALTH CARE EDUCATION/TRAINING PROGRAM

## 2024-02-05 PROCEDURE — 11000001 HC ACUTE MED/SURG PRIVATE ROOM

## 2024-02-05 PROCEDURE — 25000003 PHARM REV CODE 250: Performed by: STUDENT IN AN ORGANIZED HEALTH CARE EDUCATION/TRAINING PROGRAM

## 2024-02-05 PROCEDURE — 94799 UNLISTED PULMONARY SVC/PX: CPT | Mod: XB

## 2024-02-05 PROCEDURE — 80048 BASIC METABOLIC PNL TOTAL CA: CPT | Performed by: STUDENT IN AN ORGANIZED HEALTH CARE EDUCATION/TRAINING PROGRAM

## 2024-02-05 PROCEDURE — 36415 COLL VENOUS BLD VENIPUNCTURE: CPT | Performed by: STUDENT IN AN ORGANIZED HEALTH CARE EDUCATION/TRAINING PROGRAM

## 2024-02-05 PROCEDURE — 25500020 PHARM REV CODE 255

## 2024-02-05 PROCEDURE — 63600175 PHARM REV CODE 636 W HCPCS

## 2024-02-05 PROCEDURE — 25000003 PHARM REV CODE 250

## 2024-02-05 PROCEDURE — S4991 NICOTINE PATCH NONLEGEND: HCPCS | Performed by: NURSE PRACTITIONER

## 2024-02-05 PROCEDURE — A9698 NON-RAD CONTRAST MATERIALNOC: HCPCS

## 2024-02-05 RX ORDER — SODIUM CHLORIDE, SODIUM LACTATE, POTASSIUM CHLORIDE, CALCIUM CHLORIDE 600; 310; 30; 20 MG/100ML; MG/100ML; MG/100ML; MG/100ML
INJECTION, SOLUTION INTRAVENOUS CONTINUOUS
Status: DISCONTINUED | OUTPATIENT
Start: 2024-02-05 | End: 2024-02-08 | Stop reason: HOSPADM

## 2024-02-05 RX ADMIN — OXYCODONE HYDROCHLORIDE 20 MG: 5 TABLET ORAL at 04:02

## 2024-02-05 RX ADMIN — Medication 1 PATCH: at 08:02

## 2024-02-05 RX ADMIN — MUPIROCIN: 20 OINTMENT TOPICAL at 08:02

## 2024-02-05 RX ADMIN — DIVALPROEX SODIUM 500 MG: 250 TABLET, DELAYED RELEASE ORAL at 04:02

## 2024-02-05 RX ADMIN — IOHEXOL 1000 ML: 12 SOLUTION ORAL at 10:02

## 2024-02-05 RX ADMIN — DULOXETINE HYDROCHLORIDE 60 MG: 30 CAPSULE, DELAYED RELEASE ORAL at 08:02

## 2024-02-05 RX ADMIN — DIVALPROEX SODIUM 500 MG: 250 TABLET, DELAYED RELEASE ORAL at 08:02

## 2024-02-05 RX ADMIN — MIRTAZAPINE 15 MG: 15 TABLET, FILM COATED ORAL at 08:02

## 2024-02-05 RX ADMIN — SODIUM CHLORIDE, POTASSIUM CHLORIDE, SODIUM LACTATE AND CALCIUM CHLORIDE: 600; 310; 30; 20 INJECTION, SOLUTION INTRAVENOUS at 08:02

## 2024-02-05 RX ADMIN — PANTOPRAZOLE SODIUM 40 MG: 40 TABLET, DELAYED RELEASE ORAL at 08:02

## 2024-02-05 RX ADMIN — PROMETHAZINE HYDROCHLORIDE 6.25 MG: 25 INJECTION INTRAMUSCULAR; INTRAVENOUS at 02:02

## 2024-02-05 RX ADMIN — IOHEXOL 100 ML: 350 INJECTION, SOLUTION INTRAVENOUS at 10:02

## 2024-02-05 RX ADMIN — RISPERIDONE 2 MG: 1 TABLET ORAL at 08:02

## 2024-02-05 RX ADMIN — AMLODIPINE BESYLATE 10 MG: 5 TABLET ORAL at 08:02

## 2024-02-05 RX ADMIN — OXYCODONE HYDROCHLORIDE 20 MG: 5 TABLET ORAL at 08:02

## 2024-02-05 RX ADMIN — TRAZODONE HYDROCHLORIDE 50 MG: 50 TABLET ORAL at 08:02

## 2024-02-05 RX ADMIN — OXYCODONE HYDROCHLORIDE 20 MG: 5 TABLET ORAL at 12:02

## 2024-02-05 NOTE — PLAN OF CARE
Problem: Adult Inpatient Plan of Care  Goal: Plan of Care Review  Outcome: Ongoing, Progressing  Goal: Patient-Specific Goal (Individualized)  Outcome: Ongoing, Progressing  Goal: Absence of Hospital-Acquired Illness or Injury  Outcome: Ongoing, Progressing  Goal: Optimal Comfort and Wellbeing  Outcome: Ongoing, Progressing  Goal: Readiness for Transition of Care  Outcome: Ongoing, Progressing     Problem: Infection  Goal: Absence of Infection Signs and Symptoms  Outcome: Ongoing, Progressing     Problem: Fall Injury Risk  Goal: Absence of Fall and Fall-Related Injury  Outcome: Ongoing, Progressing     Problem: Bleeding (Bowel Resection)  Goal: Absence of Bleeding  Outcome: Ongoing, Progressing     Problem: Bowel Motility Impaired (Bowel Resection)  Goal: Effective Bowel Motility and Elimination  Outcome: Ongoing, Progressing     Problem: Fluid and Electrolyte Imbalance (Bowel Resection)  Goal: Fluid and Electrolyte Balance  Outcome: Ongoing, Progressing     Problem: Infection (Bowel Resection)  Goal: Absence of Infection Signs and Symptoms  Outcome: Ongoing, Progressing     Problem: Pain (Bowel Resection)  Goal: Acceptable Pain Control  Outcome: Ongoing, Progressing

## 2024-02-05 NOTE — PT/OT/SLP PROGRESS
Physical Therapy      Patient Name:  Brayden Francois Jr.   MRN:  [id number]    Patient not seen today secondary to mother refused in a.m. patient refused in p.m.  RN (Kris) notified.  Will follow-up 02/06.

## 2024-02-05 NOTE — ASSESSMENT & PLAN NOTE
Acute problem   Status post colostomy reversal  Follow general surgery recommendations   Monitor H/H closely   Aggressive Incentive spirometry  Pain control with p.r.n. narcotics   Antiemetics p.r.n.   Encourage early ambulation   Diet per General surgery recommendations  SCDs DVT prophylaxis per General surgery recommendations   --> 116 --> 101  CT abdomen pelvis pending

## 2024-02-05 NOTE — NURSING
Upon rounding on patient, patient was found standing hovering over trash can with emesis bag vomiting. Pt vomited on bed and floor as well as emesis bag. Amount measured in emesis bag was 600 ml. Emesis was dark brown sour smelling liquid. Leticia SHARP, DNP made aware of issues as of now as well as the nausea and vomiting he had earlier in the shift and the results of the KUB, and that zofran is not due at this time.  NP also made aware that patient has been noncompliant with diet despite having been educated. Order rec'd for phenergan iv.

## 2024-02-05 NOTE — SIGNIFICANT EVENT
Hospital Medicine Significant Event Note      Admit Date: 1/31/2024  LOS: 4  Code Status: Full Code   CC: Colostomy in place  Date of Consult: 02/04/2024  RRT Indication(s):  Abdominal distention  Attending Physician: Roya Mackenzie MD  Primary Service: Networked reference to record PCT     SUBJECTIVE:     Interval history:  34-year-old male familiar to me.  Admitted recently status post colostomy reversal.  Nursing called to report increasing abdominal distention and pain.      OBJECTIVE:     Vital Signs (Most Recent):  Temp: 98.2 °F (36.8 °C) (02/04/24 1951)  Pulse: (!) 115 (02/04/24 1951)  Resp: 18 (02/04/24 2259)  BP: (!) 162/104 (02/04/24 1951)  SpO2: (!) 93 % (02/04/24 1951) Vital Signs (24h Range):  Temp:  [98 °F (36.7 °C)-98.5 °F (36.9 °C)] 98.2 °F (36.8 °C)  Pulse:  [107-119] 115  Resp:  [16-20] 18  SpO2:  [93 %-96 %] 93 %  BP: (138-162)/() 162/104     I & O (24h):    Intake/Output Summary (Last 24 hours) at 2/4/2024 2313  Last data filed at 2/4/2024 2100  Gross per 24 hour   Intake 2270.84 ml   Output 400 ml   Net 1870.84 ml        Physical Exam: Physical Exam    Lines/Drains/Airway:          Colostomy 09/20/23 2336 Descending/sigmoid LLQ (Active)        Nutrition:  Current Diet Order   Procedures    Diet full liquid         Labs/Imaging- All pertinent labs within the past 24 hours have been reviewed.    Diagnostics/Interventions during Rapid response     Abdominal x-ray:   FINDINGS: Gastrointestinal tract: Several dilated loops of small bowel and colon without discrete transition. Intraperitoneal space: Possible supine radiographic finding of free air in the right lower quadrant however this is nonspecific. Bones/joints: No acute osseous finding. Soft tissues: Ventral, midline skin staples. IMPRESSION: 1. Several dilated loops of small bowel and colon without discrete transition. Correlate clinically for ileus versus obstruction. 2. Possible supine radiographic finding of free air in the right  lower quadrant however this is nonspecific. If patient does not have reason for free air or if this is an unexpected finding, consider upright abdominal radiograph for further evaluation.    ASSESSMENT/PLAN:   Awake alert oriented person place time situation  Skin warm and dry and pink:   Lungs equal and clear bilateral   Abdomen mildly distended.  Bowel sounds present.  Surgical incisions intact.  Active Hospital Problems    Diagnosis    *Colostomy in place    Psychosis          Patient endorses abdominal distention close to his baseline.  He denies any abdominal pains time my exam.  He does not appear to be toxic or acutely ill in appearance.  I spoke with Dr. Pascual and relayed the x-ray and physical exam findings.  Will continue to monitor.      Uninterrupted Critical Care time (not including procedures): 30

## 2024-02-05 NOTE — PLAN OF CARE
Problem: Adult Inpatient Plan of Care  Goal: Plan of Care Review  Outcome: Ongoing, Progressing     Problem: Infection  Goal: Absence of Infection Signs and Symptoms  Outcome: Ongoing, Progressing     Problem: Fall Injury Risk  Goal: Absence of Fall and Fall-Related Injury  Outcome: Ongoing, Progressing     Problem: Bleeding (Bowel Resection)  Goal: Absence of Bleeding  Outcome: Ongoing, Progressing     Problem: Bowel Motility Impaired (Bowel Resection)  Goal: Effective Bowel Motility and Elimination  Outcome: Ongoing, Progressing     Problem: Fluid and Electrolyte Imbalance (Bowel Resection)  Goal: Fluid and Electrolyte Balance  Outcome: Ongoing, Progressing     Problem: Infection (Bowel Resection)  Goal: Absence of Infection Signs and Symptoms  Outcome: Ongoing, Progressing     Problem: Pain Acute  Goal: Acceptable Pain Control and Functional Ability  Outcome: Ongoing, Progressing

## 2024-02-05 NOTE — HOSPITAL COURSE
34 y.o. M patient was admitted S/P colostomy reversal 1/31/23.  Postoperatively his pain was difficult to control in his pain regimen was adjusted.  He was initiated on clear liquid diet and advanced to full liquids but had been noted to be eating more than liquids by staff.  He developed an ileus with abdominal distention and nausea.  CT abdomen and pelvis was obtained that revealed rim enhancing areas concerning for abscess vs blood - Dr. Pascual (surgery) suspected hematomas. He remained afebrile with no leukocytosis. Dropped back to clear liquids after unsuccessful advancing of diet. Lytes normal - Bowel sounds positive - less abdominal distension - surgery advanced diet again to full liquids and subsequently to regular diet which he has been able to tolerate without emesis or nausea.  Bowel sounds positive all 4 quads, he is in good spirits, and can safely discharge to home.  Afebrile without leukocytosis, and vitals normal.  Follow up with surgery in clinic

## 2024-02-05 NOTE — PLAN OF CARE
2/28/24 9:30 Hospital follow up with Augie Torres added to pts AVS.    02/05/24 1143   Discharge Assessment   Assessment Type Discharge Planning Reassessment

## 2024-02-05 NOTE — PROGRESS NOTES
[facility] Medicine  Progress Note    Patient Name: Brayden Francois Jr.  MRN: [id number]  Patient Class: IP- Inpatient   Admission Date: 1/31/2024  Length of Stay: 5 days  Attending Physician: Roya Mackenzie MD  Primary Care Provider: Orion Sabillon PA-C        Subjective:     Principal Problem:Colostomy in place        HPI:  Brayden Francois is a 34-year-old male who was seen in PACU.  He is postop day 0 colostomy reversal.  He has a patient Dr. Pascual's.  Patient had colostomy secondary to perforated diverticulum.  Previous medical history includes colostomy status, substance abuse history, psychiatric history.  Preop labs and imaging were reviewed.  Patient be admitted to Hospital Medicine for treatment and management.  Will follow Dr. Pascual's recommendations.    Overview/Hospital Course:  No notes on file    Interval History:  Patient seen and examined.   Acutely worsening abdominal pain and distention last night after eating a cookie.  He is currently NPO and CT of abdomen pelvis is pending.    Review of Systems   Gastrointestinal:  Positive for abdominal distention and abdominal pain.   All other systems reviewed and are negative.    Objective:     Vital Signs (Most Recent):  Temp: 98.3 °F (36.8 °C) (02/05/24 0751)  Pulse: 104 (02/05/24 0751)  Resp: 20 (02/05/24 0808)  BP: 123/83 (02/05/24 0751)  SpO2: (!) 91 % (02/05/24 0751) Vital Signs (24h Range):  Temp:  [98 °F (36.7 °C)-98.9 °F (37.2 °C)] 98.3 °F (36.8 °C)  Pulse:  [104-120] 104  Resp:  [16-20] 20  SpO2:  [91 %-96 %] 91 %  BP: (123-162)/() 123/83     Weight: 111.6 kg (246 lb)  Body mass index is 35.3 kg/m².    Intake/Output Summary (Last 24 hours) at 2/5/2024 1120  Last data filed at 2/5/2024 0622  Gross per 24 hour   Intake 787.58 ml   Output 1000 ml   Net -212.42 ml           Physical Exam  Constitutional:       General: He is not in acute distress.     Appearance: He is not ill-appearing, toxic-appearing or  diaphoretic.   Cardiovascular:      Rate and Rhythm: Normal rate and regular rhythm.      Pulses: Normal pulses.      Heart sounds: Normal heart sounds.   Pulmonary:      Effort: Pulmonary effort is normal. No respiratory distress.      Breath sounds: Normal breath sounds.   Abdominal:      General: There is distension.      Tenderness: There is abdominal tenderness (appropriately tender). There is no guarding.      Comments: Hypoactive bowel sounds.  Abdominal dressing clean dry and intact.   Skin:     General: Skin is warm and dry.      Coloration: Skin is not pale.   Neurological:      Mental Status: He is alert and oriented to person, place, and time. Mental status is at baseline.             Significant Labs: All pertinent labs within the past 24 hours have been reviewed.  CBC:   Recent Labs   Lab 02/04/24 0419 02/05/24  0430   WBC 9.51 12.90*   HGB 10.9* 11.6*   HCT 33.6* 35.3*    351       CMP:   Recent Labs   Lab 02/04/24 0419 02/05/24  0430    135*   K 3.9 4.1    96   CO2 28 29   * 121*   BUN 3* 6   CREATININE 0.7 0.7   CALCIUM 8.5* 8.7   ANIONGAP 8 10         Significant Imaging: I have reviewed all pertinent imaging results/findings within the past 24 hours.    CT abdomen pelvis pending    Assessment/Plan:      * Colostomy in place  Acute problem   Status post colostomy reversal  Follow general surgery recommendations   Monitor H/H closely   Aggressive Incentive spirometry  Pain control with p.r.n. narcotics   Antiemetics p.r.n.   Encourage early ambulation   Diet per General surgery recommendations  SCDs DVT prophylaxis per General surgery recommendations   --> 116 --> 101  CT abdomen pelvis pending    Psychosis  Chronic problem  Continue psychiatric medications   Monitor closely        VTE Risk Mitigation (From admission, onward)           Ordered     Place XUAN hose  Until discontinued         02/01/24 1147                    Discharge Planning   YOLANDA: 2/6/2024     Code  Status: Full Code   Is the patient medically ready for discharge?:     Reason for patient still in hospital (select all that apply): Patient trending condition, Treatment, and Imaging  Discharge Plan A: Home with family                  Jackie Reynaga NP  Department of Hospital Medicine   [facility]/Surg

## 2024-02-05 NOTE — SUBJECTIVE & OBJECTIVE
Interval History:  Patient seen and examined.   Acutely worsening abdominal pain and distention last night after eating a cookie.  He is currently NPO and CT of abdomen pelvis is pending.    Review of Systems   Gastrointestinal:  Positive for abdominal distention and abdominal pain.   All other systems reviewed and are negative.    Objective:     Vital Signs (Most Recent):  Temp: 98.3 °F (36.8 °C) (02/05/24 0751)  Pulse: 104 (02/05/24 0751)  Resp: 20 (02/05/24 0808)  BP: 123/83 (02/05/24 0751)  SpO2: (!) 91 % (02/05/24 0751) Vital Signs (24h Range):  Temp:  [98 °F (36.7 °C)-98.9 °F (37.2 °C)] 98.3 °F (36.8 °C)  Pulse:  [104-120] 104  Resp:  [16-20] 20  SpO2:  [91 %-96 %] 91 %  BP: (123-162)/() 123/83     Weight: 111.6 kg (246 lb)  Body mass index is 35.3 kg/m².    Intake/Output Summary (Last 24 hours) at 2/5/2024 1120  Last data filed at 2/5/2024 0622  Gross per 24 hour   Intake 787.58 ml   Output 1000 ml   Net -212.42 ml           Physical Exam  Constitutional:       General: He is not in acute distress.     Appearance: He is not ill-appearing, toxic-appearing or diaphoretic.   Cardiovascular:      Rate and Rhythm: Normal rate and regular rhythm.      Pulses: Normal pulses.      Heart sounds: Normal heart sounds.   Pulmonary:      Effort: Pulmonary effort is normal. No respiratory distress.      Breath sounds: Normal breath sounds.   Abdominal:      General: There is distension.      Tenderness: There is abdominal tenderness (appropriately tender). There is no guarding.      Comments: Hypoactive bowel sounds.  Abdominal dressing clean dry and intact.   Skin:     General: Skin is warm and dry.      Coloration: Skin is not pale.   Neurological:      Mental Status: He is alert and oriented to person, place, and time. Mental status is at baseline.             Significant Labs: All pertinent labs within the past 24 hours have been reviewed.  CBC:   Recent Labs   Lab 02/04/24  0419 02/05/24  0430   WBC 9.51 12.90*    HGB 10.9* 11.6*   HCT 33.6* 35.3*    351       CMP:   Recent Labs   Lab 02/04/24  0419 02/05/24  0430    135*   K 3.9 4.1    96   CO2 28 29   * 121*   BUN 3* 6   CREATININE 0.7 0.7   CALCIUM 8.5* 8.7   ANIONGAP 8 10         Significant Imaging: I have reviewed all pertinent imaging results/findings within the past 24 hours.    CT abdomen pelvis pending

## 2024-02-05 NOTE — PROGRESS NOTES
Patient seen and examined.  Having more abdominal pain, nausea, and emesis overnight.  After vomiting, he feels much improved.  His abdomen is much softer.  Less pain.      Persistent low-grade tachycardia  Afebrile  Abdomen is less distended, less tender, and much softer today    Labs reviewed, mild leukocytosis  CRP continues to downtrend     KUB overnight was reviewed.  Gas-filled loops of small and large bowel.  Question [place] read of possible free air.      Overall, patient seems to be improved since nausea and vomiting overnight.  Patient likely has an ileus based off of KUB findings and clinical findings.  Given questionable free air seen on KUB, CT scan was ordered but clinically seems to be improving.

## 2024-02-05 NOTE — CARE UPDATE
02/04/24 1923   Patient Assessment/Suction   Level of Consciousness (AVPU) alert   Respiratory Effort Unlabored   Expansion/Accessory Muscles/Retractions no use of accessory muscles;no retractions   PRE-TX-O2   Device (Oxygen Therapy) room air   SpO2 96 %   Pulse Oximetry Type Intermittent   $ Pulse Oximetry - Multiple Charge Pulse Oximetry - Multiple   Pulse (!) 116   Resp 18   Incentive Spirometer   $ Incentive Spirometer Charges refused  (pt in pain and nauseous)

## 2024-02-05 NOTE — NURSING
KAREN Crow NP notified of patient having n/v, abdominal distention and the fact that patient has not been compliant with diet despite education.  Order rec'd for KUB at this time.

## 2024-02-06 LAB
ANION GAP SERPL CALC-SCNC: 8 MMOL/L (ref 8–16)
BUN SERPL-MCNC: 5 MG/DL (ref 6–20)
CALCIUM SERPL-MCNC: 8.4 MG/DL (ref 8.7–10.5)
CHLORIDE SERPL-SCNC: 98 MMOL/L (ref 95–110)
CO2 SERPL-SCNC: 32 MMOL/L (ref 23–29)
CREAT SERPL-MCNC: 0.7 MG/DL (ref 0.5–1.4)
CRP SERPL-MCNC: 82.9 MG/L (ref 0–8.2)
ERYTHROCYTE [DISTWIDTH] IN BLOOD BY AUTOMATED COUNT: 13.9 % (ref 11.5–14.5)
EST. GFR  (NO RACE VARIABLE): >60 ML/MIN/1.73 M^2
GLUCOSE SERPL-MCNC: 100 MG/DL (ref 70–110)
HCT VFR BLD AUTO: 32.6 % (ref 40–54)
HGB BLD-MCNC: 10.4 G/DL (ref 14–18)
MCH RBC QN AUTO: 27.1 PG (ref 27–31)
MCHC RBC AUTO-ENTMCNC: 31.9 G/DL (ref 32–36)
MCV RBC AUTO: 85 FL (ref 82–98)
PLATELET # BLD AUTO: 316 K/UL (ref 150–450)
PMV BLD AUTO: 8.1 FL (ref 9.2–12.9)
POTASSIUM SERPL-SCNC: 4.3 MMOL/L (ref 3.5–5.1)
RBC # BLD AUTO: 3.84 M/UL (ref 4.6–6.2)
SODIUM SERPL-SCNC: 138 MMOL/L (ref 136–145)
WBC # BLD AUTO: 8.06 K/UL (ref 3.9–12.7)

## 2024-02-06 PROCEDURE — 25000003 PHARM REV CODE 250: Performed by: NURSE PRACTITIONER

## 2024-02-06 PROCEDURE — 97116 GAIT TRAINING THERAPY: CPT | Mod: CQ

## 2024-02-06 PROCEDURE — S4991 NICOTINE PATCH NONLEGEND: HCPCS | Performed by: NURSE PRACTITIONER

## 2024-02-06 PROCEDURE — 85027 COMPLETE CBC AUTOMATED: CPT | Performed by: STUDENT IN AN ORGANIZED HEALTH CARE EDUCATION/TRAINING PROGRAM

## 2024-02-06 PROCEDURE — 86140 C-REACTIVE PROTEIN: CPT | Performed by: STUDENT IN AN ORGANIZED HEALTH CARE EDUCATION/TRAINING PROGRAM

## 2024-02-06 PROCEDURE — 11000001 HC ACUTE MED/SURG PRIVATE ROOM

## 2024-02-06 PROCEDURE — 25000003 PHARM REV CODE 250: Performed by: STUDENT IN AN ORGANIZED HEALTH CARE EDUCATION/TRAINING PROGRAM

## 2024-02-06 PROCEDURE — 94799 UNLISTED PULMONARY SVC/PX: CPT | Mod: XB

## 2024-02-06 PROCEDURE — 63600175 PHARM REV CODE 636 W HCPCS: Performed by: STUDENT IN AN ORGANIZED HEALTH CARE EDUCATION/TRAINING PROGRAM

## 2024-02-06 PROCEDURE — 27000221 HC OXYGEN, UP TO 24 HOURS

## 2024-02-06 PROCEDURE — 25000003 PHARM REV CODE 250

## 2024-02-06 PROCEDURE — 80048 BASIC METABOLIC PNL TOTAL CA: CPT | Performed by: STUDENT IN AN ORGANIZED HEALTH CARE EDUCATION/TRAINING PROGRAM

## 2024-02-06 PROCEDURE — 36415 COLL VENOUS BLD VENIPUNCTURE: CPT | Performed by: STUDENT IN AN ORGANIZED HEALTH CARE EDUCATION/TRAINING PROGRAM

## 2024-02-06 PROCEDURE — 94761 N-INVAS EAR/PLS OXIMETRY MLT: CPT

## 2024-02-06 RX ORDER — CHLORPROMAZINE HYDROCHLORIDE 25 MG/1
25 TABLET, FILM COATED ORAL ONCE
Status: COMPLETED | OUTPATIENT
Start: 2024-02-06 | End: 2024-02-06

## 2024-02-06 RX ADMIN — SODIUM CHLORIDE, POTASSIUM CHLORIDE, SODIUM LACTATE AND CALCIUM CHLORIDE: 600; 310; 30; 20 INJECTION, SOLUTION INTRAVENOUS at 05:02

## 2024-02-06 RX ADMIN — TRAZODONE HYDROCHLORIDE 50 MG: 50 TABLET ORAL at 09:02

## 2024-02-06 RX ADMIN — OXYCODONE HYDROCHLORIDE 20 MG: 5 TABLET ORAL at 09:02

## 2024-02-06 RX ADMIN — DIVALPROEX SODIUM 500 MG: 250 TABLET, DELAYED RELEASE ORAL at 09:02

## 2024-02-06 RX ADMIN — AMLODIPINE BESYLATE 10 MG: 5 TABLET ORAL at 09:02

## 2024-02-06 RX ADMIN — DULOXETINE HYDROCHLORIDE 60 MG: 30 CAPSULE, DELAYED RELEASE ORAL at 09:02

## 2024-02-06 RX ADMIN — CHLORPROMAZINE HYDROCHLORIDE 25 MG: 25 TABLET, SUGAR COATED ORAL at 10:02

## 2024-02-06 RX ADMIN — DIVALPROEX SODIUM 500 MG: 250 TABLET, DELAYED RELEASE ORAL at 04:02

## 2024-02-06 RX ADMIN — OXYCODONE HYDROCHLORIDE 20 MG: 5 TABLET ORAL at 05:02

## 2024-02-06 RX ADMIN — MIRTAZAPINE 15 MG: 15 TABLET, FILM COATED ORAL at 09:02

## 2024-02-06 RX ADMIN — OXYCODONE HYDROCHLORIDE 20 MG: 5 TABLET ORAL at 12:02

## 2024-02-06 RX ADMIN — RISPERIDONE 2 MG: 1 TABLET ORAL at 09:02

## 2024-02-06 RX ADMIN — OXYCODONE HYDROCHLORIDE 20 MG: 5 TABLET ORAL at 01:02

## 2024-02-06 RX ADMIN — PANTOPRAZOLE SODIUM 40 MG: 40 TABLET, DELAYED RELEASE ORAL at 09:02

## 2024-02-06 RX ADMIN — Medication 1 PATCH: at 09:02

## 2024-02-06 NOTE — PT/OT/SLP PROGRESS
Physical Therapy Treatment    Patient Name:  Brayden Francois Jr.   MRN:  [id number]    Recommendations:     Discharge Recommendations: No Therapy Indicated  Discharge Equipment Recommendations:  (TBD (may benefit from walker))  Barriers to discharge: None    Assessment:     Brayden Francois Jr. is a 34 y.o. male admitted with a medical diagnosis of Colostomy in place.  He presents with the following impairments/functional limitations: weakness, impaired endurance, impaired skin, gait instability, decreased lower extremity function . Seated in chair at bedside.  Agreed to ambulate. Gown applied as patient shirtless with IV L hand.  Ambulated 200' with CGA with IV in tow.     Rehab Prognosis: Good; patient would benefit from acute skilled PT services to address these deficits and reach maximum level of function.    Recent Surgery: Procedure(s) (LRB):  LAPAROTOMY, EXPLORATORY (N/A)  CLOSURE, COLOSTOMY, LAPAROSCOPIC (N/A) 6 Days Post-Op    Plan:     During this hospitalization, patient to be seen 6 x/week to address the identified rehab impairments via gait training, therapeutic activities, therapeutic exercises and progress toward the following goals:    Plan of Care Expires:  02/15/24    Subjective     Chief Complaint: occasional gas  Patient/Family Comments/goals: to return home  Pain/Comfort:  Pain Rating 1: 0/10      Objective:     Communicated with nurse Carcamo prior to session.  Patient found up in chair with peripheral IV upon PT entry to room.     General Precautions: Standard, fall  Orthopedic Precautions: N/A  Braces: N/A  Respiratory Status: Room air     Functional Mobility:  Transfers:     Sit to Stand:  stand by assistance with no AD  Gait: 200' with IV in tow.      AM-PAC 6 CLICK MOBILITY          Treatment & Education:  Ambulated with CGA with IV in tow.     Patient left up in chair with all lines intact, call button in reach, and nurse Carcamo  notified..    GOALS:   Multidisciplinary Problems       Physical  Therapy Goals          Problem: Physical Therapy    Goal Priority Disciplines Outcome Goal Variances Interventions   Physical Therapy Goal     PT, PT/OT Ongoing, Progressing     Description: Goals to be met by: 02/15/2024     Patient will increase functional independence with mobility by performin). Supine to sit with Modified [place]  2). Sit to supine with Modified [place]  3). Sit to stand transfer with Modified [place]  4). Gait  x > 200 feet with Modified [place]                          Time Tracking:     PT Received On: 24  PT Start Time: 1020     PT Stop Time: 1030  PT Total Time (min): 10 min     Billable Minutes: Gait Training 10min    Treatment Type: Treatment  PT/PTA: PTA     Number of PTA visits since last PT visit: 1     2024

## 2024-02-06 NOTE — NURSING
ABdominal dressings removed incisions cleansed with wound cleanser small amount on drainage to lower mid line incision noted staples intact incision well approximated and healing , covered with sterile 4x4 and island dressings tolerated well.

## 2024-02-06 NOTE — PROGRESS NOTES
[facility] Medicine  Progress Note    Patient Name: Brayden Francois Jr.  MRN: [id number]  Patient Class: IP- Inpatient   Admission Date: 1/31/2024  Length of Stay: 6 days  Attending Physician: Roya Mackenzie MD  Primary Care Provider: Orion Sabillon PA-C        Subjective:     Principal Problem:Colostomy in place        HPI:  Brayden Francois is a 34-year-old male who was seen in PACU.  He is postop day 0 colostomy reversal.  He has a patient Dr. Pascual's.  Patient had colostomy secondary to perforated diverticulum.  Previous medical history includes colostomy status, substance abuse history, psychiatric history.  Preop labs and imaging were reviewed.  Patient be admitted to Hospital Medicine for treatment and management.  Will follow Dr. Pascual's recommendations.    Overview/Hospital Course:  Patient was admitted S/P colostomy reversal 1/31/23.  Postoperatively his pain was difficult to control in his pain regimen was adjusted.  He was initiated on clear liquid diet and advanced to full liquids.  He developed an ileus with abdominal distention and nausea.  CT abdomen and pelvis was obtained that revealedrim enhancing areas concerning for abscess vs blood - Dr. Pascual (surgery) suspects hematomas. He has remained afebrile with no leukocytosis. Dropped back to clear liquids after unsuccessful advancing of diet. Lytes normal - Bowel sounds positive    Interval History:  Clear liquid diet - pain control     Review of Systems   Gastrointestinal:  Positive for abdominal distention and abdominal pain.   All other systems reviewed and are negative.    Objective:     Vital Signs (Most Recent):  Temp: 98.3 °F (36.8 °C) (02/06/24 1134)  Pulse: 102 (02/06/24 1134)  Resp: (!) 21 (02/06/24 1134)  BP: 139/89 (02/06/24 1134)  SpO2: (!) 91 % (02/06/24 1134) Vital Signs (24h Range):  Temp:  [96.5 °F (35.8 °C)-98.8 °F (37.1 °C)] 98.3 °F (36.8 °C)  Pulse:  [] 102  Resp:  [18-21] 21  SpO2:  [91  %-96 %] 91 %  BP: (129-147)/(78-95) 139/89     Weight: 111.6 kg (246 lb)  Body mass index is 35.3 kg/m².    Intake/Output Summary (Last 24 hours) at 2/6/2024 1307  Last data filed at 2/6/2024 0724  Gross per 24 hour   Intake 3021.07 ml   Output --   Net 3021.07 ml           Physical Exam  Constitutional:       General: He is not in acute distress.     Appearance: He is not ill-appearing, toxic-appearing or diaphoretic.   Cardiovascular:      Rate and Rhythm: Normal rate and regular rhythm.      Pulses: Normal pulses.      Heart sounds: Normal heart sounds.   Pulmonary:      Effort: Pulmonary effort is normal. No respiratory distress.      Breath sounds: Normal breath sounds.   Abdominal:      General: There is distension.      Tenderness: There is abdominal tenderness (appropriately tender). There is no guarding.      Comments: Hypoactive bowel sounds.  Abdominal dressing clean dry and intact.   Skin:     General: Skin is warm and dry.      Coloration: Skin is not pale.   Neurological:      Mental Status: He is alert and oriented to person, place, and time. Mental status is at baseline.             Significant Labs: All pertinent labs within the past 24 hours have been reviewed.  CBC:   Recent Labs   Lab 02/05/24  0430 02/06/24  0443   WBC 12.90* 8.06   HGB 11.6* 10.4*   HCT 35.3* 32.6*    316       CMP:   Recent Labs   Lab 02/05/24  0430 02/06/24  0437   * 138   K 4.1 4.3   CL 96 98   CO2 29 32*   * 100   BUN 6 5*   CREATININE 0.7 0.7   CALCIUM 8.7 8.4*   ANIONGAP 10 8         Significant Imaging: I have reviewed all pertinent imaging results/findings within the past 24 hours.    CT abdomen pelvis pending    Assessment/Plan:      * Colostomy in place  Acute problem   Status post colostomy reversal  Follow general surgery recommendations   Monitor H/H closely   Aggressive Incentive spirometry  Pain control with p.r.n. narcotics   Antiemetics p.r.n.   Encourage early ambulation   Diet per General  "surgery recommendations  CT abdomen showed "2 rim enhancing fluid collections, one along the left abdominal wall near the ostomy site and another in the right lower abdominal quadrant mesentery.  Differential includes complex fluid such as hemorrhage and abscess. "  SCDs DVT prophylaxis per General surgery recommendations   --> 116 --> 101 -->82.9    Psychosis  Chronic problem  Continue psychiatric medications   Monitor closely        VTE Risk Mitigation (From admission, onward)           Ordered     Place XUAN hose  Until discontinued         02/01/24 1147                    Discharge Planning   YOLANDA: 2/9/2024     Code Status: Full Code   Is the patient medically ready for discharge?:     Reason for patient still in hospital (select all that apply): Patient unstable, Treatment, Consult recommendations, and PT / OT recommendations  Discharge Plan A: Home with family              Marie Muniz, TRISTA, APRN, FNP-C  [facility] Medicine  [facility]  [email or web address]    "

## 2024-02-06 NOTE — SUBJECTIVE & OBJECTIVE
Interval History:  Clear liquid diet - pain control     Review of Systems   Gastrointestinal:  Positive for abdominal distention and abdominal pain.   All other systems reviewed and are negative.    Objective:     Vital Signs (Most Recent):  Temp: 98.3 °F (36.8 °C) (02/06/24 1134)  Pulse: 102 (02/06/24 1134)  Resp: (!) 21 (02/06/24 1134)  BP: 139/89 (02/06/24 1134)  SpO2: (!) 91 % (02/06/24 1134) Vital Signs (24h Range):  Temp:  [96.5 °F (35.8 °C)-98.8 °F (37.1 °C)] 98.3 °F (36.8 °C)  Pulse:  [] 102  Resp:  [18-21] 21  SpO2:  [91 %-96 %] 91 %  BP: (129-147)/(78-95) 139/89     Weight: 111.6 kg (246 lb)  Body mass index is 35.3 kg/m².    Intake/Output Summary (Last 24 hours) at 2/6/2024 1307  Last data filed at 2/6/2024 0724  Gross per 24 hour   Intake 3021.07 ml   Output --   Net 3021.07 ml           Physical Exam  Constitutional:       General: He is not in acute distress.     Appearance: He is not ill-appearing, toxic-appearing or diaphoretic.   Cardiovascular:      Rate and Rhythm: Normal rate and regular rhythm.      Pulses: Normal pulses.      Heart sounds: Normal heart sounds.   Pulmonary:      Effort: Pulmonary effort is normal. No respiratory distress.      Breath sounds: Normal breath sounds.   Abdominal:      General: There is distension.      Tenderness: There is abdominal tenderness (appropriately tender). There is no guarding.      Comments: Hypoactive bowel sounds.  Abdominal dressing clean dry and intact.   Skin:     General: Skin is warm and dry.      Coloration: Skin is not pale.   Neurological:      Mental Status: He is alert and oriented to person, place, and time. Mental status is at baseline.             Significant Labs: All pertinent labs within the past 24 hours have been reviewed.  CBC:   Recent Labs   Lab 02/05/24  0430 02/06/24  0443   WBC 12.90* 8.06   HGB 11.6* 10.4*   HCT 35.3* 32.6*    316       CMP:   Recent Labs   Lab 02/05/24  0430 02/06/24  0437   * 138   K 4.1  4.3   CL 96 98   CO2 29 32*   * 100   BUN 6 5*   CREATININE 0.7 0.7   CALCIUM 8.7 8.4*   ANIONGAP 10 8         Significant Imaging: I have reviewed all pertinent imaging results/findings within the past 24 hours.    CT abdomen pelvis pending

## 2024-02-06 NOTE — CARE UPDATE
02/05/24 1952   Patient Assessment/Suction   Level of Consciousness (AVPU) alert   Respiratory Effort Unlabored   Expansion/Accessory Muscles/Retractions no use of accessory muscles;no retractions   PRE-TX-O2   Device (Oxygen Therapy) room air   SpO2 (!) 94 %   Pulse Oximetry Type Intermittent   $ Pulse Oximetry - Multiple Charge Pulse Oximetry - Multiple   Pulse 96   Resp 19   Positioning   Body Position weight shifting;supine;neutral head position;neutral body alignment;30 degrees;position changed independently   Head of Bed (HOB) Positioning HOB at 30 degrees   Positioning/Transfer Devices pillows;in use   Incentive Spirometer   $ Incentive Spirometer Charges done with encouragement;proper technique demonstrated   Administration (IS) proper technique demonstrated   Number of Repetitions (IS) 5   Level Incentive Spirometer (mL) 3000   Patient Tolerance (IS) no adverse signs/symptoms present;good

## 2024-02-06 NOTE — PLAN OF CARE
[facility]/Surg  Discharge Reassessment    Primary Care Provider: Orion Sabillon PA-C    Expected Discharge Date: 2/9/2024    Per MDRs, patient not medically ready for discharge today.  Surgery following patient.  BM and Diet advance to clears today by surgery.  CM following for discharge planning needs.    Reassessment (most recent)       Discharge Reassessment - 02/06/24 1418          Discharge Reassessment    Assessment Type Discharge Planning Reassessment     Did the patient's condition or plan change since previous assessment? Yes     Discharge Plan discussed with: Patient     Communicated YOLANDA with patient/caregiver Yes     Discharge Plan A Home     Discharge Plan B Home with family     DME Needed Upon Discharge  none     Transition of Care Barriers None     Why the patient remains in the hospital Requires continued medical care        Post-Acute Status    Post-Acute Authorization Other     Other Status No Post-Acute Service Needs     Discharge Delays None known at this time

## 2024-02-06 NOTE — PLAN OF CARE
Problem: Physical Therapy  Goal: Physical Therapy Goal  Description: Goals to be met by: 02/15/2024     Patient will increase functional independence with mobility by performin). Supine to sit with Modified [place]  2). Sit to supine with Modified [place]  3). Sit to stand transfer with Modified [place]  4). Gait  x > 200 feet with Modified [place]     Outcome: Ongoing, Progressing   Ambulate with assistance for safety.

## 2024-02-06 NOTE — PROGRESS NOTES
Pt seen and examined.  Feeling better today.  No nausea    Less distended  AF  VSS    Ok for clears today

## 2024-02-06 NOTE — ASSESSMENT & PLAN NOTE
"Acute problem   Status post colostomy reversal  Follow general surgery recommendations   Monitor H/H closely   Aggressive Incentive spirometry  Pain control with p.r.n. narcotics   Antiemetics p.r.n.   Encourage early ambulation   Diet per General surgery recommendations  CT abdomen showed "2 rim enhancing fluid collections, one along the left abdominal wall near the ostomy site and another in the right lower abdominal quadrant mesentery.  Differential includes complex fluid such as hemorrhage and abscess. "  SCDs DVT prophylaxis per General surgery recommendations   --> 116 --> 101 -->82.9  "

## 2024-02-06 NOTE — PLAN OF CARE
Problem: Adult Inpatient Plan of Care  Goal: Plan of Care Review  Outcome: Ongoing, Progressing  Goal: Patient-Specific Goal (Individualized)  Outcome: Ongoing, Progressing  Goal: Optimal Comfort and Wellbeing  Outcome: Ongoing, Progressing     Problem: Infection  Goal: Absence of Infection Signs and Symptoms  Outcome: Ongoing, Progressing     Problem: Fall Injury Risk  Goal: Absence of Fall and Fall-Related Injury  Outcome: Ongoing, Progressing     Problem: Bleeding (Bowel Resection)  Goal: Absence of Bleeding  Outcome: Ongoing, Progressing     Problem: Bowel Motility Impaired (Bowel Resection)  Goal: Effective Bowel Motility and Elimination  Outcome: Ongoing, Progressing     Problem: Infection (Bowel Resection)  Goal: Absence of Infection Signs and Symptoms  Outcome: Ongoing, Progressing     Problem: Pain (Bowel Resection)  Goal: Acceptable Pain Control  Outcome: Ongoing, Progressing     Problem: Postoperative Nausea and Vomiting (Bowel Resection)  Goal: Nausea and Vomiting Relief  Outcome: Ongoing, Progressing     Problem: Respiratory Compromise (Bowel Resection)  Goal: Effective Oxygenation and Ventilation  Outcome: Ongoing, Progressing     Problem: Pain Acute  Goal: Acceptable Pain Control and Functional Ability  Outcome: Ongoing, Progressing

## 2024-02-06 NOTE — NURSING
Noted hypertension this evening with diastolic greater than 90 with noted hypertension noted throughout hospital stay. At 2136, reached out to Hospital Med provider, LINDA Kelly DNP regarding noted hypertension this evening with provider given relevant patient information and qualifying history. Notified provider of blood pressures obtained this evening and did note that PRN Oxycodone was administered this evening as well for reports of pain by the patient. Reached out to provider whether further orders needed for elevated BP's. Upon contacting provider, provider does note awareness without further orders received.

## 2024-02-07 LAB
ANION GAP SERPL CALC-SCNC: 9 MMOL/L (ref 8–16)
BUN SERPL-MCNC: 4 MG/DL (ref 6–20)
CALCIUM SERPL-MCNC: 8.8 MG/DL (ref 8.7–10.5)
CHLORIDE SERPL-SCNC: 99 MMOL/L (ref 95–110)
CO2 SERPL-SCNC: 31 MMOL/L (ref 23–29)
CREAT SERPL-MCNC: 0.8 MG/DL (ref 0.5–1.4)
CRP SERPL-MCNC: 75.6 MG/L (ref 0–8.2)
ERYTHROCYTE [DISTWIDTH] IN BLOOD BY AUTOMATED COUNT: 13.5 % (ref 11.5–14.5)
EST. GFR  (NO RACE VARIABLE): >60 ML/MIN/1.73 M^2
GLUCOSE SERPL-MCNC: 99 MG/DL (ref 70–110)
HCT VFR BLD AUTO: 30.8 % (ref 40–54)
HGB BLD-MCNC: 10.1 G/DL (ref 14–18)
MCH RBC QN AUTO: 27.4 PG (ref 27–31)
MCHC RBC AUTO-ENTMCNC: 32.8 G/DL (ref 32–36)
MCV RBC AUTO: 84 FL (ref 82–98)
PLATELET # BLD AUTO: 318 K/UL (ref 150–450)
PMV BLD AUTO: 8 FL (ref 9.2–12.9)
POTASSIUM SERPL-SCNC: 4.1 MMOL/L (ref 3.5–5.1)
RBC # BLD AUTO: 3.69 M/UL (ref 4.6–6.2)
SODIUM SERPL-SCNC: 139 MMOL/L (ref 136–145)
WBC # BLD AUTO: 7.94 K/UL (ref 3.9–12.7)

## 2024-02-07 PROCEDURE — 63600175 PHARM REV CODE 636 W HCPCS: Performed by: STUDENT IN AN ORGANIZED HEALTH CARE EDUCATION/TRAINING PROGRAM

## 2024-02-07 PROCEDURE — 85027 COMPLETE CBC AUTOMATED: CPT | Performed by: STUDENT IN AN ORGANIZED HEALTH CARE EDUCATION/TRAINING PROGRAM

## 2024-02-07 PROCEDURE — 94761 N-INVAS EAR/PLS OXIMETRY MLT: CPT

## 2024-02-07 PROCEDURE — 63600175 PHARM REV CODE 636 W HCPCS: Performed by: NURSE PRACTITIONER

## 2024-02-07 PROCEDURE — 11000001 HC ACUTE MED/SURG PRIVATE ROOM

## 2024-02-07 PROCEDURE — 25000003 PHARM REV CODE 250: Performed by: NURSE PRACTITIONER

## 2024-02-07 PROCEDURE — 25000003 PHARM REV CODE 250: Performed by: STUDENT IN AN ORGANIZED HEALTH CARE EDUCATION/TRAINING PROGRAM

## 2024-02-07 PROCEDURE — S4991 NICOTINE PATCH NONLEGEND: HCPCS | Performed by: NURSE PRACTITIONER

## 2024-02-07 PROCEDURE — 94799 UNLISTED PULMONARY SVC/PX: CPT | Mod: XB

## 2024-02-07 PROCEDURE — 80048 BASIC METABOLIC PNL TOTAL CA: CPT | Performed by: STUDENT IN AN ORGANIZED HEALTH CARE EDUCATION/TRAINING PROGRAM

## 2024-02-07 PROCEDURE — 86140 C-REACTIVE PROTEIN: CPT | Performed by: STUDENT IN AN ORGANIZED HEALTH CARE EDUCATION/TRAINING PROGRAM

## 2024-02-07 PROCEDURE — 97116 GAIT TRAINING THERAPY: CPT | Mod: CQ

## 2024-02-07 PROCEDURE — 36415 COLL VENOUS BLD VENIPUNCTURE: CPT | Performed by: STUDENT IN AN ORGANIZED HEALTH CARE EDUCATION/TRAINING PROGRAM

## 2024-02-07 RX ORDER — CHLORPROMAZINE HYDROCHLORIDE 25 MG/ML
25 INJECTION INTRAMUSCULAR ONCE
Status: COMPLETED | OUTPATIENT
Start: 2024-02-07 | End: 2024-02-07

## 2024-02-07 RX ORDER — HYDROXYZINE PAMOATE 25 MG/1
25 CAPSULE ORAL EVERY 12 HOURS
Status: DISCONTINUED | OUTPATIENT
Start: 2024-02-07 | End: 2024-02-08 | Stop reason: HOSPADM

## 2024-02-07 RX ORDER — HYDROMORPHONE HYDROCHLORIDE 1 MG/ML
0.5 INJECTION, SOLUTION INTRAMUSCULAR; INTRAVENOUS; SUBCUTANEOUS EVERY 4 HOURS PRN
Status: DISCONTINUED | OUTPATIENT
Start: 2024-02-07 | End: 2024-02-08 | Stop reason: HOSPADM

## 2024-02-07 RX ADMIN — DIVALPROEX SODIUM 500 MG: 250 TABLET, DELAYED RELEASE ORAL at 03:02

## 2024-02-07 RX ADMIN — OXYCODONE HYDROCHLORIDE 20 MG: 5 TABLET ORAL at 01:02

## 2024-02-07 RX ADMIN — RISPERIDONE 2 MG: 1 TABLET ORAL at 09:02

## 2024-02-07 RX ADMIN — OXYCODONE HYDROCHLORIDE 20 MG: 5 TABLET ORAL at 09:02

## 2024-02-07 RX ADMIN — TRAZODONE HYDROCHLORIDE 50 MG: 50 TABLET ORAL at 09:02

## 2024-02-07 RX ADMIN — DULOXETINE HYDROCHLORIDE 60 MG: 30 CAPSULE, DELAYED RELEASE ORAL at 09:02

## 2024-02-07 RX ADMIN — HYDROXYZINE PAMOATE 25 MG: 25 CAPSULE ORAL at 09:02

## 2024-02-07 RX ADMIN — SODIUM CHLORIDE, POTASSIUM CHLORIDE, SODIUM LACTATE AND CALCIUM CHLORIDE: 600; 310; 30; 20 INJECTION, SOLUTION INTRAVENOUS at 01:02

## 2024-02-07 RX ADMIN — Medication 1 PATCH: at 08:02

## 2024-02-07 RX ADMIN — DIVALPROEX SODIUM 500 MG: 250 TABLET, DELAYED RELEASE ORAL at 09:02

## 2024-02-07 RX ADMIN — PANTOPRAZOLE SODIUM 40 MG: 40 TABLET, DELAYED RELEASE ORAL at 08:02

## 2024-02-07 RX ADMIN — CHLORPROMAZINE HYDROCHLORIDE 25 MG: 25 INJECTION INTRAMUSCULAR at 03:02

## 2024-02-07 RX ADMIN — AMLODIPINE BESYLATE 10 MG: 5 TABLET ORAL at 08:02

## 2024-02-07 RX ADMIN — OXYCODONE HYDROCHLORIDE 20 MG: 5 TABLET ORAL at 04:02

## 2024-02-07 RX ADMIN — MIRTAZAPINE 15 MG: 15 TABLET, FILM COATED ORAL at 09:02

## 2024-02-07 RX ADMIN — DIVALPROEX SODIUM 500 MG: 250 TABLET, DELAYED RELEASE ORAL at 08:02

## 2024-02-07 RX ADMIN — OXYCODONE HYDROCHLORIDE 20 MG: 5 TABLET ORAL at 05:02

## 2024-02-07 RX ADMIN — DULOXETINE HYDROCHLORIDE 60 MG: 30 CAPSULE, DELAYED RELEASE ORAL at 08:02

## 2024-02-07 NOTE — PROGRESS NOTES
Patient seen and examined.  Still with some nausea.  Still passing gas and having some bowel function.  Hiccuping and belching.    Vitals are stable   Afebrile  Abdomen soft, mild distention, stable    Labs reviewed, CRP continues to downtrend slowly     No significant changes from surgical perspective.  Keep on clears today given nausea.  Possible full liquids tomorrow.

## 2024-02-07 NOTE — NURSING
Called to patient bedside by tech d/t patient O2 sats being low. Could not get O2 sats above 85, 2L NC placed on patient and Respiratory Alice notified.

## 2024-02-07 NOTE — SUBJECTIVE & OBJECTIVE
Interval History:  Full liquid diet - pain control     Review of Systems   Gastrointestinal:  Positive for abdominal distention and abdominal pain.   All other systems reviewed and are negative.    Objective:     Vital Signs (Most Recent):  Temp: 98.3 °F (36.8 °C) (02/07/24 1122)  Pulse: 104 (02/07/24 1122)  Resp: 16 (02/07/24 1315)  BP: 130/68 (02/07/24 1122)  SpO2: (!) 93 % (02/07/24 1122) Vital Signs (24h Range):  Temp:  [97.8 °F (36.6 °C)-98.7 °F (37.1 °C)] 98.3 °F (36.8 °C)  Pulse:  [] 104  Resp:  [16-18] 16  SpO2:  [93 %-98 %] 93 %  BP: (120-145)/(59-95) 130/68     Weight: 111.6 kg (246 lb)  Body mass index is 35.3 kg/m².    Intake/Output Summary (Last 24 hours) at 2/7/2024 1431  Last data filed at 2/7/2024 0645  Gross per 24 hour   Intake 2770.51 ml   Output --   Net 2770.51 ml           Physical Exam  Constitutional:       General: He is not in acute distress.     Appearance: He is not ill-appearing, toxic-appearing or diaphoretic.   Cardiovascular:      Rate and Rhythm: Normal rate and regular rhythm.      Pulses: Normal pulses.      Heart sounds: Normal heart sounds.   Pulmonary:      Effort: Pulmonary effort is normal. No respiratory distress.      Breath sounds: Normal breath sounds.   Abdominal:      General: There is distension.      Tenderness: There is abdominal tenderness (appropriately tender). There is no guarding.      Comments: Hypoactive bowel sounds.  Abdominal dressing clean dry and intact.   Skin:     General: Skin is warm and dry.      Coloration: Skin is not pale.   Neurological:      Mental Status: He is alert and oriented to person, place, and time. Mental status is at baseline.             Significant Labs: All pertinent labs within the past 24 hours have been reviewed.  CBC:   Recent Labs   Lab 02/06/24 0443 02/07/24  0436   WBC 8.06 7.94   HGB 10.4* 10.1*   HCT 32.6* 30.8*    318       CMP:   Recent Labs   Lab 02/06/24  0437 02/07/24  0436    139   K 4.3 4.1   CL  98 99   CO2 32* 31*    99   BUN 5* 4*   CREATININE 0.7 0.8   CALCIUM 8.4* 8.8   ANIONGAP 8 9         Significant Imaging: I have reviewed all pertinent imaging results/findings within the past 24 hours.    CT abdomen pelvis pending

## 2024-02-07 NOTE — PT/OT/SLP PROGRESS
Physical Therapy Treatment    Patient Name:  Brayden Francois Jr.   MRN:  [id number]    Recommendations:     Discharge Recommendations: No Therapy Indicated  Discharge Equipment Recommendations:  (TBD (may benefit from walker))  Barriers to discharge: None    Assessment:     Brayden Francois Jr. is a 34 y.o. male admitted with a medical diagnosis of Colostomy in place.  He presents with the following impairments/functional limitations: weakness, impaired endurance, gait instability . Required 2 attempts, not agreeable to OOB 1st attempt. Sup > sit  with S. Socks and gown applied. Sit>  stand with SBA.  Ambulated 250', SBA with IV in tow. Returned to room to chair.    Rehab Prognosis: Good; patient would benefit from acute skilled PT services to address these deficits and reach maximum level of function.    Recent Surgery: Procedure(s) (LRB):  LAPAROTOMY, EXPLORATORY (N/A)  CLOSURE, COLOSTOMY, LAPAROSCOPIC (N/A) 7 Days Post-Op    Plan:     During this hospitalization, patient to be seen 6 x/week to address the identified rehab impairments via gait training, therapeutic activities, therapeutic exercises and progress toward the following goals:    Plan of Care Expires:  02/15/24    Subjective     Chief Complaint: none stated  Patient/Family Comments/goals: to return home with mother  Pain/Comfort:  Pain Rating 1: 0/10      Objective:     Communicated with nurse Heck prior to session.  Patient found supine with peripheral IV upon PT entry to room.     General Precautions: Standard, fall  Orthopedic Precautions: N/A  Braces: N/A  Respiratory Status: Room air     Functional Mobility:  Bed Mobility:     Supine to Sit: supervision  Transfers:     Sit to Stand:  stand by assistance with no AD  Gait: 250' with SBA with IV in tow.       AM-PAC 6 CLICK MOBILITY          Treatment & Education:  Ambulated in [facility] SBA for safety.     Patient left up in chair with all lines intact, call button in reach, nurse Heck notified,  and mother present..    GOALS:   Multidisciplinary Problems       Physical Therapy Goals          Problem: Physical Therapy    Goal Priority Disciplines Outcome Goal Variances Interventions   Physical Therapy Goal     PT, PT/OT Ongoing, Progressing     Description: Goals to be met by: 02/15/2024     Patient will increase functional independence with mobility by performin). Supine to sit with Modified [place]  2). Sit to supine with Modified [place]  3). Sit to stand transfer with Modified [place]  4). Gait  x > 200 feet with Modified [place]                          Time Tracking:     PT Received On: 24  PT Start Time: 1150     PT Stop Time: 1200  PT Total Time (min): 10 min     Billable Minutes: Gait Training 10min    Treatment Type: Treatment  PT/PTA: PTA     Number of PTA visits since last PT visit: 2     2024

## 2024-02-07 NOTE — NURSING
"Upon changed of shift report and going into room to introduce myself as patient night nurse, attempted to turn bed alarm on educated patient and mom on hospital policy regarding bed alarm usage at night. Explained to patient and mom that if patient didn't want the bed alarm set he just needed to sign the bed alarm refusal form. Educated patient and mom of risk that can occur with not using bed alarm and not having a staff member present when patient ambulating, patient and mom verbalized understanding then stated "what, am I supposed to just piss in the bed then." Explained to patient and mom that's what I was stating and educated patient and mom again risk that come with not using the bed alarm and what could occur, patient and mom both verbalized understanding. Offered patient bed alarm refusal form and Patient and mom refusing to sign bed alarm refusal form and stated "If they set it and we will just turn it off ourselves like we have been and if he falls then ya'll are still liable." Steffanie TRAMMELL, charge nurse and House supervisor Melly notified.  "

## 2024-02-07 NOTE — PLAN OF CARE
POC reviewed with pt, verbalized understanding. Patient is alert and oriented x 4, able to make needs known. A-febrile. IV intact and patent with IVF infusing as ordered. Meds given per MAR. Pt on 2L NC. Ambulated to BR independently with out difficulty. Patient refusing bed alarm and refusing to sign bed alarm refusal form, patient and mom educated on risk with refusing bed alarm and refusing to sign refusal form, verbalized complete understanding. Repositions self independently. Complaints of pain managed with PRN medication. IS at bedside, encouraged usage. NAD noted. Purposeful q2hr rounding done. Safety maintained with side rails up x3, bed wheels locked, bed in lowest position, bed alarm set, slip resistant socks maintained, and call light in reach. Patient educated to call for assistance when needed, verbalized understanding. Pt remains free of falls. No further needs expressed at this time. Will continue to monitor.    Problem: Adult Inpatient Plan of Care  Goal: Plan of Care Review  Outcome: Ongoing, Progressing  Goal: Patient-Specific Goal (Individualized)  Outcome: Ongoing, Progressing  Goal: Absence of Hospital-Acquired Illness or Injury  Outcome: Ongoing, Progressing  Goal: Optimal Comfort and Wellbeing  Outcome: Ongoing, Progressing  Goal: Readiness for Transition of Care  Outcome: Ongoing, Progressing     Problem: Infection  Goal: Absence of Infection Signs and Symptoms  Outcome: Ongoing, Progressing     Problem: Fall Injury Risk  Goal: Absence of Fall and Fall-Related Injury  Outcome: Ongoing, Progressing     Problem: Bleeding (Bowel Resection)  Goal: Absence of Bleeding  Outcome: Ongoing, Progressing     Problem: Bowel Motility Impaired (Bowel Resection)  Goal: Effective Bowel Motility and Elimination  Outcome: Ongoing, Progressing     Problem: Fluid and Electrolyte Imbalance (Bowel Resection)  Goal: Fluid and Electrolyte Balance  Outcome: Ongoing, Progressing     Problem: Infection (Bowel  Resection)  Goal: Absence of Infection Signs and Symptoms  Outcome: Ongoing, Progressing     Problem: Malabsorption (Bowel Resection)  Goal: Fluid, Electrolyte and Nutrition Balance  Outcome: Ongoing, Progressing     Problem: Ongoing Anesthesia Effects (Bowel Resection)  Goal: Anesthesia/Sedation Recovery  Outcome: Ongoing, Progressing     Problem: Pain (Bowel Resection)  Goal: Acceptable Pain Control  Outcome: Ongoing, Progressing     Problem: Postoperative Nausea and Vomiting (Bowel Resection)  Goal: Nausea and Vomiting Relief  Outcome: Ongoing, Progressing     Problem: Postoperative Urinary Retention (Bowel Resection)  Goal: Effective Urinary Elimination  Outcome: Ongoing, Progressing     Problem: Respiratory Compromise (Bowel Resection)  Goal: Effective Oxygenation and Ventilation  Outcome: Ongoing, Progressing     Problem: Pain Acute  Goal: Acceptable Pain Control and Functional Ability  Outcome: Ongoing, Progressing

## 2024-02-07 NOTE — NURSING
Rounded on patient. Patient found to have taken his oxygen off. O2 checked, pt sating 95% on RA. Oxygen left off and patient remains on RA.

## 2024-02-07 NOTE — PROGRESS NOTES
[facility] Medicine  Progress Note    Patient Name: Brayden Francois Jr.  MRN: [id number]  Patient Class: IP- Inpatient   Admission Date: 1/31/2024  Length of Stay: 7 days  Attending Physician: oRya Mackenzie MD  Primary Care Provider: Orion Sabillon PA-C        Subjective:     Principal Problem:Colostomy in place        HPI:  Brayden Francois is a 34-year-old male who was seen in PACU.  He is postop day 0 colostomy reversal.  He has a patient Dr. Pascual's.  Patient had colostomy secondary to perforated diverticulum.  Previous medical history includes colostomy status, substance abuse history, psychiatric history.  Preop labs and imaging were reviewed.  Patient be admitted to Hospital Medicine for treatment and management.  Will follow Dr. Pascual's recommendations.    Overview/Hospital Course:  34 y.o. M patient was admitted S/P colostomy reversal 1/31/23.  Postoperatively his pain was difficult to control in his pain regimen was adjusted.  He was initiated on clear liquid diet and advanced to full liquids but had been noted to be eating more than liquids by staff.  He developed an ileus with abdominal distention and nausea.  CT abdomen and pelvis was obtained that revealed rim enhancing areas concerning for abscess vs blood - Dr. Pascual (surgery) suspected hematomas. He remained afebrile with no leukocytosis. Dropped back to clear liquids after unsuccessful advancing of diet. Lytes normal - Bowel sounds positive - less abdominal distension - surgery advanced diet again to full liquids.    Monitor, supportive care, BS+ , surgery following    Interval History:  Full liquid diet - pain control     Review of Systems   Gastrointestinal:  Positive for abdominal distention and abdominal pain.   All other systems reviewed and are negative.    Objective:     Vital Signs (Most Recent):  Temp: 98.3 °F (36.8 °C) (02/07/24 1122)  Pulse: 104 (02/07/24 1122)  Resp: 16 (02/07/24 1315)  BP: 130/68  (02/07/24 1122)  SpO2: (!) 93 % (02/07/24 1122) Vital Signs (24h Range):  Temp:  [97.8 °F (36.6 °C)-98.7 °F (37.1 °C)] 98.3 °F (36.8 °C)  Pulse:  [] 104  Resp:  [16-18] 16  SpO2:  [93 %-98 %] 93 %  BP: (120-145)/(59-95) 130/68     Weight: 111.6 kg (246 lb)  Body mass index is 35.3 kg/m².    Intake/Output Summary (Last 24 hours) at 2/7/2024 1431  Last data filed at 2/7/2024 0645  Gross per 24 hour   Intake 2770.51 ml   Output --   Net 2770.51 ml           Physical Exam  Constitutional:       General: He is not in acute distress.     Appearance: He is not ill-appearing, toxic-appearing or diaphoretic.   Cardiovascular:      Rate and Rhythm: Normal rate and regular rhythm.      Pulses: Normal pulses.      Heart sounds: Normal heart sounds.   Pulmonary:      Effort: Pulmonary effort is normal. No respiratory distress.      Breath sounds: Normal breath sounds.   Abdominal:      General: There is distension.      Tenderness: There is abdominal tenderness (appropriately tender). There is no guarding.      Comments: Hypoactive bowel sounds.  Abdominal dressing clean dry and intact.   Skin:     General: Skin is warm and dry.      Coloration: Skin is not pale.   Neurological:      Mental Status: He is alert and oriented to person, place, and time. Mental status is at baseline.             Significant Labs: All pertinent labs within the past 24 hours have been reviewed.  CBC:   Recent Labs   Lab 02/06/24 0443 02/07/24 0436   WBC 8.06 7.94   HGB 10.4* 10.1*   HCT 32.6* 30.8*    318       CMP:   Recent Labs   Lab 02/06/24  0437 02/07/24 0436    139   K 4.3 4.1   CL 98 99   CO2 32* 31*    99   BUN 5* 4*   CREATININE 0.7 0.8   CALCIUM 8.4* 8.8   ANIONGAP 8 9         Significant Imaging: I have reviewed all pertinent imaging results/findings within the past 24 hours.    CT abdomen pelvis pending    Assessment/Plan:      * Colostomy in place  Status post colostomy reversal  Follow general surgery's  "recommendations   Monitor H/H closely   Aggressive Incentive spirometry  Pain control with p.r.n. narcotics   Antiemetics p.r.n.   Encourage early ambulation   Diet per General surgery recommendations  CT abdomen showed "2 rim enhancing fluid collections, one along the left abdominal wall near the ostomy site and another in the right lower abdominal quadrant mesentery.  Differential includes complex fluid such as hemorrhage and abscess. "  SCDs DVT prophylaxis per General surgery recommendations   --> 116 --> 101 -->82.9    Psychosis  Chronic problem  Continue psychiatric medications   Monitor closely        VTE Risk Mitigation (From admission, onward)           Ordered     Place XUAN hose  Until discontinued         02/01/24 1147                    Discharge Planning   YOLANDA: 2/9/2024     Code Status: Full Code   Is the patient medically ready for discharge?:     Reason for patient still in hospital (select all that apply): Patient trending condition, Treatment, and Consult recommendations  Discharge Plan A: Home   Discharge Delays: None known at this time          Marie Muniz, TRISTA, APRN, FNP-C  [facility] Medicine  [facility]  [email or web address]    "

## 2024-02-07 NOTE — PLAN OF CARE
Problem: Physical Therapy  Goal: Physical Therapy Goal  Description: Goals to be met by: 02/15/2024     Patient will increase functional independence with mobility by performin). Supine to sit with Modified [place]  2). Sit to supine with Modified [place]  3). Sit to stand transfer with Modified [place]  4). Gait  x > 200 feet with Modified [place]     Outcome: Ongoing, Progressing   Ambulate with assistance for safety.

## 2024-02-07 NOTE — ASSESSMENT & PLAN NOTE
"Status post colostomy reversal  Follow general surgery's recommendations   Monitor H/H closely   Aggressive Incentive spirometry  Pain control with p.r.n. narcotics   Antiemetics p.r.n.   Encourage early ambulation   Diet per General surgery recommendations  CT abdomen showed "2 rim enhancing fluid collections, one along the left abdominal wall near the ostomy site and another in the right lower abdominal quadrant mesentery.  Differential includes complex fluid such as hemorrhage and abscess. "  SCDs DVT prophylaxis per General surgery recommendations   --> 116 --> 101 -->82.9  "

## 2024-02-08 VITALS
HEART RATE: 76 BPM | WEIGHT: 246 LBS | TEMPERATURE: 98 F | SYSTOLIC BLOOD PRESSURE: 133 MMHG | BODY MASS INDEX: 35.22 KG/M2 | RESPIRATION RATE: 17 BRPM | DIASTOLIC BLOOD PRESSURE: 80 MMHG | HEIGHT: 70 IN | OXYGEN SATURATION: 94 %

## 2024-02-08 LAB
ANION GAP SERPL CALC-SCNC: 8 MMOL/L (ref 8–16)
BUN SERPL-MCNC: 5 MG/DL (ref 6–20)
CALCIUM SERPL-MCNC: 8.9 MG/DL (ref 8.7–10.5)
CHLORIDE SERPL-SCNC: 102 MMOL/L (ref 95–110)
CO2 SERPL-SCNC: 30 MMOL/L (ref 23–29)
CREAT SERPL-MCNC: 0.8 MG/DL (ref 0.5–1.4)
CRP SERPL-MCNC: 52.2 MG/L (ref 0–8.2)
ERYTHROCYTE [DISTWIDTH] IN BLOOD BY AUTOMATED COUNT: 14.1 % (ref 11.5–14.5)
EST. GFR  (NO RACE VARIABLE): >60 ML/MIN/1.73 M^2
GLUCOSE SERPL-MCNC: 112 MG/DL (ref 70–110)
HCT VFR BLD AUTO: 32 % (ref 40–54)
HGB BLD-MCNC: 10.4 G/DL (ref 14–18)
MCH RBC QN AUTO: 27.2 PG (ref 27–31)
MCHC RBC AUTO-ENTMCNC: 32.5 G/DL (ref 32–36)
MCV RBC AUTO: 84 FL (ref 82–98)
PLATELET # BLD AUTO: 326 K/UL (ref 150–450)
PMV BLD AUTO: 7.8 FL (ref 9.2–12.9)
POTASSIUM SERPL-SCNC: 4.1 MMOL/L (ref 3.5–5.1)
RBC # BLD AUTO: 3.83 M/UL (ref 4.6–6.2)
SODIUM SERPL-SCNC: 140 MMOL/L (ref 136–145)
WBC # BLD AUTO: 7.79 K/UL (ref 3.9–12.7)

## 2024-02-08 PROCEDURE — 80048 BASIC METABOLIC PNL TOTAL CA: CPT | Performed by: STUDENT IN AN ORGANIZED HEALTH CARE EDUCATION/TRAINING PROGRAM

## 2024-02-08 PROCEDURE — 86140 C-REACTIVE PROTEIN: CPT | Performed by: STUDENT IN AN ORGANIZED HEALTH CARE EDUCATION/TRAINING PROGRAM

## 2024-02-08 PROCEDURE — 25000003 PHARM REV CODE 250: Performed by: NURSE PRACTITIONER

## 2024-02-08 PROCEDURE — 63600175 PHARM REV CODE 636 W HCPCS: Performed by: STUDENT IN AN ORGANIZED HEALTH CARE EDUCATION/TRAINING PROGRAM

## 2024-02-08 PROCEDURE — 25000003 PHARM REV CODE 250: Performed by: STUDENT IN AN ORGANIZED HEALTH CARE EDUCATION/TRAINING PROGRAM

## 2024-02-08 PROCEDURE — 63600175 PHARM REV CODE 636 W HCPCS: Performed by: NURSE PRACTITIONER

## 2024-02-08 PROCEDURE — 36415 COLL VENOUS BLD VENIPUNCTURE: CPT | Performed by: STUDENT IN AN ORGANIZED HEALTH CARE EDUCATION/TRAINING PROGRAM

## 2024-02-08 PROCEDURE — 85027 COMPLETE CBC AUTOMATED: CPT | Performed by: STUDENT IN AN ORGANIZED HEALTH CARE EDUCATION/TRAINING PROGRAM

## 2024-02-08 PROCEDURE — 94761 N-INVAS EAR/PLS OXIMETRY MLT: CPT

## 2024-02-08 PROCEDURE — 94799 UNLISTED PULMONARY SVC/PX: CPT | Mod: XB

## 2024-02-08 PROCEDURE — S4991 NICOTINE PATCH NONLEGEND: HCPCS | Performed by: NURSE PRACTITIONER

## 2024-02-08 RX ORDER — IBUPROFEN 200 MG
1 TABLET ORAL DAILY
Qty: 30 PATCH | Refills: 0 | Status: SHIPPED | OUTPATIENT
Start: 2024-02-08 | End: 2024-02-08

## 2024-02-08 RX ORDER — METOCLOPRAMIDE HYDROCHLORIDE 5 MG/ML
10 INJECTION INTRAMUSCULAR; INTRAVENOUS ONCE
Status: COMPLETED | OUTPATIENT
Start: 2024-02-08 | End: 2024-02-08

## 2024-02-08 RX ORDER — PANTOPRAZOLE SODIUM 40 MG/1
40 TABLET, DELAYED RELEASE ORAL DAILY
Qty: 30 TABLET | Refills: 2 | Status: SHIPPED | OUTPATIENT
Start: 2024-02-09 | End: 2024-02-08

## 2024-02-08 RX ORDER — OXYCODONE AND ACETAMINOPHEN 5; 325 MG/1; MG/1
1 TABLET ORAL EVERY 8 HOURS PRN
Qty: 9 TABLET | Refills: 0 | Status: SHIPPED | OUTPATIENT
Start: 2024-02-08 | End: 2024-02-11

## 2024-02-08 RX ORDER — PANTOPRAZOLE SODIUM 40 MG/1
40 TABLET, DELAYED RELEASE ORAL DAILY
Qty: 30 TABLET | Refills: 2 | Status: SHIPPED | OUTPATIENT
Start: 2024-02-09 | End: 2025-02-08

## 2024-02-08 RX ORDER — IBUPROFEN 200 MG
1 TABLET ORAL DAILY
Qty: 30 PATCH | Refills: 0 | Status: SHIPPED | OUTPATIENT
Start: 2024-02-08

## 2024-02-08 RX ORDER — OXYCODONE AND ACETAMINOPHEN 5; 325 MG/1; MG/1
1 TABLET ORAL EVERY 8 HOURS PRN
Qty: 9 TABLET | Refills: 0 | Status: SHIPPED | OUTPATIENT
Start: 2024-02-08 | End: 2024-02-08

## 2024-02-08 RX ADMIN — OXYCODONE HYDROCHLORIDE 20 MG: 5 TABLET ORAL at 01:02

## 2024-02-08 RX ADMIN — OXYCODONE HYDROCHLORIDE 20 MG: 5 TABLET ORAL at 09:02

## 2024-02-08 RX ADMIN — PANTOPRAZOLE SODIUM 40 MG: 40 TABLET, DELAYED RELEASE ORAL at 09:02

## 2024-02-08 RX ADMIN — AMLODIPINE BESYLATE 10 MG: 5 TABLET ORAL at 09:02

## 2024-02-08 RX ADMIN — METOCLOPRAMIDE 10 MG: 5 INJECTION, SOLUTION INTRAMUSCULAR; INTRAVENOUS at 12:02

## 2024-02-08 RX ADMIN — DULOXETINE HYDROCHLORIDE 60 MG: 30 CAPSULE, DELAYED RELEASE ORAL at 09:02

## 2024-02-08 RX ADMIN — DIVALPROEX SODIUM 500 MG: 250 TABLET, DELAYED RELEASE ORAL at 09:02

## 2024-02-08 RX ADMIN — HYDROXYZINE PAMOATE 25 MG: 25 CAPSULE ORAL at 09:02

## 2024-02-08 RX ADMIN — SODIUM CHLORIDE, POTASSIUM CHLORIDE, SODIUM LACTATE AND CALCIUM CHLORIDE: 600; 310; 30; 20 INJECTION, SOLUTION INTRAVENOUS at 12:02

## 2024-02-08 RX ADMIN — Medication 1 PATCH: at 09:02

## 2024-02-08 RX ADMIN — OXYCODONE HYDROCHLORIDE 20 MG: 5 TABLET ORAL at 05:02

## 2024-02-08 NOTE — PLAN OF CARE
Problem: Adult Inpatient Plan of Care  Goal: Plan of Care Review  Outcome: Ongoing, Progressing  Goal: Patient-Specific Goal (Individualized)  Outcome: Ongoing, Progressing  Goal: Optimal Comfort and Wellbeing  Outcome: Ongoing, Progressing     Problem: Infection  Goal: Absence of Infection Signs and Symptoms  Outcome: Ongoing, Progressing     Problem: Fall Injury Risk  Goal: Absence of Fall and Fall-Related Injury  Outcome: Ongoing, Progressing     Problem: Bleeding (Bowel Resection)  Goal: Absence of Bleeding  Outcome: Ongoing, Progressing     Problem: Infection (Bowel Resection)  Goal: Absence of Infection Signs and Symptoms  Outcome: Ongoing, Progressing     Problem: Pain (Bowel Resection)  Goal: Acceptable Pain Control  Outcome: Ongoing, Progressing     Problem: Postoperative Nausea and Vomiting (Bowel Resection)  Goal: Nausea and Vomiting Relief  Outcome: Ongoing, Progressing     Problem: Postoperative Urinary Retention (Bowel Resection)  Goal: Effective Urinary Elimination  Outcome: Ongoing, Progressing     Problem: Respiratory Compromise (Bowel Resection)  Goal: Effective Oxygenation and Ventilation  Outcome: Ongoing, Progressing     Problem: Pain Acute  Goal: Acceptable Pain Control and Functional Ability  Outcome: Ongoing, Progressing

## 2024-02-08 NOTE — DISCHARGE SUMMARY
[facility] Medicine  Discharge Summary      Patient Name: Brayden Francois Jr.  MRN: [id number]  HAROLDO: [id number]  Patient Class: IP- Inpatient  Admission Date: 1/31/2024  Hospital Length of Stay: 8 days  Discharge Date and Time:  02/08/2024 1:18 PM  Attending Physician: Roya Mackenzie MD   Discharging Provider: CLAUDIA Welch  Primary Care Provider: Orion Sabillon PA-C    Primary Care Team: Networked reference to record PCT     HPI:   Brayden Francois is a 34-year-old male who was seen in PACU.  He is postop day 0 colostomy reversal.  He has a patient Dr. Pascual's.  Patient had colostomy secondary to perforated diverticulum.  Previous medical history includes colostomy status, substance abuse history, psychiatric history.  Preop labs and imaging were reviewed.  Patient be admitted to Hospital Medicine for treatment and management.  Will follow Dr. Pascual's recommendations.    Procedure(s) (LRB):  LAPAROTOMY, EXPLORATORY (N/A)  CLOSURE, COLOSTOMY, LAPAROSCOPIC (N/A)      Hospital Course:   34 y.o. M patient was admitted S/P colostomy reversal 1/31/23.  Postoperatively his pain was difficult to control in his pain regimen was adjusted.  He was initiated on clear liquid diet and advanced to full liquids but had been noted to be eating more than liquids by staff.  He developed an ileus with abdominal distention and nausea.  CT abdomen and pelvis was obtained that revealed rim enhancing areas concerning for abscess vs blood - Dr. Pascual (surgery) suspected hematomas. He remained afebrile with no leukocytosis. Dropped back to clear liquids after unsuccessful advancing of diet. Lytes normal - Bowel sounds positive - less abdominal distension - surgery advanced diet again to full liquids and subsequently to regular diet which he has been able to tolerate without emesis or nausea.  Bowel sounds positive all 4 quads, he is in good spirits, and can safely discharge to home.  Afebrile  without leukocytosis, and vitals normal.  Follow up with surgery in clinic     Goals of Care Treatment Preferences:  Code Status: Full Code      Consults:   Consults (From admission, onward)          Status Ordering Provider     Inpatient consult to Hospitalist  Once        Provider:  Brayden Crow, NP    Acknowledged LINNEA RAMOS            No new Assessment & Plan notes have been filed under this hospital service since the last note was generated.  Service: Hospital Medicine    Final Active Diagnoses:    Diagnosis Date Noted POA    PRINCIPAL PROBLEM:  Colostomy in place [Z93.3] 01/31/2024 Not Applicable    Psychosis [F29] 04/22/2023 Yes     Chronic      Problems Resolved During this Admission:       Discharged Condition: stable    Disposition: Home or Self Care    Follow Up:   Follow-up Information       Orion Sabillon PA-C Follow up on 2/28/2024.    Specialty: Family Medicine  Why: 9:30 am hospital follow up .  Contact information:  [address] [address]  [phone]                           Patient Instructions:      Ambulatory referral/consult to Family Practice   Standing Status: Future   Referral Priority: Routine Referral Type: Consultation   Referral Reason: Specialty Services Required   Referred to Provider: LEORA VILLARREAL Requested Specialty: Family Medicine   Number of Visits Requested: 1     Ambulatory referral/consult to Smoking Cessation Program   Standing Status: Future   Referral Priority: Routine Referral Type: Consultation   Referral Reason: Specialty Services Required   Requested Specialty: CTTS   Number of Visits Requested: 1     Diet Adult Regular     Diet Adult Regular     Activity as tolerated       Significant Diagnostic Studies: Labs: CMP   Recent Labs   Lab 02/07/24  0436 02/08/24  0612    140   K 4.1 4.1   CL 99 102   CO2 31* 30*   GLU 99 112*   BUN 4* 5*   CREATININE 0.8 0.8   CALCIUM 8.8 8.9   ANIONGAP 9 8    and CBC   Recent Labs   Lab  02/07/24  0436 02/08/24  0616   WBC 7.94 7.79   HGB 10.1* 10.4*   HCT 30.8* 32.0*    326       Pending Diagnostic Studies:       None           Medications:  Reconciled Home Medications:      Medication List        START taking these medications      nicotine 21 mg/24 hr  Commonly known as: NICODERM CQ  Place 1 patch onto the skin once daily.     oxyCODONE-acetaminophen 5-325 mg per tablet  Commonly known as: PERCOCET  Take 1 tablet by mouth every 8 (eight) hours as needed for Pain.     pantoprazole 40 MG tablet  Commonly known as: PROTONIX  Take 1 tablet (40 mg total) by mouth once daily.  Start taking on: February 9, 2024  Replaces: omeprazole 20 MG capsule            CONTINUE taking these medications      acetaminophen 325 MG tablet  Commonly known as: TYLENOL  Take 325 mg by mouth.     amLODIPine 10 MG tablet  Commonly known as: NORVASC  Take 10 mg by mouth once daily.     divalproex 500 MG Tbec  Commonly known as: DEPAKOTE  Take 500 mg by mouth 3 (three) times daily.     DULoxetine 60 MG capsule  Commonly known as: CYMBALTA  Take 1 capsule (60 mg total) by mouth 2 (two) times daily.     hydrOXYzine 100 MG capsule  Commonly known as: VISTARIL  Take 100 mg by mouth once.     mirtazapine 15 MG tablet  Commonly known as: REMERON  Take 1 tablet (15 mg total) by mouth every evening.     risperiDONE 2 MG tablet  Commonly known as: RISPERDAL  Take 1 tablet (2 mg total) by mouth every evening.     traZODone 50 MG tablet  Commonly known as: DESYREL  Take 1 tablet (50 mg total) by mouth nightly as needed for Insomnia.            STOP taking these medications      meloxicam 7.5 MG tablet  Commonly known as: MOBIC     omeprazole 20 MG capsule  Commonly known as: PRILOSEC  Replaced by: pantoprazole 40 MG tablet     paliperidone 3 MG Tr24  Commonly known as: INVEGA              Indwelling Lines/Drains at time of discharge:   Lines/Drains/Airways       Drain  Duration                  Colostomy 09/20/23 0153  Descending/sigmoid  days                    Time spent on the discharge of patient: 45 minutes               Marie Muniz DNP, APRN, FNP-C  [facility]  [facility]  [email or web address]

## 2024-02-08 NOTE — NURSING
Report given to Vinay MARINELLI RN and care assumed by Vinay. Patient and mom updated on nurse change.

## 2024-02-08 NOTE — CARE UPDATE
02/07/24 1954   Patient Assessment/Suction   Level of Consciousness (AVPU) alert   Respiratory Effort Normal;Unlabored   Expansion/Accessory Muscles/Retractions expansion symmetric;no retractions;no use of accessory muscles   All Lung Fields Breath Sounds clear   PRE-TX-O2   Device (Oxygen Therapy) room air   SpO2 97 %   Pulse Oximetry Type Intermittent   Pulse 105   Resp 18   Incentive Spirometer   $ Incentive Spirometer Charges done with encouragement   Administration (IS) proper technique demonstrated   Number of Repetitions (IS) 5   Level Incentive Spirometer (mL) 3000   Patient Tolerance (IS) good

## 2024-02-08 NOTE — NURSING
Patient complaining of hiccups and requesting something for them. Also request oxygen d/t hiccuping. 2L O2 NC placed on patient for comfort. Patience DIA NP notified and orders given   How Severe Is This Condition?: mild

## 2024-02-08 NOTE — DISCHARGE INSTRUCTIONS
Eat balanced meals stay hydrated Drink plenty of water no lifting over 10 lbs No heavy lifting pulling or pushing until cleared by Dr Pascual if you develop redness swelling or drainage at incision go to nearest emergency room ASAP if you start running fever unrelieved go to nearest emergency room no tub bathing or hot tubs or swimming, follow up with scheduled appointments. If you become constipated go to nearest emergency room if you have increased nausea and vomiting go to nearest emergency room.

## 2024-02-08 NOTE — NURSING
"Report received from Natacha CURRY LPN and care of patient is assumed for the remainder of the shift at this time. Patient awake and alert in bed and is free from vocalizing any major concerns, other than reporting "hiccups."   "

## 2024-02-08 NOTE — PLAN OF CARE
"Plan of care review with pt, pt states "understanding," IVF infusing without difficulty, no redness/swelling noted to IV site, midline and left lateral incision has staples, dressing is dry/intact, tolerated dressing change, expelling gas, pain is controlled with current regimen, ambulating ad dwight, will continue to monitor, observe and note any changes, safety maintain    "

## 2024-02-08 NOTE — NURSING
Upon changed of shift report, attempted to turn bed alarm on and educated patient and mom on hospital policy regarding bed alarm usage at night and risk that can occur with not using bed alarm, patient and mom verbalized complete understanding. Explained to patient and mom that if patient didn't want the bed alarm set he just needed to sign the bed alarm refusal form. Offered patient bed alarm refusal form, patient and mom refusing to sign bed alarm refusal form, Nguyen charge nurse and House supervisor  notified.

## 2024-02-08 NOTE — PLAN OF CARE
Patient cleared for discharge from case management standpoint.    Post op follow up placed on AVS.       02/08/24 1334   Final Note   Assessment Type Final Discharge Note   Anticipated Discharge Disposition Home   What phone number can be called within the next 1-3 days to see how you are doing after discharge? [phone]   Hospital Resources/Appts/Education Provided Provided patient/caregiver with written discharge plan information;Provided education on problems/symptoms using teachback;Appointments scheduled and added to AVS   Post-Acute Status   Post-Acute Authorization Other   Other Status No Post-Acute Service Needs   Discharge Delays None known at this time

## 2024-02-16 ENCOUNTER — TELEPHONE (OUTPATIENT)
Dept: SURGERY | Facility: CLINIC | Age: 34
End: 2024-02-16
Payer: MEDICAID

## 2024-02-22 ENCOUNTER — OFFICE VISIT (OUTPATIENT)
Dept: SURGERY | Facility: CLINIC | Age: 34
End: 2024-02-22
Payer: MEDICAID

## 2024-02-22 VITALS — TEMPERATURE: 98 F

## 2024-02-22 DIAGNOSIS — Z98.890 POST-OPERATIVE STATE: Primary | ICD-10-CM

## 2024-02-22 PROCEDURE — 99024 POSTOP FOLLOW-UP VISIT: CPT | Mod: ,,, | Performed by: STUDENT IN AN ORGANIZED HEALTH CARE EDUCATION/TRAINING PROGRAM

## 2024-02-22 PROCEDURE — 1159F MED LIST DOCD IN RCRD: CPT | Mod: CPTII,,, | Performed by: STUDENT IN AN ORGANIZED HEALTH CARE EDUCATION/TRAINING PROGRAM

## 2024-02-22 PROCEDURE — 99999 PR PBB SHADOW E&M-EST. PATIENT-LVL II: CPT | Mod: PBBFAC,,, | Performed by: STUDENT IN AN ORGANIZED HEALTH CARE EDUCATION/TRAINING PROGRAM

## 2024-02-22 PROCEDURE — 99212 OFFICE O/P EST SF 10 MIN: CPT | Mod: PBBFAC,PN | Performed by: STUDENT IN AN ORGANIZED HEALTH CARE EDUCATION/TRAINING PROGRAM

## 2024-02-22 RX ORDER — BENZTROPINE MESYLATE 2 MG/1
2 TABLET ORAL EVERY MORNING
COMMUNITY
Start: 2024-02-19

## 2024-02-22 RX ORDER — OMEPRAZOLE 20 MG/1
20 CAPSULE, DELAYED RELEASE ORAL
COMMUNITY
Start: 2024-02-21

## 2024-02-22 NOTE — PROGRESS NOTES
Postop note     Patient underwent colostomy takedown and colorectal anastomosis.  Doing well.      Incisions healing well    Pathology:  Benign    Overall doing well.  Staples were removed.  Follow up as needed.

## 2025-06-10 ENCOUNTER — PATIENT MESSAGE (OUTPATIENT)
Dept: SURGERY | Facility: CLINIC | Age: 35
End: 2025-06-10
Payer: MEDICAID

## (undated) DEVICE — STAPLER ECHELON PWR CIR 31MM

## (undated) DEVICE — TOWEL OR DISP STRL BLUE 4/PK

## (undated) DEVICE — APPLICATOR CHLORAPREP ORN 26ML

## (undated) DEVICE — PACK CUSTOM UNIV BASIN SLI

## (undated) DEVICE — YANKAUER OPEN TIP W/O VENT

## (undated) DEVICE — STAPLER INT PROX TX 60X3.5MM

## (undated) DEVICE — SUT CTD VICRYL 3-0 CR/SH

## (undated) DEVICE — GOWN POLY REINF BRTH SLV XL

## (undated) DEVICE — SUT CTD VICRYL BR CR/SH VIL

## (undated) DEVICE — SOL IRRI STRL WATER 1000ML

## (undated) DEVICE — DRESSING ABSRBNT ISLAND 3.6X8

## (undated) DEVICE — SYR LUER LOCK STERILE 10ML

## (undated) DEVICE — Device

## (undated) DEVICE — CUTTER PROXIMATE BLUE 75MM

## (undated) DEVICE — ELECTRODE BLADE W/SLEEVE 2.75

## (undated) DEVICE — POUCH OUTLETSENSURA MIO WIDE

## (undated) DEVICE — SUT 3-0 12-18IN SILK

## (undated) DEVICE — STAPLER INTERNL CONTOR CV BLU

## (undated) DEVICE — NDL SAFETY 21G X 1 1/2 ECLPSE

## (undated) DEVICE — SUT SILK 3-0 SH 18IN BLACK

## (undated) DEVICE — SHEARS HARMONIC 5CM 36CM

## (undated) DEVICE — GLOVE SENSICARE PI ALOE 7

## (undated) DEVICE — BLADE ELECTRO EDGE INSULATED

## (undated) DEVICE — GLOVE SENSICARE PI ALOE 7.5

## (undated) DEVICE — GLOVE SENSICARE PI GRN 7

## (undated) DEVICE — ELECTRODE REM PLYHSV RETURN 9

## (undated) DEVICE — SUT PDS II 0 CT VIL MONO 36

## (undated) DEVICE — SUT 2-0 12-18IN SILK

## (undated) DEVICE — SUT 1 48IN PDS II VIO MONO

## (undated) DEVICE — SOL NACL IRR 1000ML BTL

## (undated) DEVICE — SPONGE LAP 18X18 PREWASHED

## (undated) DEVICE — KIT COLLECTION E SWAB REGULAR

## (undated) DEVICE — SUT 2-0 VICRYL / SH (J417)

## (undated) DEVICE — STAPLER SKIN ROTATING HEAD

## (undated) DEVICE — SLEEVE SCD EXPRESS KNEE MEDIUM

## (undated) DEVICE — GAUZE SPONGE BULKEE 6X6.75IN

## (undated) DEVICE — SUT PROLENE 2-0 SH 36IN BLU

## (undated) DEVICE — GLOVE SENSICARE PI ALOE 6

## (undated) DEVICE — DRAPE ABDOMINAL TIBURON 14X11

## (undated) DEVICE — SUT 0 VICRYL / CT-1

## (undated) DEVICE — GLOVE SENSICARE PI GRN 8

## (undated) DEVICE — GLOVE SENSICARE PI ALOE 6.5

## (undated) DEVICE — SYR 10CC LUER LOCK

## (undated) DEVICE — SUT SILK 0 STRANDS 30IN BLK

## (undated) DEVICE — GLOVE SENSICARE PI GRN 6.5

## (undated) DEVICE — LEGGING CLEAR POLY 2/PACK

## (undated) DEVICE — PACK SIRUS BASIC V SURG STRL

## (undated) DEVICE — DRAPE UINDERBUT GRAD PCH

## (undated) DEVICE — RELOAD PROXIMATE CUT BLUE 75MM

## (undated) DEVICE — ELECTRODE BLADE TEFLON 6

## (undated) DEVICE — TRAY CATH FOL SIL URIMTR 16FR

## (undated) DEVICE — SEALER LIGASURE IMPACT 18CM

## (undated) DEVICE — SUT 2/0 30IN SILK BLK BRAI

## (undated) DEVICE — CART STAPLE RELD PROXTX 60X3.5